# Patient Record
Sex: FEMALE | Race: WHITE | Employment: UNEMPLOYED | URBAN - METROPOLITAN AREA
[De-identification: names, ages, dates, MRNs, and addresses within clinical notes are randomized per-mention and may not be internally consistent; named-entity substitution may affect disease eponyms.]

---

## 2018-01-17 ENCOUNTER — OFFICE VISIT (OUTPATIENT)
Dept: OBGYN CLINIC | Facility: CLINIC | Age: 28
End: 2018-01-17

## 2018-01-17 VITALS — WEIGHT: 228 LBS | SYSTOLIC BLOOD PRESSURE: 131 MMHG | DIASTOLIC BLOOD PRESSURE: 86 MMHG | HEART RATE: 81 BPM

## 2018-01-17 DIAGNOSIS — Z12.4 SCREENING FOR MALIGNANT NEOPLASM OF CERVIX: ICD-10-CM

## 2018-01-17 DIAGNOSIS — Z01.419 ENCOUNTER FOR GYNECOLOGICAL EXAMINATION WITHOUT ABNORMAL FINDING: Primary | ICD-10-CM

## 2018-01-17 PROCEDURE — 99385 PREV VISIT NEW AGE 18-39: CPT | Performed by: OBSTETRICS & GYNECOLOGY

## 2018-01-17 RX ORDER — NORETHINDRONE ACETATE AND ETHINYL ESTRADIOL AND FERROUS FUMARATE 1MG-20(21)
KIT ORAL
Qty: 1 PACKAGE | Refills: 12 | Status: SHIPPED | OUTPATIENT
Start: 2018-01-17 | End: 2018-09-12

## 2018-01-17 RX ORDER — NORETHINDRONE ACETATE AND ETHINYL ESTRADIOL AND FERROUS FUMARATE 1MG-20(21)
KIT ORAL
Refills: 6 | COMMUNITY
Start: 2017-12-31 | End: 2018-01-17

## 2018-01-18 NOTE — PROGRESS NOTES
HPI:    Patient ID: Giles Clemente is a 32year old female. Referred by Dr Ferdinand Alegria family members. Dr Ferdinand Alegria is her father-in-law. HPI   with reg menses on OCP for BC. She is 6 year relationship.  She delivered [place] at [facility] 6 months ago with There is no rebound and no guarding. Genitourinary: Vagina normal and uterus normal. No breast discharge. There is no rash or lesion on the right labia. There is no rash or lesion on the left labia. Uterus is not enlarged and not tender.  Cervix exhibits

## 2018-02-22 ENCOUNTER — TELEPHONE (OUTPATIENT)
Dept: OBGYN CLINIC | Facility: CLINIC | Age: 28
End: 2018-02-22

## 2018-02-23 ENCOUNTER — OFFICE VISIT (OUTPATIENT)
Dept: OBGYN CLINIC | Facility: CLINIC | Age: 28
End: 2018-02-23

## 2018-02-23 VITALS — DIASTOLIC BLOOD PRESSURE: 83 MMHG | HEART RATE: 80 BPM | WEIGHT: 230 LBS | SYSTOLIC BLOOD PRESSURE: 135 MMHG

## 2018-02-23 DIAGNOSIS — N64.4 MASTODYNIA: Primary | ICD-10-CM

## 2018-02-23 PROCEDURE — 99213 OFFICE O/P EST LOW 20 MIN: CPT | Performed by: OBSTETRICS & GYNECOLOGY

## 2018-04-16 NOTE — PROGRESS NOTES
HPI:    Patient ID: Adam Burris is a 32year old female. HPI   with reg menses on OCP for BC. Here today with c/o lump on right breast first noted in past week and some diffuse tenderness. No Nipple change or DC.  No drainage from area on superf

## 2018-05-07 ENCOUNTER — PATIENT MESSAGE (OUTPATIENT)
Dept: OBGYN CLINIC | Facility: CLINIC | Age: 28
End: 2018-05-07

## 2018-05-07 DIAGNOSIS — N63.10 LUMP OF RIGHT BREAST: Primary | ICD-10-CM

## 2018-05-07 NOTE — TELEPHONE ENCOUNTER
Exam showed probable inclusion cyst on right. OK for US right  And mammo to follow if necessary. Order was sent.

## 2018-05-07 NOTE — TELEPHONE ENCOUNTER
From: Matthew Davila  To: Grace Hartman DO  Sent: 5/7/2018 12:24 PM CDT  Subject: Visit Radames Staley Patient told me to let you know if the bump in my boob has gone away after my second cycle.  I’m just beginning my third cycle fro

## 2018-05-18 ENCOUNTER — HOSPITAL ENCOUNTER (OUTPATIENT)
Dept: ULTRASOUND IMAGING | Facility: HOSPITAL | Age: 28
Discharge: HOME | End: 2018-05-18
Attending: OBSTETRICS & GYNECOLOGY
Payer: COMMERCIAL

## 2018-05-18 DIAGNOSIS — N63.10 LUMP OF RIGHT BREAST: ICD-10-CM

## 2018-05-18 PROCEDURE — 76642 ULTRASOUND BREAST LIMITED: CPT | Performed by: OBSTETRICS & GYNECOLOGY

## 2018-06-20 ENCOUNTER — OFFICE VISIT (OUTPATIENT)
Dept: INTERNAL MEDICINE CLINIC | Facility: CLINIC | Age: 28
End: 2018-06-20

## 2018-06-20 VITALS
SYSTOLIC BLOOD PRESSURE: 133 MMHG | DIASTOLIC BLOOD PRESSURE: 82 MMHG | BODY MASS INDEX: 42.11 KG/M2 | HEIGHT: 63 IN | TEMPERATURE: 99 F | HEART RATE: 87 BPM | WEIGHT: 237.69 LBS

## 2018-06-20 DIAGNOSIS — Z00.00 ANNUAL PHYSICAL EXAM: Primary | ICD-10-CM

## 2018-06-20 PROCEDURE — 99385 PREV VISIT NEW AGE 18-39: CPT | Performed by: INTERNAL MEDICINE

## 2018-06-20 NOTE — PROGRESS NOTES
Heriberto Hernandez is a 32year old female. Patient presents with:  Physical      HPI:     HPI   Patient is a 31 yo f here for annual physical exam. Her previous is Dr. Nati Rodriguez in [facility].  She is overall doing well. She is 11 months postpartum.   Claudine Guadalupe Negative for back pain and myalgias. Skin: Negative for rash. Neurological: Negative for dizziness and headaches. Endo/Heme/Allergies: Negative for environmental allergies. Does not bruise/bleed easily.    Psychiatric/Behavioral: Negative for depressi Psychiatric: She has a normal mood and affect.          ASSESSMENT AND PLAN:       Diagnoses and all orders for this visit:    Annual physical exam  Discussed about possible etiology for abdominal pain including irritable bowel disease, dyspepsia or infla

## 2018-06-29 ENCOUNTER — LAB ENCOUNTER (OUTPATIENT)
Dept: LAB | Facility: HOSPITAL | Age: 28
End: 2018-06-29
Attending: INTERNAL MEDICINE
Payer: COMMERCIAL

## 2018-06-29 DIAGNOSIS — Z00.00 ANNUAL PHYSICAL EXAM: ICD-10-CM

## 2018-06-29 LAB
ALBUMIN SERPL BCP-MCNC: 4.1 G/DL (ref 3.5–4.8)
ALBUMIN/GLOB SERPL: 1.3 {RATIO} (ref 1–2)
ALP SERPL-CCNC: 50 U/L (ref 32–100)
ALT SERPL-CCNC: 22 U/L (ref 14–54)
ANION GAP SERPL CALC-SCNC: 10 MMOL/L (ref 0–18)
AST SERPL-CCNC: 22 U/L (ref 15–41)
BASOPHILS # BLD: 0 K/UL (ref 0–0.2)
BASOPHILS NFR BLD: 1 %
BILIRUB SERPL-MCNC: 0.4 MG/DL (ref 0.3–1.2)
BUN SERPL-MCNC: 11 MG/DL (ref 8–20)
BUN/CREAT SERPL: 14.9 (ref 10–20)
CALCIUM SERPL-MCNC: 9.7 MG/DL (ref 8.5–10.5)
CHLORIDE SERPL-SCNC: 103 MMOL/L (ref 95–110)
CHOLEST SERPL-MCNC: 197 MG/DL (ref 110–200)
CO2 SERPL-SCNC: 23 MMOL/L (ref 22–32)
CREAT SERPL-MCNC: 0.74 MG/DL (ref 0.5–1.5)
EOSINOPHIL # BLD: 0.1 K/UL (ref 0–0.7)
EOSINOPHIL NFR BLD: 1 %
ERYTHROCYTE [DISTWIDTH] IN BLOOD BY AUTOMATED COUNT: 14.2 % (ref 11–15)
GLOBULIN PLAS-MCNC: 3.1 G/DL (ref 2.5–3.7)
GLUCOSE SERPL-MCNC: 110 MG/DL (ref 70–99)
HBA1C MFR BLD: 6 % (ref 4–6)
HCT VFR BLD AUTO: 40.3 % (ref 35–48)
HDLC SERPL-MCNC: 49 MG/DL
HGB BLD-MCNC: 13.5 G/DL (ref 12–16)
LDLC SERPL CALC-MCNC: 121 MG/DL (ref 0–99)
LYMPHOCYTES # BLD: 1.8 K/UL (ref 1–4)
LYMPHOCYTES NFR BLD: 36 %
MCH RBC QN AUTO: 28.8 PG (ref 27–32)
MCHC RBC AUTO-ENTMCNC: 33.5 G/DL (ref 32–37)
MCV RBC AUTO: 85.9 FL (ref 80–100)
MONOCYTES # BLD: 0.5 K/UL (ref 0–1)
MONOCYTES NFR BLD: 9 %
NEUTROPHILS # BLD AUTO: 2.7 K/UL (ref 1.8–7.7)
NEUTROPHILS NFR BLD: 53 %
NONHDLC SERPL-MCNC: 148 MG/DL
OSMOLALITY UR CALC.SUM OF ELEC: 282 MOSM/KG (ref 275–295)
PATIENT FASTING: YES
PLATELET # BLD AUTO: 190 K/UL (ref 140–400)
PMV BLD AUTO: 9.5 FL (ref 7.4–10.3)
POTASSIUM SERPL-SCNC: 4.6 MMOL/L (ref 3.3–5.1)
PROT SERPL-MCNC: 7.2 G/DL (ref 5.9–8.4)
RBC # BLD AUTO: 4.69 M/UL (ref 3.7–5.4)
SODIUM SERPL-SCNC: 136 MMOL/L (ref 136–144)
TRIGL SERPL-MCNC: 134 MG/DL (ref 1–149)
TSH SERPL-ACNC: 1.16 UIU/ML (ref 0.45–5.33)
WBC # BLD AUTO: 5 K/UL (ref 4–11)

## 2018-06-29 PROCEDURE — 80053 COMPREHEN METABOLIC PANEL: CPT

## 2018-06-29 PROCEDURE — 85025 COMPLETE CBC W/AUTO DIFF WBC: CPT

## 2018-06-29 PROCEDURE — 84443 ASSAY THYROID STIM HORMONE: CPT

## 2018-06-29 PROCEDURE — 83036 HEMOGLOBIN GLYCOSYLATED A1C: CPT

## 2018-06-29 PROCEDURE — 36415 COLL VENOUS BLD VENIPUNCTURE: CPT

## 2018-06-29 PROCEDURE — 80061 LIPID PANEL: CPT

## 2018-09-11 ENCOUNTER — PATIENT MESSAGE (OUTPATIENT)
Dept: OBGYN CLINIC | Facility: CLINIC | Age: 28
End: 2018-09-11

## 2019-02-13 ENCOUNTER — TELEPHONE (OUTPATIENT)
Dept: OBGYN CLINIC | Facility: CLINIC | Age: 29
End: 2019-02-13

## 2019-02-13 NOTE — TELEPHONE ENCOUNTER
Received a requst for Low Ogestrel birth control pills. Pt had her last Annual with ALFONZO 1-17-18. Pap 1-2018, ALFONZO stated on 1-19-18 neg pap and pap in 3 years. Informed pt that she needs to schedule an Annual, so rx for ocps can be sent.   Pt transf

## 2019-04-11 ENCOUNTER — APPOINTMENT (OUTPATIENT)
Dept: LAB | Age: 29
End: 2019-04-11
Attending: OBSTETRICS & GYNECOLOGY
Payer: COMMERCIAL

## 2019-04-11 ENCOUNTER — OFFICE VISIT (OUTPATIENT)
Dept: OBGYN CLINIC | Facility: CLINIC | Age: 29
End: 2019-04-11

## 2019-04-11 VITALS
HEART RATE: 78 BPM | SYSTOLIC BLOOD PRESSURE: 128 MMHG | WEIGHT: 233 LBS | BODY MASS INDEX: 41 KG/M2 | DIASTOLIC BLOOD PRESSURE: 79 MMHG

## 2019-04-11 DIAGNOSIS — Z01.419 ENCOUNTER FOR GYNECOLOGICAL EXAMINATION WITHOUT ABNORMAL FINDING: Primary | ICD-10-CM

## 2019-04-11 DIAGNOSIS — Z31.69 ENCOUNTER FOR PRECONCEPTION CONSULTATION: ICD-10-CM

## 2019-04-11 PROCEDURE — 99395 PREV VISIT EST AGE 18-39: CPT | Performed by: OBSTETRICS & GYNECOLOGY

## 2019-04-11 PROCEDURE — 86762 RUBELLA ANTIBODY: CPT

## 2019-04-11 PROCEDURE — 36415 COLL VENOUS BLD VENIPUNCTURE: CPT

## 2019-04-11 NOTE — PROGRESS NOTES
HPI:    Patient ID: April Gauthier is a 25year old female. HPI   with regular menses on OCP for BC. They are planning on stopping OC by about  or July and then trying for conception. SHe had screen labs in  with normal CBC and TSH.      Re discharge. There is no rash or lesion on the right labia. There is no rash or lesion on the left labia. Uterus is not enlarged and not tender. Cervix exhibits no motion tenderness and no discharge.  Right adnexum displays no mass, no tenderness and no fulln

## 2019-05-06 ENCOUNTER — TELEPHONE (OUTPATIENT)
Dept: OBGYN CLINIC | Facility: CLINIC | Age: 29
End: 2019-05-06

## 2019-05-06 NOTE — TELEPHONE ENCOUNTER
Received refill request for OCP from [facility]. Per ALFONZO notes 4/11/19 Tuan Massey are planning on stopping OC by about June or July and then trying for conception\". Spoke with pt and pt requesting 1 month OCP refill.  Informed pt 1 month OCP will be sent to [facility]

## 2020-02-27 ENCOUNTER — PATIENT MESSAGE (OUTPATIENT)
Dept: OBGYN CLINIC | Facility: CLINIC | Age: 30
End: 2020-02-27

## 2020-02-27 NOTE — TELEPHONE ENCOUNTER
From: Arsalan [address]  To: Bulmaro Dunham. DO Víctor  Sent: 2/27/2020 1:20 PM CST  Subject: Other    Hello,    Do you have any appointments at [facility] in mid April?

## 2020-06-15 ENCOUNTER — OFFICE VISIT (OUTPATIENT)
Dept: OBGYN CLINIC | Facility: CLINIC | Age: 30
End: 2020-06-15

## 2020-06-15 VITALS
DIASTOLIC BLOOD PRESSURE: 86 MMHG | WEIGHT: 244 LBS | HEART RATE: 93 BPM | SYSTOLIC BLOOD PRESSURE: 143 MMHG | BODY MASS INDEX: 43 KG/M2

## 2020-06-15 DIAGNOSIS — Z11.3 SCREENING EXAMINATION FOR VENEREAL DISEASE: ICD-10-CM

## 2020-06-15 DIAGNOSIS — Z01.419 ENCOUNTER FOR GYNECOLOGICAL EXAMINATION WITHOUT ABNORMAL FINDING: Primary | ICD-10-CM

## 2020-06-15 DIAGNOSIS — N97.9 SECONDARY FEMALE INFERTILITY: ICD-10-CM

## 2020-06-15 DIAGNOSIS — Z12.4 SCREENING FOR MALIGNANT NEOPLASM OF CERVIX: ICD-10-CM

## 2020-06-15 PROCEDURE — 99212 OFFICE O/P EST SF 10 MIN: CPT | Performed by: OBSTETRICS & GYNECOLOGY

## 2020-06-15 PROCEDURE — 99395 PREV VISIT EST AGE 18-39: CPT | Performed by: OBSTETRICS & GYNECOLOGY

## 2020-06-22 NOTE — PROGRESS NOTES
HPI:    Patient ID: Leatha Roberts is a 30year old female. HPI   with menses q 25-36 d / 4d / camps and heavy flow x 3d. She stopped OCP in May 2019. Regular IC with her  since and no conception. Reyna Dallas will be 1 next month.   She conceive adnexum displays no mass, no tenderness and no fullness. No vaginal discharge. Genitourinary Comments: Bilateral breasts without skin / nipple changes, mass, tenderness or axillary adenopathy.        Musculoskeletal:         General: No tenderness o

## 2020-07-25 ENCOUNTER — APPOINTMENT (OUTPATIENT)
Dept: LAB | Facility: HOSPITAL | Age: 30
End: 2020-07-25
Attending: OBSTETRICS & GYNECOLOGY
Payer: COMMERCIAL

## 2020-07-25 DIAGNOSIS — N97.9 SECONDARY FEMALE INFERTILITY: ICD-10-CM

## 2020-07-25 LAB — PROGEST SERPL-MCNC: 22 NG/ML

## 2020-07-25 PROCEDURE — 36415 COLL VENOUS BLD VENIPUNCTURE: CPT

## 2020-07-25 PROCEDURE — 84144 ASSAY OF PROGESTERONE: CPT

## 2020-08-26 ENCOUNTER — LAB ENCOUNTER (OUTPATIENT)
Dept: LAB | Facility: HOSPITAL | Age: 30
End: 2020-08-26
Attending: OBSTETRICS & GYNECOLOGY
Payer: COMMERCIAL

## 2020-08-26 DIAGNOSIS — N97.9 SECONDARY FEMALE INFERTILITY: ICD-10-CM

## 2020-08-26 LAB — PROGEST SERPL-MCNC: 21.4 NG/ML

## 2020-08-26 PROCEDURE — 84144 ASSAY OF PROGESTERONE: CPT

## 2020-08-26 PROCEDURE — 36415 COLL VENOUS BLD VENIPUNCTURE: CPT

## 2020-09-06 ENCOUNTER — PATIENT MESSAGE (OUTPATIENT)
Dept: OBGYN CLINIC | Facility: CLINIC | Age: 30
End: 2020-09-06

## 2020-09-06 DIAGNOSIS — N97.9 FEMALE INFERTILITY: Primary | ICD-10-CM

## 2020-09-08 NOTE — TELEPHONE ENCOUNTER
From: Rosiland Carrel  To: Iglesia Newman. Romeo Avalos DO  Sent: 9/6/2020 11:08 AM CDT  Subject: Non-Urgent Medical Question    HI Dr Romeo Avalos, I just got my period again and i will be taking my third dose of CLOMIDE this month.      Is it beneficial at this point for my

## 2020-09-26 ENCOUNTER — LAB ENCOUNTER (OUTPATIENT)
Dept: LAB | Facility: HOSPITAL | Age: 30
End: 2020-09-26
Attending: OBSTETRICS & GYNECOLOGY
Payer: COMMERCIAL

## 2020-09-26 DIAGNOSIS — N97.9 SECONDARY FEMALE INFERTILITY: ICD-10-CM

## 2020-09-26 LAB — PROGEST SERPL-MCNC: 29.5 NG/ML

## 2020-09-26 PROCEDURE — 84144 ASSAY OF PROGESTERONE: CPT

## 2020-09-26 PROCEDURE — 36415 COLL VENOUS BLD VENIPUNCTURE: CPT

## 2020-10-14 NOTE — TELEPHONE ENCOUNTER
We should begin fertility work up with day 3 labs ( please make sure list is complete ), HSG and then referral to [facility]. Spouse will usually need SA if HMO.

## 2020-10-15 NOTE — TELEPHONE ENCOUNTER
Unable to coordinate HSG in this cycle , [facility] sent advising to call on day 1 of first full flow of next cycle to schedule her for procedure .

## 2020-10-27 ENCOUNTER — OFFICE VISIT (OUTPATIENT)
Dept: INTERNAL MEDICINE CLINIC | Facility: CLINIC | Age: 30
End: 2020-10-27

## 2020-10-27 ENCOUNTER — MED REC SCAN ONLY (OUTPATIENT)
Dept: INTERNAL MEDICINE CLINIC | Facility: CLINIC | Age: 30
End: 2020-10-27

## 2020-10-27 ENCOUNTER — LAB ENCOUNTER (OUTPATIENT)
Dept: LAB | Facility: HOSPITAL | Age: 30
End: 2020-10-27
Attending: INTERNAL MEDICINE
Payer: COMMERCIAL

## 2020-10-27 VITALS
TEMPERATURE: 98 F | OXYGEN SATURATION: 98 % | SYSTOLIC BLOOD PRESSURE: 136 MMHG | BODY MASS INDEX: 43.23 KG/M2 | WEIGHT: 244 LBS | HEART RATE: 84 BPM | DIASTOLIC BLOOD PRESSURE: 85 MMHG | HEIGHT: 63 IN

## 2020-10-27 DIAGNOSIS — Z23 NEED FOR INFLUENZA VACCINATION: ICD-10-CM

## 2020-10-27 DIAGNOSIS — Z00.00 ADULT GENERAL MEDICAL EXAM: Primary | ICD-10-CM

## 2020-10-27 DIAGNOSIS — Z00.00 ADULT GENERAL MEDICAL EXAM: ICD-10-CM

## 2020-10-27 PROCEDURE — 82306 VITAMIN D 25 HYDROXY: CPT

## 2020-10-27 PROCEDURE — 90471 IMMUNIZATION ADMIN: CPT | Performed by: INTERNAL MEDICINE

## 2020-10-27 PROCEDURE — 85027 COMPLETE CBC AUTOMATED: CPT

## 2020-10-27 PROCEDURE — 99395 PREV VISIT EST AGE 18-39: CPT | Performed by: INTERNAL MEDICINE

## 2020-10-27 PROCEDURE — 80053 COMPREHEN METABOLIC PANEL: CPT

## 2020-10-27 PROCEDURE — 3079F DIAST BP 80-89 MM HG: CPT | Performed by: INTERNAL MEDICINE

## 2020-10-27 PROCEDURE — 90686 IIV4 VACC NO PRSV 0.5 ML IM: CPT | Performed by: INTERNAL MEDICINE

## 2020-10-27 PROCEDURE — 36415 COLL VENOUS BLD VENIPUNCTURE: CPT

## 2020-10-27 PROCEDURE — 84443 ASSAY THYROID STIM HORMONE: CPT

## 2020-10-27 PROCEDURE — 3008F BODY MASS INDEX DOCD: CPT | Performed by: INTERNAL MEDICINE

## 2020-10-27 PROCEDURE — 3075F SYST BP GE 130 - 139MM HG: CPT | Performed by: INTERNAL MEDICINE

## 2020-10-27 PROCEDURE — 82607 VITAMIN B-12: CPT

## 2020-10-27 PROCEDURE — 80061 LIPID PANEL: CPT

## 2020-10-27 PROCEDURE — 83036 HEMOGLOBIN GLYCOSYLATED A1C: CPT

## 2020-10-27 NOTE — PROGRESS NOTES
Anival Olson is a 30year old female. Patient presents with:  Physical: flu vaccine    HPI:   17-year-old pleasant lady with no significant medical history here to establish care and for physical.  She has no complaints.     Past medical history none    Past kg)  04/11/19 : 233 lb (105.7 kg)  06/20/18 : 237 lb 11.2 oz (107.8 kg)  02/23/18 : 230 lb (104.3 kg)    Body mass index is 43.22 kg/m².       EXAM:   /85 (BP Location: Left arm, Patient Position: Sitting, Cuff Size: large)   Pulse 84   Temp 98.2 °F ( denies any abuse or depression. 2. Need for influenza vaccination  Flu vaccine ordered    Plan: As above. If everything is good, I will see her back in 1 year      The patient indicates understanding of these issues and agrees to the plan.   No follo

## 2020-10-29 ENCOUNTER — TELEPHONE (OUTPATIENT)
Dept: INTERNAL MEDICINE CLINIC | Facility: CLINIC | Age: 30
End: 2020-10-29

## 2020-10-30 ENCOUNTER — NURSE TRIAGE (OUTPATIENT)
Dept: INTERNAL MEDICINE CLINIC | Facility: CLINIC | Age: 30
End: 2020-10-30

## 2020-10-30 NOTE — TELEPHONE ENCOUNTER
Action Requested: Summary for Provider     []  Critical Lab, Recommendations Needed  [] Need Additional Advice  []   FYI    []   Need Orders  [] Need Medications Sent to Pharmacy  []  Other     SUMMARY:  Flu shot done 10- to right arm.   Arm swelled

## 2020-10-30 NOTE — TELEPHONE ENCOUNTER
Please contact pt to triage.   See my chart message below    Pt has sent image with my chart message

## 2020-10-30 NOTE — TELEPHONE ENCOUNTER
----- Message from Arsalan Hartmann sent at 10/30/2020  9:04 AM CDT -----  Regarding: RE: Visit Follow-up Question  Contact: [phone]  Hello,     This is the first time I have had the flu shot.  I was wondering if this is normal? I haven't tried applying an

## 2020-11-03 ENCOUNTER — PATIENT MESSAGE (OUTPATIENT)
Dept: INTERNAL MEDICINE CLINIC | Facility: CLINIC | Age: 30
End: 2020-11-03

## 2020-11-03 ENCOUNTER — OFFICE VISIT (OUTPATIENT)
Dept: INTERNAL MEDICINE CLINIC | Facility: CLINIC | Age: 30
End: 2020-11-03

## 2020-11-03 VITALS
SYSTOLIC BLOOD PRESSURE: 125 MMHG | TEMPERATURE: 98 F | DIASTOLIC BLOOD PRESSURE: 79 MMHG | WEIGHT: 238 LBS | OXYGEN SATURATION: 98 % | BODY MASS INDEX: 42 KG/M2 | HEART RATE: 91 BPM

## 2020-11-03 DIAGNOSIS — E55.9 VITAMIN D DEFICIENCY: ICD-10-CM

## 2020-11-03 DIAGNOSIS — E11.9 TYPE 2 DIABETES MELLITUS WITHOUT COMPLICATION, WITHOUT LONG-TERM CURRENT USE OF INSULIN (HCC): Primary | ICD-10-CM

## 2020-11-03 DIAGNOSIS — E78.1 HYPERTRIGLYCERIDEMIA: ICD-10-CM

## 2020-11-03 PROCEDURE — 99214 OFFICE O/P EST MOD 30 MIN: CPT | Performed by: INTERNAL MEDICINE

## 2020-11-03 PROCEDURE — 3074F SYST BP LT 130 MM HG: CPT | Performed by: INTERNAL MEDICINE

## 2020-11-03 PROCEDURE — 3078F DIAST BP <80 MM HG: CPT | Performed by: INTERNAL MEDICINE

## 2020-11-03 RX ORDER — BLOOD SUGAR DIAGNOSTIC
STRIP MISCELLANEOUS
Qty: 90 STRIP | Refills: 1 | Status: SHIPPED | OUTPATIENT
Start: 2020-11-03

## 2020-11-03 RX ORDER — BLOOD-GLUCOSE METER
1 EACH MISCELLANEOUS DAILY
Qty: 1 KIT | Refills: 0 | Status: SHIPPED | OUTPATIENT
Start: 2020-11-03

## 2020-11-03 RX ORDER — LANCETS
EACH MISCELLANEOUS
Qty: 90 EACH | Refills: 1 | Status: SHIPPED | OUTPATIENT
Start: 2020-11-03

## 2020-11-03 NOTE — PROGRESS NOTES
Arlina Felty is a 30year old female. Patient presents with:  Test Results: discuss    HPI:   68-year-old lady here for evaluation of abnormal labs.   During routine blood test, she was found to have new onset of diabetes, hypertriglyceridemia and vitamin D time. She appears well-nourished. HENT:   Head: Normocephalic. Neck: Neck supple. Cardiovascular: Normal rate, regular rhythm and normal heart sounds. No murmur heard. Edema not present.   Pulmonary/Chest: Effort normal and breath sounds normal.

## 2020-11-03 NOTE — TELEPHONE ENCOUNTER
From: Guillermo [place]  To: Hardik Mitchell MD  Sent: 11/3/2020 12:31 PM CST  Subject: Visit Follow-up Question    Hello,    I know you sent through the lancets, testing strips and supplies but where do I get them at?

## 2021-01-25 ENCOUNTER — LAB ENCOUNTER (OUTPATIENT)
Dept: LAB | Facility: HOSPITAL | Age: 31
End: 2021-01-25
Attending: INTERNAL MEDICINE
Payer: COMMERCIAL

## 2021-01-25 DIAGNOSIS — E11.9 TYPE 2 DIABETES MELLITUS WITHOUT COMPLICATION, WITHOUT LONG-TERM CURRENT USE OF INSULIN (HCC): ICD-10-CM

## 2021-01-25 LAB
CREAT UR-SCNC: 78.5 MG/DL
EST. AVERAGE GLUCOSE BLD GHB EST-MCNC: 146 MG/DL (ref 68–126)
HBA1C MFR BLD HPLC: 6.7 % (ref ?–5.7)
MICROALBUMIN UR-MCNC: 4.19 MG/DL
MICROALBUMIN/CREAT 24H UR-RTO: 53.4 UG/MG (ref ?–30)

## 2021-01-25 PROCEDURE — 82570 ASSAY OF URINE CREATININE: CPT

## 2021-01-25 PROCEDURE — 36415 COLL VENOUS BLD VENIPUNCTURE: CPT

## 2021-01-25 PROCEDURE — 83036 HEMOGLOBIN GLYCOSYLATED A1C: CPT

## 2021-01-25 PROCEDURE — 82043 UR ALBUMIN QUANTITATIVE: CPT

## 2021-01-28 ENCOUNTER — OFFICE VISIT (OUTPATIENT)
Dept: OPHTHALMOLOGY | Facility: CLINIC | Age: 31
End: 2021-01-28

## 2021-01-28 DIAGNOSIS — E11.9 DIET-CONTROLLED DIABETES MELLITUS (HCC): Primary | ICD-10-CM

## 2021-01-28 DIAGNOSIS — Z98.890 HX OF LASIK: ICD-10-CM

## 2021-01-28 PROCEDURE — 99243 OFF/OP CNSLTJ NEW/EST LOW 30: CPT | Performed by: OPHTHALMOLOGY

## 2021-01-28 NOTE — PROGRESS NOTES
Sumeet Guerra is a 26year old female.     HPI:     HPI     Diabetic Eye Exam      Additional comments: Pt has been a diabetic for 3 months  Pt's diabetes is currently controlled through diet control  Pt checks BS once a day  Pt's last blood sugar was 119 ye Musculoskeletal, HENT, Cardiovascular, Respiratory, Psychiatric, Allergic/Imm, Heme/Lymph    Last edited by Javier Frey OT on 1/28/2021  3:14 PM. (History)          PHYSICAL EXAM:     Base Eye Exam     Visual Acuity (Snellen - Linear)       Right Lef encounter        Follow up instructions:  Return for 2-3 years for complete exam or will see yearly if patient is put on diabetic medications .     1/28/2021  Scribed by: Amandeep Enriquez MD

## 2021-01-28 NOTE — PATIENT INSTRUCTIONS
Diet-controlled diabetes mellitus ([facility] [address]  Diet controlled diabetes: no background of retinopathy, no signs of neovascularization noted. Discussed ocular and systemic benefits of blood sugar control.   Diagnosis and treatment discussed in detail with patient

## 2021-02-02 ENCOUNTER — OFFICE VISIT (OUTPATIENT)
Dept: INTERNAL MEDICINE CLINIC | Facility: CLINIC | Age: 31
End: 2021-02-02

## 2021-02-02 VITALS
DIASTOLIC BLOOD PRESSURE: 70 MMHG | RESPIRATION RATE: 14 BRPM | HEIGHT: 63 IN | WEIGHT: 233 LBS | BODY MASS INDEX: 41.29 KG/M2 | HEART RATE: 80 BPM | SYSTOLIC BLOOD PRESSURE: 120 MMHG | OXYGEN SATURATION: 98 %

## 2021-02-02 DIAGNOSIS — E11.9 DIET-CONTROLLED DIABETES MELLITUS (HCC): Primary | ICD-10-CM

## 2021-02-02 PROCEDURE — 3074F SYST BP LT 130 MM HG: CPT | Performed by: INTERNAL MEDICINE

## 2021-02-02 PROCEDURE — 3008F BODY MASS INDEX DOCD: CPT | Performed by: INTERNAL MEDICINE

## 2021-02-02 PROCEDURE — 99213 OFFICE O/P EST LOW 20 MIN: CPT | Performed by: INTERNAL MEDICINE

## 2021-02-02 PROCEDURE — 3078F DIAST BP <80 MM HG: CPT | Performed by: INTERNAL MEDICINE

## 2021-02-03 NOTE — PROGRESS NOTES
Vanessa Oates is a 26year old female. Patient presents with: Follow - Up: Lab results discussion     HPI:   68-year-old female here for follow-up of diabetes. Overall she reports that she is doing well. She denies any complaints.   She is keeping an eye o (105.7 kg)    Body mass index is 41.27 kg/m².       EXAM:   /70 (BP Location: Left arm, Patient Position: Sitting, Cuff Size: adult)   Pulse 80   Resp 14   Ht 5' 3\" (1.6 m)   Wt 233 lb (105.7 kg)   LMP 01/06/2021 (Exact Date)   SpO2 98%   BMI 41.27 k

## 2021-03-10 ENCOUNTER — TELEPHONE (OUTPATIENT)
Dept: INTERNAL MEDICINE CLINIC | Facility: CLINIC | Age: 31
End: 2021-03-10

## 2021-03-10 ENCOUNTER — PATIENT MESSAGE (OUTPATIENT)
Dept: INTERNAL MEDICINE CLINIC | Facility: CLINIC | Age: 31
End: 2021-03-10

## 2021-03-10 RX ORDER — CYCLOBENZAPRINE HCL 10 MG
10 TABLET ORAL NIGHTLY
Qty: 7 TABLET | Refills: 0 | Status: SHIPPED | OUTPATIENT
Start: 2021-03-10 | End: 2021-03-17

## 2021-03-10 NOTE — TELEPHONE ENCOUNTER
From: [facility]  To: Bekah Bain MD  Sent: 3/10/2021 11:27 AM CST  Subject: Other    Hi Dr. Gabriel Bynum,    My neck and shoulder have been very stiff and painful on my right side. I think I slept wrong. Can you prescribe something for it?  I've been usin

## 2021-03-10 NOTE — TELEPHONE ENCOUNTER
Left message to call back to further discuss symptoms below--likely needs in office appt for eval    Do you need her to schedule in office appt prior to Rx to pharmacy?     Please reply to belen: FÁTIMA Venegas

## 2021-03-10 NOTE — TELEPHONE ENCOUNTER
I have sent a muscle relaxant called Flexeril to the pharmacy. Please let her take it once at night. She can also take Motrin 400 mg 2 times a day with food. Please instruct patient not to drive after taking muscle relaxant.   She can also apply topical

## 2021-03-10 NOTE — TELEPHONE ENCOUNTER
----- Message from Lyndon Coleman sent at 3/10/2021 11:27 AM CST -----  Regarding: Other  Contact: [phone]  Hi Dr. Missy Avilez,    My neck and shoulder have been very stiff and painful on my right side. I think I slept wrong.  Can you prescribe something for

## 2021-03-12 NOTE — TELEPHONE ENCOUNTER
Spoke with patient (name and  verified), informed of  message. She has already picked up medication.

## 2021-03-29 ENCOUNTER — PATIENT MESSAGE (OUTPATIENT)
Dept: OBGYN CLINIC | Facility: CLINIC | Age: 31
End: 2021-03-29

## 2021-03-29 DIAGNOSIS — Z31.41 FERTILITY TESTING: Primary | ICD-10-CM

## 2021-03-29 NOTE — TELEPHONE ENCOUNTER
Message to ALFONZO if OK for pt to go fowrard with fertility work up? pts hemoglobin A1c in January was 6.7.

## 2021-03-29 NOTE — TELEPHONE ENCOUNTER
From: Austin Nelson  To: Sam Valenzuela. Aman Wheeler DO  Sent: 3/29/2021 11:48 AM CDT  Subject: Non-Urgent Ivanna Hasten Dr. Aman Wheeler,    My  and I want to further my testing we discussed about getting pregnant.  This is what you and I discussed in [facility]

## 2021-03-30 ENCOUNTER — IMMUNIZATION (OUTPATIENT)
Dept: LAB | Facility: HOSPITAL | Age: 31
End: 2021-03-30
Attending: HOSPITALIST
Payer: COMMERCIAL

## 2021-03-30 DIAGNOSIS — Z23 NEED FOR VACCINATION: Primary | ICD-10-CM

## 2021-03-30 PROCEDURE — 0011A SARSCOV2 VAC 100MCG/0.5ML IM: CPT

## 2021-04-05 NOTE — TELEPHONE ENCOUNTER
So ok for day 3 labs and add AMH as well. OK to schedule HSG day 7-11 with this cycle. I see SA report on message in chart.

## 2021-04-05 NOTE — TELEPHONE ENCOUNTER
Pt stated today was her day one full flow. Informed pt to have her labs done on day 4/7 for her day 3's. Sent to Referral nurse to contact the pt for her HSG to be scheduled. ALFONZO wants pt to see EDMON[facility], once all her workup is completed.   Sent to Referral

## 2021-04-06 NOTE — TELEPHONE ENCOUNTER
Please advise if OK to schedule HSG before office on Wednesday,04/14/2021.  If so please specify what time is preferable

## 2021-04-07 ENCOUNTER — LAB ENCOUNTER (OUTPATIENT)
Dept: LAB | Facility: HOSPITAL | Age: 31
End: 2021-04-07
Attending: OBSTETRICS & GYNECOLOGY
Payer: COMMERCIAL

## 2021-04-07 DIAGNOSIS — Z31.41 FERTILITY TESTING: ICD-10-CM

## 2021-04-07 PROCEDURE — 83001 ASSAY OF GONADOTROPIN (FSH): CPT

## 2021-04-07 PROCEDURE — 84146 ASSAY OF PROLACTIN: CPT

## 2021-04-07 PROCEDURE — 84443 ASSAY THYROID STIM HORMONE: CPT

## 2021-04-07 PROCEDURE — 83520 IMMUNOASSAY QUANT NOS NONAB: CPT

## 2021-04-07 PROCEDURE — 36415 COLL VENOUS BLD VENIPUNCTURE: CPT

## 2021-04-07 PROCEDURE — 82670 ASSAY OF TOTAL ESTRADIOL: CPT

## 2021-04-07 NOTE — TELEPHONE ENCOUNTER
Message to referral coordinator and to ALFONZO MEJÍA- please see Elfredia Day message below regarding scheduling

## 2021-04-08 ENCOUNTER — PATIENT MESSAGE (OUTPATIENT)
Dept: OBGYN CLINIC | Facility: CLINIC | Age: 31
End: 2021-04-08

## 2021-04-08 NOTE — TELEPHONE ENCOUNTER
Spoke to pt. Aware are awaiting ALFONZO response.  Knows might not be possible to schedule on this menses due to MD availability

## 2021-04-08 NOTE — TELEPHONE ENCOUNTER
From: Arsalan [place]  To: Bulmaro Renee DO  Sent: 4/8/2021 10:00 AM CDT  Subject: Visit Follow-up Question    Hello,    I was told that I need to do a HSG, day 7-10. That a message to referral nurse was sent to call me to schedule the HSG.  I haven't gagan

## 2021-04-09 NOTE — TELEPHONE ENCOUNTER
JIMTCJOSE ALEJANDRO,    HSG scheduled on Wed,04/14/21 at [facility]. Must arrive 45mins prior to check in. Must take 600mg of ibuprofen 30mins prior for cramping.     MD time 11am Wed,04/14/21 [facility] sent to [facility] as [employer]

## 2021-04-14 ENCOUNTER — PATIENT MESSAGE (OUTPATIENT)
Dept: OBGYN CLINIC | Facility: CLINIC | Age: 31
End: 2021-04-14

## 2021-04-14 ENCOUNTER — HOSPITAL ENCOUNTER (OUTPATIENT)
Dept: GENERAL RADIOLOGY | Facility: HOSPITAL | Age: 31
Discharge: HOME | End: 2021-04-14
Attending: OBSTETRICS & GYNECOLOGY
Payer: COMMERCIAL

## 2021-04-14 DIAGNOSIS — N97.9 FEMALE INFERTILITY: Primary | ICD-10-CM

## 2021-04-14 DIAGNOSIS — Z31.41 FERTILITY TESTING: ICD-10-CM

## 2021-04-14 PROCEDURE — 74740 X-RAY FEMALE GENITAL TRACT: CPT | Performed by: OBSTETRICS & GYNECOLOGY

## 2021-04-14 PROCEDURE — 58340 CATHETER FOR HYSTEROGRAPHY: CPT | Performed by: OBSTETRICS & GYNECOLOGY

## 2021-04-14 NOTE — TELEPHONE ENCOUNTER
From: Antonio Gonzalez  To: Joseph Renee DO  Sent: 4/14/2021 11:56 AM CDT  Subject: Test Results Question    Hi Dr. Katalina Brambila,    I know you said you wrote comments about my results but I do t see them on my side?

## 2021-04-15 NOTE — TELEPHONE ENCOUNTER
Order initiated, sent to Manage Care for approval, Manage care number provided for any question on referral. Carmelita Gloss Number provided to schedule an appointment.

## 2021-04-15 NOTE — TELEPHONE ENCOUNTER
I sent test result note. She has completed Fertility work-up and will need O referral for consult and therapy. With HMO, I believe we use [facility] ( Dr Holli Mohs ) in [facility]. Thank you.

## 2021-04-28 ENCOUNTER — TELEPHONE (OUTPATIENT)
Dept: OBGYN CLINIC | Facility: CLINIC | Age: 31
End: 2021-04-28

## 2021-04-28 ENCOUNTER — IMMUNIZATION (OUTPATIENT)
Dept: LAB | Facility: HOSPITAL | Age: 31
End: 2021-04-28
Attending: EMERGENCY MEDICINE
Payer: COMMERCIAL

## 2021-04-28 DIAGNOSIS — Z23 NEED FOR VACCINATION: Primary | ICD-10-CM

## 2021-04-28 PROCEDURE — 0012A SARSCOV2 VAC 100MCG/0.5ML IM: CPT

## 2021-04-28 NOTE — TELEPHONE ENCOUNTER
Patient calling stating the referral that [facility] sent needs to have a diagnosis code and state [facility]. They will not schedule her until this is completed.      Please contact patient when completed

## 2021-04-28 NOTE — TELEPHONE ENCOUNTER
Called  office and spoke to [facility] and advised her all information is right on referral and it does state global infertility with Diagnosis of female infertility stated pt printed only first page of referral so she saw it it wasn't listed, will fa

## 2021-10-23 ENCOUNTER — PATIENT MESSAGE (OUTPATIENT)
Dept: INTERNAL MEDICINE CLINIC | Facility: CLINIC | Age: 31
End: 2021-10-23

## 2021-10-24 NOTE — TELEPHONE ENCOUNTER
From: Katt Solis  To: Svetlana Polk MD  Sent: 10/23/2021 10:54 AM CDT  Subject: Flu shot    Hello,  I have an appointment for the flu shot next Tuesday. Do I go to Dr. Trish Nuñez office office.

## 2021-10-26 ENCOUNTER — IMMUNIZATION (OUTPATIENT)
Dept: INTERNAL MEDICINE CLINIC | Facility: CLINIC | Age: 31
End: 2021-10-26

## 2021-10-26 DIAGNOSIS — Z23 NEED FOR VACCINATION: Primary | ICD-10-CM

## 2021-10-26 PROCEDURE — 90471 IMMUNIZATION ADMIN: CPT | Performed by: INTERNAL MEDICINE

## 2021-10-26 PROCEDURE — 90686 IIV4 VACC NO PRSV 0.5 ML IM: CPT | Performed by: INTERNAL MEDICINE

## 2022-03-02 ENCOUNTER — PATIENT MESSAGE (OUTPATIENT)
Dept: OBGYN CLINIC | Facility: CLINIC | Age: 32
End: 2022-03-02

## 2022-03-03 ENCOUNTER — TELEPHONE (OUTPATIENT)
Dept: OBGYN CLINIC | Facility: CLINIC | Age: 32
End: 2022-03-03

## 2022-03-03 NOTE — TELEPHONE ENCOUNTER
Confirms next Tuesday will be released from fertility doctor. Confirms is 6w5d. Confirms taking PNV with DHA and FA. Confirms regular cycles every 30 days. Advised of rotating male and female practice. Advised first appt is with a nurse over the phone. Pt accepted first available OBN-PC appt on 3/15. Pt advised to have fertility records faxed to our office and number provided. Pt agrees and states understanding.

## 2022-03-10 ENCOUNTER — TELEPHONE (OUTPATIENT)
Dept: OBGYN CLINIC | Facility: CLINIC | Age: 32
End: 2022-03-10

## 2022-03-10 NOTE — TELEPHONE ENCOUNTER
Received fax, Records from [facility]. Pt has OBN appt on 3/15/22. Records in front office.

## 2022-03-15 ENCOUNTER — PATIENT MESSAGE (OUTPATIENT)
Dept: OBGYN CLINIC | Facility: CLINIC | Age: 32
End: 2022-03-15

## 2022-03-15 ENCOUNTER — TELEPHONE (OUTPATIENT)
Dept: OBGYN CLINIC | Facility: CLINIC | Age: 32
End: 2022-03-15

## 2022-03-15 ENCOUNTER — NURSE ONLY (OUTPATIENT)
Dept: OBGYN CLINIC | Facility: CLINIC | Age: 32
End: 2022-03-15
Payer: COMMERCIAL

## 2022-03-15 DIAGNOSIS — Z34.81 ENCOUNTER FOR SUPERVISION OF OTHER NORMAL PREGNANCY IN FIRST TRIMESTER: Primary | ICD-10-CM

## 2022-03-15 RX ORDER — CHOLECALCIFEROL (VITAMIN D3) 25 MCG
1 TABLET,CHEWABLE ORAL DAILY
COMMUNITY

## 2022-03-15 RX ORDER — CHOLECALCIFEROL (VITAMIN D3) 125 MCG
2000 CAPSULE ORAL DAILY
COMMUNITY

## 2022-03-15 RX ORDER — PROGESTERONE 100 MG/1
100 CAPSULE ORAL NIGHTLY
COMMUNITY

## 2022-03-15 RX ORDER — MELATONIN
400 DAILY
COMMUNITY

## 2022-03-15 NOTE — PROGRESS NOTES
Pt seen for OBN appt today with no complaints. Pt is a transfer from [facility]. Normal PN labs ordered. Pt advised all labs must be completed and resulted prior to MD appt. NPN appt with MD scheduled on 3/23 with CAP. Pt is pregnant with dichorionic, diamniotic twins via IVF. Pt instructed to get OP report from previous . Fax number given. Partner's name is Zaynab Stratton #[phone]; race:   Occupation: not currently working  Pt's race:     MEDICAL HISTORY    Anemia No    Anesthetic complications No    Anxiety/Depression  No    Autoimmune Disorder No    Asthma  No    Cancer No    Diabetes  Yes Diet controlled type 2, no longer has diabetes   Gyne/breast Surgery Yes 2017    Heart Disease No    Hepatitis/Liver Disease  No    History of blood transfusion No    History of abnormal pap No    Hypertension  No    Infertility  Yes Unexplained, IVF for this pregnancy   Kidney Disease/Frequent UTIs  No    Medication Allergies No    Latex Allergies No    Food Allergies  No    Neurological Disorder/Epilepsy No    Operations/Hospitalizations Yes 2007,cholecystectomy      TB exposure  No    Thyroid Dysfunction No    Trauma/Violence  No    Uterine Anomaly  No    Uterine Fibroids  No    Variocosities/DVTs No    Smoker No    Drug usage in prior year No    Alcohol No    Would you accept a blood transfusion? If no, are you a Islam?  Yes    No            INFECTION HISTORY    Chlamydia No    Pt or partner have hx of Genital Herpes No    Gonorrhea No    Hepatitis B No    HIV No    HPV No    MRSA No    Syphilis No    Tattoos No    Live with someone or Exposed to TB No    Rash or viral illness since LMP  No    Varicella Yes As a child   Recent Travel (or planned travel) to [facility] area for self and or partner No    Pets No        GENETICS SCREENING    Genetic Screening    No data filed           [facility]    Infant vaccinations Yes      Pt. Has answered NO 5P questions and has NO  risk factors. Pt. Given What pregnant women need to know handout.

## 2022-03-15 NOTE — TELEPHONE ENCOUNTER
One hour gtt and hgb A1c order placed. Pt informed. Pt also stated she was advised at OBN appt to get a copy of her c section operative report from [facility] and have it faxed to our office. Pt stated she contacted [facility] and was told that our office just needs to send a request via fax to [facility]. Pt stated she was told she does not need to sign [facility]. Pt provided RN with fax number [phone], Dr Arnaldo Armas. Request sent to [facility] for copy of pts c section op report.

## 2022-03-15 NOTE — TELEPHONE ENCOUNTER
8w5d seen for OBN-PC today. Pt has NPN appt scheduled with CAP on 3/23. Operative report from [facility] dated 7/6/17 placed in CAP's bin in [facility].

## 2022-03-15 NOTE — TELEPHONE ENCOUNTER
Pt had her OBN appt today. She is a transfer from [facility]. She is pregnant with twins. Pt has a history of gestational diabetes which went away after delivery in 2017. She was diagnosed with type 2 diabetes in 2020 and was diet controlled. Pt states is 2/2021 she was told that the was no longer diabetic. Message to ALFONZO.  For pn labs, should we order the 1 hr gtt or the HgbA1c and 24 hour urine protein and creatinine clearance? All other pn labs ordered at OBN appt. Just need to add correct diabetic labs.

## 2022-03-15 NOTE — TELEPHONE ENCOUNTER
One hour GCT as well as Hgb A1c please.  We won't order others unless she fails both GCT and 3 hour test.

## 2022-03-16 ENCOUNTER — TELEPHONE (OUTPATIENT)
Dept: OBGYN CLINIC | Facility: CLINIC | Age: 32
End: 2022-03-16

## 2022-03-16 NOTE — TELEPHONE ENCOUNTER
Pt is 8w6d and calling to report she just went to the bathroom and when she wiped there was brown-pink blood on toilet paper. Pt denies any blood in toilet or on underwear. Pt denies pain or cramping. Pt stated she just has nausea. Pt denies recent IC. Pt denies straining with BM. Pt is scheduled for NEW OB appt with CAP on 3/23. Message to [employer] (on call) for recs. Pt is a ALFONZO pt prior to pregnancy.

## 2022-03-16 NOTE — TELEPHONE ENCOUNTER
Based on OBN appt notes, pt did IVF and is pregnant with twins.      Pt accepted appt with TORRI tomorrow AM for exam.

## 2022-03-17 ENCOUNTER — HOSPITAL ENCOUNTER (OUTPATIENT)
Dept: ULTRASOUND IMAGING | Facility: HOSPITAL | Age: 32
Discharge: HOME | End: 2022-03-17
Attending: OBSTETRICS & GYNECOLOGY
Payer: COMMERCIAL

## 2022-03-17 ENCOUNTER — OFFICE VISIT (OUTPATIENT)
Dept: OBGYN CLINIC | Facility: CLINIC | Age: 32
End: 2022-03-17
Payer: COMMERCIAL

## 2022-03-17 VITALS
SYSTOLIC BLOOD PRESSURE: 135 MMHG | HEART RATE: 89 BPM | WEIGHT: 239 LBS | DIASTOLIC BLOOD PRESSURE: 84 MMHG | BODY MASS INDEX: 42 KG/M2

## 2022-03-17 DIAGNOSIS — O20.0 THREATENED ABORTION, ANTEPARTUM: ICD-10-CM

## 2022-03-17 DIAGNOSIS — O20.0 THREATENED ABORTION, ANTEPARTUM: Primary | ICD-10-CM

## 2022-03-17 PROCEDURE — 76801 OB US < 14 WKS SINGLE FETUS: CPT | Performed by: OBSTETRICS & GYNECOLOGY

## 2022-03-17 PROCEDURE — 76817 TRANSVAGINAL US OBSTETRIC: CPT | Performed by: OBSTETRICS & GYNECOLOGY

## 2022-03-17 PROCEDURE — 3075F SYST BP GE 130 - 139MM HG: CPT | Performed by: OBSTETRICS & GYNECOLOGY

## 2022-03-17 PROCEDURE — 3079F DIAST BP 80-89 MM HG: CPT | Performed by: OBSTETRICS & GYNECOLOGY

## 2022-03-17 PROCEDURE — 99213 OFFICE O/P EST LOW 20 MIN: CPT | Performed by: OBSTETRICS & GYNECOLOGY

## 2022-03-17 NOTE — PROGRESS NOTES
Received v/o from [address] [place] [address] for OB US hold and call for threatened ab. Called US and pt added at 12 pm today in [place]. Pt to arrive 15 min early for checkin. Pt notified and provided full bladder instructions.

## 2022-03-19 ENCOUNTER — LAB ENCOUNTER (OUTPATIENT)
Dept: LAB | Facility: HOSPITAL | Age: 32
End: 2022-03-19
Attending: OBSTETRICS & GYNECOLOGY
Payer: COMMERCIAL

## 2022-03-19 DIAGNOSIS — Z34.81 ENCOUNTER FOR SUPERVISION OF OTHER NORMAL PREGNANCY IN FIRST TRIMESTER: ICD-10-CM

## 2022-03-19 DIAGNOSIS — Z86.32 HISTORY OF GESTATIONAL DIABETES IN PRIOR PREGNANCY, CURRENTLY PREGNANT: ICD-10-CM

## 2022-03-19 DIAGNOSIS — O09.299 HISTORY OF GESTATIONAL DIABETES IN PRIOR PREGNANCY, CURRENTLY PREGNANT: ICD-10-CM

## 2022-03-19 LAB
ANTIBODY SCREEN: NEGATIVE
BASOPHILS # BLD AUTO: 0.02 X10(3) UL (ref 0–0.2)
BASOPHILS NFR BLD AUTO: 0.3 %
DEPRECATED RDW RBC AUTO: 42.5 FL (ref 35.1–46.3)
EOSINOPHIL # BLD AUTO: 0.07 X10(3) UL (ref 0–0.7)
EOSINOPHIL NFR BLD AUTO: 1 %
ERYTHROCYTE [DISTWIDTH] IN BLOOD BY AUTOMATED COUNT: 13.3 % (ref 11–15)
EST. AVERAGE GLUCOSE BLD GHB EST-MCNC: 171 MG/DL (ref 68–126)
GLUCOSE 1H P GLC SERPL-MCNC: 229 MG/DL
HBA1C MFR BLD: 7.6 % (ref ?–5.7)
HBV SURFACE AG SER-ACNC: <0.1 [IU]/L
HBV SURFACE AG SERPL QL IA: NONREACTIVE
HCT VFR BLD AUTO: 37.7 %
HGB BLD-MCNC: 12.2 G/DL
IMM GRANULOCYTES # BLD AUTO: 0.06 X10(3) UL (ref 0–1)
IMM GRANULOCYTES NFR BLD: 0.8 %
LYMPHOCYTES # BLD AUTO: 1.65 X10(3) UL (ref 1–4)
LYMPHOCYTES NFR BLD AUTO: 23 %
MCH RBC QN AUTO: 28.4 PG (ref 26–34)
MCHC RBC AUTO-ENTMCNC: 32.4 G/DL (ref 31–37)
MCV RBC AUTO: 87.7 FL
MONOCYTES # BLD AUTO: 0.4 X10(3) UL (ref 0.1–1)
MONOCYTES NFR BLD AUTO: 5.6 %
NEUTROPHILS # BLD AUTO: 4.96 X10 (3) UL (ref 1.5–7.7)
NEUTROPHILS # BLD AUTO: 4.96 X10(3) UL (ref 1.5–7.7)
NEUTROPHILS NFR BLD AUTO: 69.3 %
PLATELET # BLD AUTO: 203 10(3)UL (ref 150–450)
RBC # BLD AUTO: 4.3 X10(6)UL
RH BLOOD TYPE: POSITIVE
RUBV IGG SER-ACNC: 8.9 IU/ML (ref 10–?)
WBC # BLD AUTO: 7.2 X10(3) UL (ref 4–11)

## 2022-03-19 PROCEDURE — 83036 HEMOGLOBIN GLYCOSYLATED A1C: CPT

## 2022-03-19 PROCEDURE — 87340 HEPATITIS B SURFACE AG IA: CPT

## 2022-03-19 PROCEDURE — 82950 GLUCOSE TEST: CPT

## 2022-03-19 PROCEDURE — 36415 COLL VENOUS BLD VENIPUNCTURE: CPT

## 2022-03-19 PROCEDURE — 86762 RUBELLA ANTIBODY: CPT

## 2022-03-19 PROCEDURE — 86780 TREPONEMA PALLIDUM: CPT

## 2022-03-19 PROCEDURE — 3051F HG A1C>EQUAL 7.0%<8.0%: CPT

## 2022-03-19 PROCEDURE — 87086 URINE CULTURE/COLONY COUNT: CPT

## 2022-03-19 PROCEDURE — 86850 RBC ANTIBODY SCREEN: CPT

## 2022-03-19 PROCEDURE — 85025 COMPLETE CBC W/AUTO DIFF WBC: CPT

## 2022-03-19 PROCEDURE — 86901 BLOOD TYPING SEROLOGIC RH(D): CPT

## 2022-03-19 PROCEDURE — 86900 BLOOD TYPING SEROLOGIC ABO: CPT

## 2022-03-19 PROCEDURE — 87389 HIV-1 AG W/HIV-1&-2 AB AG IA: CPT

## 2022-03-19 NOTE — TELEPHONE ENCOUNTER
From: Mi Levi  Sent: 3/19/2022 9:00 AM CDT  To: Em [facility] Ob/Gyne Clinical Staff  Subject:  labs    Hel,  I will be going in on Monday morning to get the labs done.   Thanks,  Mi Levi

## 2022-03-22 PROBLEM — O24.319 PREGESTATIONAL DIABETES MELLITUS, MODIFIED WHITE CLASS B: Status: ACTIVE | Noted: 2022-03-22

## 2022-03-22 PROBLEM — Z28.39 RUBELLA NON-IMMUNE STATUS, ANTEPARTUM (HCC): Status: ACTIVE | Noted: 2022-03-22

## 2022-03-22 PROBLEM — O09.899 RUBELLA NON-IMMUNE STATUS, ANTEPARTUM: Status: ACTIVE | Noted: 2022-03-22

## 2022-03-22 PROBLEM — O24.319 PREGESTATIONAL DIABETES MELLITUS, MODIFIED WHITE CLASS B (HCC): Status: ACTIVE | Noted: 2022-03-22

## 2022-03-22 PROBLEM — Z28.39 RUBELLA NON-IMMUNE STATUS, ANTEPARTUM: Status: ACTIVE | Noted: 2022-03-22

## 2022-03-22 PROBLEM — O09.899 RUBELLA NON-IMMUNE STATUS, ANTEPARTUM (HCC): Status: ACTIVE | Noted: 2022-03-22

## 2022-03-23 ENCOUNTER — INITIAL PRENATAL (OUTPATIENT)
Dept: OBGYN CLINIC | Facility: CLINIC | Age: 32
End: 2022-03-23
Payer: COMMERCIAL

## 2022-03-23 ENCOUNTER — TELEPHONE (OUTPATIENT)
Dept: OBGYN CLINIC | Facility: CLINIC | Age: 32
End: 2022-03-23

## 2022-03-23 ENCOUNTER — HOSPITAL ENCOUNTER (OUTPATIENT)
Dept: ULTRASOUND IMAGING | Facility: HOSPITAL | Age: 32
Discharge: HOME | End: 2022-03-23
Attending: OBSTETRICS & GYNECOLOGY
Payer: COMMERCIAL

## 2022-03-23 VITALS
SYSTOLIC BLOOD PRESSURE: 128 MMHG | HEART RATE: 89 BPM | WEIGHT: 239 LBS | BODY MASS INDEX: 42 KG/M2 | DIASTOLIC BLOOD PRESSURE: 82 MMHG

## 2022-03-23 DIAGNOSIS — Z34.91 ENCOUNTER FOR SUPERVISION OF NORMAL PREGNANCY IN FIRST TRIMESTER, UNSPECIFIED GRAVIDITY: Primary | ICD-10-CM

## 2022-03-23 DIAGNOSIS — O20.0 THREATENED ABORTION, ANTEPARTUM: ICD-10-CM

## 2022-03-23 PROBLEM — Z98.891 PREVIOUS CESAREAN SECTION: Status: ACTIVE | Noted: 2022-03-23

## 2022-03-23 PROBLEM — O30.041 DICHORIONIC DIAMNIOTIC TWIN PREGNANCY IN FIRST TRIMESTER: Status: ACTIVE | Noted: 2022-03-23

## 2022-03-23 PROBLEM — O30.041 DICHORIONIC DIAMNIOTIC TWIN PREGNANCY IN FIRST TRIMESTER (HCC): Status: ACTIVE | Noted: 2022-03-23

## 2022-03-23 PROBLEM — Z78.9 CONCEIVED BY IN VITRO FERTILIZATION: Status: ACTIVE | Noted: 2022-03-23

## 2022-03-23 LAB
APPEARANCE: CLEAR
BILIRUBIN: NEGATIVE
GLUCOSE (URINE DIPSTICK): NEGATIVE MG/DL
KETONES (URINE DIPSTICK): 80 MG/DL
LEUKOCYTES: NEGATIVE
MULTISTIX LOT#: ABNORMAL NUMERIC
NITRITE, URINE: NEGATIVE
OCCULT BLOOD: NEGATIVE
PH, URINE: 6 (ref 4.5–8)
PROTEIN (URINE DIPSTICK): NEGATIVE MG/DL
SPECIFIC GRAVITY: 1.01 (ref 1–1.03)
URINE-COLOR: YELLOW
UROBILINOGEN,SEMI-QN: 0.2 MG/DL (ref 0–1.9)

## 2022-03-23 PROCEDURE — 3079F DIAST BP 80-89 MM HG: CPT | Performed by: OBSTETRICS & GYNECOLOGY

## 2022-03-23 PROCEDURE — 76802 OB US < 14 WKS ADDL FETUS: CPT | Performed by: OBSTETRICS & GYNECOLOGY

## 2022-03-23 PROCEDURE — 76801 OB US < 14 WKS SINGLE FETUS: CPT | Performed by: OBSTETRICS & GYNECOLOGY

## 2022-03-23 PROCEDURE — 81002 URINALYSIS NONAUTO W/O SCOPE: CPT | Performed by: OBSTETRICS & GYNECOLOGY

## 2022-03-23 PROCEDURE — 3074F SYST BP LT 130 MM HG: CPT | Performed by: OBSTETRICS & GYNECOLOGY

## 2022-03-23 NOTE — PROGRESS NOTES
Need records to determine the assigned RITO, wants salpingectomy at repeat , has pregest DM- needs to see diab ed but appt is in 2 weeks- will ask RN's to try to move up her appt, need US due to cannot see fetuses with transabd due to [facility] and body habitus

## 2022-03-23 NOTE — PROGRESS NOTES
Verbal from CAP for pt to have hold  &call for viability of twins. Called US and spoke with Janelle Vergara. Janelle Vergara stated that pt is scheduled for 6pm at [facility] for hold & call. Pt provided with parking and full bladder instructions.  CAP stated she will let TORRI (on call) know about hold & call

## 2022-03-23 NOTE — TELEPHONE ENCOUNTER
Received call from [facility] with Diabetic Ed. They have rescheduled patient for 3/25/22. Patient is aware.

## 2022-03-23 NOTE — TELEPHONE ENCOUNTER
Patient seen by CAP today for NPN. She is pregestational DM, needs Diabetic Ed asap. Patient is out of town next week, so scheduled 4/5/22. CAP states needs to be seen this week. Left detailed message for Diabetic Ed. Their office hours ended 4 minutes ago. Will try again tomorrow.

## 2022-03-24 ENCOUNTER — TELEPHONE (OUTPATIENT)
Dept: OBGYN CLINIC | Facility: CLINIC | Age: 32
End: 2022-03-24

## 2022-03-24 ENCOUNTER — PATIENT MESSAGE (OUTPATIENT)
Dept: OBGYN CLINIC | Facility: CLINIC | Age: 32
End: 2022-03-24

## 2022-03-24 LAB
C TRACH DNA SPEC QL NAA+PROBE: NEGATIVE
N GONORRHOEA DNA SPEC QL NAA+PROBE: NEGATIVE

## 2022-03-24 NOTE — TELEPHONE ENCOUNTER
From: Mouna Javier  To: Mikhail Laws. DO Víctor  Sent: 3/24/2022 9:00 AM CDT  Subject: Appointment     Hello,  I came in for my first prenatal appointment yesterday but I just remembered nobody told me when I should come in for my next appointment .     Thank you,  Mouna Javier

## 2022-03-24 NOTE — TELEPHONE ENCOUNTER
From: Leonardo Ruth  Sent: 3/24/2022 11:14 AM CDT  To: Em [facility] Ob/Gyne Clinical Staff  Subject: Appointment     Can I please  my prenatal packet tomorrow that I never got? Please give it to the front desks. I will be picking it up tomorrow. Thank you so much.

## 2022-03-25 ENCOUNTER — TELEPHONE (OUTPATIENT)
Dept: OBGYN CLINIC | Facility: CLINIC | Age: 32
End: 2022-03-25

## 2022-03-25 ENCOUNTER — HOSPITAL ENCOUNTER (OUTPATIENT)
Dept: ENDOCRINOLOGY | Facility: HOSPITAL | Age: 32
Discharge: HOME | End: 2022-03-25
Attending: OBSTETRICS & GYNECOLOGY
Payer: COMMERCIAL

## 2022-03-25 VITALS — BODY MASS INDEX: 42 KG/M2 | WEIGHT: 239 LBS

## 2022-03-25 DIAGNOSIS — O24.111 TYPE 2 DIABETES MELLITUS AFFECTING PREGNANCY IN FIRST TRIMESTER, ANTEPARTUM: Primary | ICD-10-CM

## 2022-03-25 LAB — T VAGINALIS RRNA SPEC QL NAA+PROBE: NEGATIVE

## 2022-03-25 NOTE — PROGRESS NOTES
Katharine Schirmer  : 1990 was seen for Gestational Diabetes Counseling: Individual    10 weeks gestation / twins     Date: 3/25/2022   Start time: 1:15  End time: 2:15     Obtained usual diet history:   B: 2 Egg frittatas, and 2 pieces of manzanares, with water   L: oscar salad with ham, turkey, provolone and egg,and water  Snacks: almonds and cheese   D: roast beef, potato, and zucchini   Bedtime snack: pickles, almonds and cashews    Patient has been advised to try and increase her CHO to meet GDM menu guidelines. Patient agrees and understands meal plan and guidelines    Education:     GDM Overview:  Reviewed gestational diabetes as diagnosis including target blood glucose values. Benefits, risks, and management options for improving/maintaining glucose control to mother/baby discussed. Instructed /demonstrated ability to perform blood glucose testing on: One Touch Verio    Discussed monitoring ketones. Healthy Eating:  Discussed nutrition concepts for pregnancy/healthy eating and effects of food on BG value. Timing of meals; what is a carbohydrate, protein, fat. Taught: Carb counting, label reading, meal planning. Suggested Calorie Level: 2000    Being Active:  Benefits and effects of activity on BG discussed. Monitoring:  Instructed on how to use glucose monitor/proper lancet disposal. Testing schedules are:   Fastin-95 mg/dL, Call MD if >95 mg/dL twice in 1 week   2 Hour Post Prandial:  120 md/dL, Call MD if >120 mg/dL twice in 1 week. Taking Medication:  Reviewed when medication might be indicated. Reducing Risk:  Effects of elevated blood glucose on mother/baby reviewed. Discussed management (hyperglycemia, hypoglycemia, sick day, DKA, other) and when to call provider. Post pregnancy management/prevention of Type 2 DM, and increased risk of having diabetes later in life reviewed. Healthy Coping:  Family involvement/social support encouraged.   Identification of lifestyle behaviors willing to change discussed. Training Tools Provided:  Printed materials covering each topic provided. Support Plan provided and reviewed. Patient Chosen Goals:      1. Follow recommended GDM meal plan. 2. Begin testing fasting glucose and 2 hours after meals   3. Bring glucose log to each MD office visit. 4. Encouraged activity if no restrictions. 5. Encouraged Charlean Self to call diabetes center with any questions or concerns. Patient verbalized understanding and has no further questions at this time.  Patient will follow up with educator by video in 2 weeks     Joe Thompson RN, MSN

## 2022-03-25 NOTE — TELEPHONE ENCOUNTER
Pt   is calling He called the insurance  needs a glucose Monitor  Pt is with him .  Asking  If you can call them so they can tell you what insurance needs , for approval

## 2022-03-25 NOTE — TELEPHONE ENCOUNTER
Pt states she was told at time of visit to contact insurance company and find out what glucose machine is covered. Pts  states insurance indicates that referral is needed to process the order. Informed that monitor and supplies was sent [facility] on file with recs to sub for what is covered by insurance.  states he does not wish to pay out of pocket for the machine or supplies and wants referral sent. Informed will be routed to referral department, and they will be back in office on Monday. Also encouraged pt to call [facility] to see if supplies and monitor have already been filled. To [facility] referral department, to review and advise.  Thank you

## 2022-03-25 NOTE — TELEPHONE ENCOUNTER
Spoke with pt and informed her that we discussed her dates due to she has 2 different dates in her IVF record. We used her embryo transfer date and the RITO calculated is 10/23/22. Pt agrees with this date and I documented to reflect this in the chart.

## 2022-03-26 ENCOUNTER — PATIENT MESSAGE (OUTPATIENT)
Dept: OBGYN CLINIC | Facility: CLINIC | Age: 32
End: 2022-03-26

## 2022-03-26 NOTE — TELEPHONE ENCOUNTER
9w6d seen for initial PN visit on 3/23. Pt has pregest DM and saw diabetes ed on 3/25. Pt asking about MFM referral- see panda msg. Pt's next PN is on 4/21. Msg to Kyra Hartmann [phone] to advise further.

## 2022-03-26 NOTE — TELEPHONE ENCOUNTER
From: Eliza Curiel  To: Yennifer Schmitz. [id number] [facility],   Sent: 3/26/2022 9:09 AM CDT  Subject: Maternal fetal     Hello,  Am I supposed to be seeing a maternal fetal doctor?

## 2022-03-28 ENCOUNTER — TELEPHONE (OUTPATIENT)
Dept: ENDOCRINOLOGY | Facility: HOSPITAL | Age: 32
End: 2022-03-28

## 2022-03-28 NOTE — TELEPHONE ENCOUNTER
Called Diabetic Ed and spoke Kristina Kimbrough who referred me to Karma Fay advised what was happening with pt.  States will take care of it and follow up with both pt and pharmacy

## 2022-03-28 NOTE — TELEPHONE ENCOUNTER
Pt visited with Aisha previously. Pt's  left voicemail requesting CGM for pt. Left a voicemail for pt to please call us back. Playing phone tag, called pt at request of OB GYN office this morning as well and left a message. Sent below message to [facility] and Dr. Chica Verma. Hello! I left a message for pt (playing phone tag), she is requesting a CGM. I tried to call her back to discuss this, noting that a CGM is not studied in GDM and it is unclear if this is the most accurate option, finger sticks are still recommended. Awaiting call back. If pt still insists on a CGM, are you ok with pt using this or would you continue to encourage finger sticks?  Thank you, Matt Arrington

## 2022-03-28 NOTE — TELEPHONE ENCOUNTER
Notified  Rosarioalessandro Lee that Diabetic Ed has left message to discuss. They will call diabetic ed today.

## 2022-03-28 NOTE — TELEPHONE ENCOUNTER
OB office reached out and noted pt was having difficulty with meter/payment. Called [facility], reported she picked up supplies on 03/25. Called pt, no answer, left a message that we're happy to discuss this further with her. There is an option of Relion Meter OTC at [facility] that has more affordable supplies and also, strips here for those in need (typically only medicaid/uninsured, but some exceptions apply).

## 2022-03-29 ENCOUNTER — TELEPHONE (OUTPATIENT)
Dept: OBGYN CLINIC | Facility: CLINIC | Age: 32
End: 2022-03-29

## 2022-03-29 NOTE — TELEPHONE ENCOUNTER
Kyra Hartmann [id number] notified of message below. States the CGM is not recommended during pregnancy. Studies have not proved accuracy during pregnancy. Radha Hernandez in DM ED notified. Radha Hernandez will call pt.

## 2022-03-29 NOTE — TELEPHONE ENCOUNTER
[facility] States pt's  is asking for continuous glucose monitoring. Pt is 10w2d. Message to CAP on call to please advise.

## 2022-03-29 NOTE — TELEPHONE ENCOUNTER
----- Message from Maame Lane sent at 3/29/2022  8:05 AM CDT -----  Regarding: FW: CGM help  Will route to NILAM glover and MD on call   ----- Message -----  From: Holly Mora RD  Sent: 3/28/2022   3:39 PM CDT  To: Arleth López MD  Subject: FW: CGM help                                     Hello! I left a message for pt (playing phone tag), she is requesting a CGM. I tried to call her back to discuss this, noting that a CGM is not studied in GDM and it is unclear if this is the most accurate option, finger sticks are still recommended. Awaiting call back. If pt still insists on a CGM, are you ok with pt using this or would you continue to encourage finger sticks? Thank you, Matt Arrington  ----- Message -----  From: Brittni Mccarty  Sent: 3/28/2022   3:24 PM CDT  To: Praveena Romano RD  Subject: CGM help                                         Matt Arrington,    Patient's , Freda Savage, called to expedite ordering and receiving a CGM. She's GDM, would be be able to contact  and let him know what?     Thank you,  Suresh

## 2022-03-30 ENCOUNTER — HOSPITAL ENCOUNTER (OUTPATIENT)
Dept: ENDOCRINOLOGY | Facility: HOSPITAL | Age: 32
Discharge: HOME | End: 2022-03-30
Attending: OBSTETRICS & GYNECOLOGY
Payer: COMMERCIAL

## 2022-03-30 ENCOUNTER — TELEPHONE (OUTPATIENT)
Dept: ENDOCRINOLOGY CLINIC | Facility: CLINIC | Age: 32
End: 2022-03-30

## 2022-03-30 DIAGNOSIS — O24.111 TYPE 2 DIABETES MELLITUS AFFECTING PREGNANCY IN FIRST TRIMESTER, ANTEPARTUM: Primary | ICD-10-CM

## 2022-03-30 NOTE — PROGRESS NOTES
Kelvin Chopra  : 1990 was seen for Individual Injection Instruction:    Date: 3/30/2022  Referring Provider: Víctor/Brie/Vira  Start time: 12:00P End time: 1:00P    LMP 2022 (Exact Date)     Boy and girl twins  Last pregnacy, pt was on insulin as well, was managed by endo at [address]    (830am) cup of milk, 3 hard boiled eggs, whole wheat with 2 tbsp of PB  (30g)  (snack) crunchy granola bar PB (20g)  (1p lunch) greek chicken salad, coney dog  Slim Yohan deli style snack stick, 2 jaguar micaela string cheese  (630p) brown rice, ricotta, ribeye (4 ounces), 10 pieces of crunchy mixed vegetable    Medication type, dose and frequency: Insulin NPH Flex Pens 6 units/bedtime and Insulin Novolog Flex Pen 4 units mealtime    Pen Needle Size:Anh 4mm 32 guage    Instructed on operation of device and patient was able to do correct return demonstration. Instructed on site selection and rotation of injections. Instructed on proper disposal of sharps. Reviewed proper storage of medication. Reviewed blood glucose testing. Reviewed hypoglycemia and the Rule of 15. Patient set goals for glucose management. Comments / Follow-up Recommendations:   See patient instructions from today. Send logs q 3 days to MD.   Discard pens after 28 days. Insulin Cloudy NPH at bedtime (think dreaming - cloudy) and Insulin Mealtime Insulin Clear (cleared for meals) pre meals  Endo follow up - did this for her last pregnancy at [facility] to see if they could get her in sooner   pens today; send records to MD in q 3-4 days (OB GYN office)  Carry glucose tabs 3-4 tabs if BG under 60-70      PT was previously requesting CGM; CGM is encouraged, if in addition to fingersticks, not replacing fingersticks in patients with Diabetes during pregnancy.      Martina Wong RD

## 2022-03-30 NOTE — PATIENT INSTRUCTIONS
Send logs q 3 days to MD.   Discard pens after 28 days.  Insulin Cloudy NPH at bedtime (think dreaming - cloudy) and Insulin Mealtime Insulin Clear (cleared for meals) pre meals  Endo follow up - did this for her last pregnancy at [facility] to see if they could get her in sooner   pens today; send records to MD in q 3-4 days (OB GYN office)  Carry glucose tabs 3-4 tabs if BG under 60-70  Insulin Green cooling case if going to be outside on trip for prolonged periods of time

## 2022-03-31 NOTE — TELEPHONE ENCOUNTER
Please contact patient to offer appt with me next week. Monday or Tuesday ideally but ok for later in the week if she prefers [facility]. Thanks.

## 2022-04-01 ENCOUNTER — APPOINTMENT (OUTPATIENT)
Dept: ENDOCRINOLOGY | Facility: HOSPITAL | Age: 32
End: 2022-04-01
Attending: OBSTETRICS & GYNECOLOGY

## 2022-04-05 ENCOUNTER — OFFICE VISIT (OUTPATIENT)
Dept: ENDOCRINOLOGY CLINIC | Facility: CLINIC | Age: 32
End: 2022-04-05
Payer: COMMERCIAL

## 2022-04-05 ENCOUNTER — TELEPHONE (OUTPATIENT)
Dept: ENDOCRINOLOGY CLINIC | Facility: CLINIC | Age: 32
End: 2022-04-05

## 2022-04-05 ENCOUNTER — APPOINTMENT (OUTPATIENT)
Dept: ENDOCRINOLOGY | Facility: HOSPITAL | Age: 32
End: 2022-04-05
Attending: OBSTETRICS & GYNECOLOGY

## 2022-04-05 VITALS
SYSTOLIC BLOOD PRESSURE: 119 MMHG | WEIGHT: 239 LBS | BODY MASS INDEX: 42 KG/M2 | DIASTOLIC BLOOD PRESSURE: 75 MMHG | HEART RATE: 72 BPM

## 2022-04-05 DIAGNOSIS — O24.111 PREGNANCY COMPLICATED BY PRE-EXISTING TYPE 2 DIABETES IN FIRST TRIMESTER: Primary | ICD-10-CM

## 2022-04-05 PROCEDURE — 3078F DIAST BP <80 MM HG: CPT

## 2022-04-05 PROCEDURE — 99215 OFFICE O/P EST HI 40 MIN: CPT

## 2022-04-05 PROCEDURE — 3074F SYST BP LT 130 MM HG: CPT

## 2022-04-05 NOTE — TELEPHONE ENCOUNTER
I saw patient today. She will need follow up monthly. Next appt should be with me in May. I do want her to see an MD as well which can be a follow up visit since she has already seen me. Ok to keep appt with me in May. Can schedule next visit with MD in June as follow up if something is available. If not, ok to keep appt in July with [facility].

## 2022-04-05 NOTE — TELEPHONE ENCOUNTER
Patient needs an appt. / she is scheduled for July 22nd 2022 but would really like to something sooner due to her pregnancy     pls call to discuss

## 2022-04-05 NOTE — PATIENT INSTRUCTIONS
A1C -7.6%--> HgA1c target is <6.0%     Insulin regimen:      NPH Kwik pen:   Inject 12 units daily at bedtime      Novolog Kwik pen:   Dose 8 units at meals (breakfast lunch and dinner)      We may need to adjust based on your blood sugars      Be sure to rotate all of your injections to avoid over using the same sites. It is very important to keep close watch on your blood sugar trends during the pregnancy   Insulin is the preferred medicine to control your sugars. We recommend checking your blood sugar  4 times per day  Before breakfast and after 2 hours after B, L and D meals      Blood sugar targets:   FASTING: LESS than 90   2 hour after meals: LESS than 120         You need to send your blood sugar readings every 3 days to start via [employer]. Please send me your readings on Friday 4/8/2022      It is important to follow a carb controlled meal plan.  You need carbs in order to give energy for the fetus to develop   Carbs:   15-30 gm Breakfast   30-45 gm lunch/dinner   15-30 gm HS snack w a protein

## 2022-04-08 ENCOUNTER — PATIENT MESSAGE (OUTPATIENT)
Dept: ENDOCRINOLOGY CLINIC | Facility: CLINIC | Age: 32
End: 2022-04-08

## 2022-04-08 ENCOUNTER — PATIENT MESSAGE (OUTPATIENT)
Dept: OBGYN CLINIC | Facility: CLINIC | Age: 32
End: 2022-04-08

## 2022-04-08 NOTE — TELEPHONE ENCOUNTER
From: Sara Dean  To: Shruthi Renee DO  Sent: 4/8/2022 9:11 AM CDT  Subject: Blood sugar    Good morning,  Here are my glucose numbers from the past few days.    Tues 4/5/2022: 958,629,806,278  Wed 4/6/2022: 850,012,631,357  Thurs 4/7/2022: 081,484,279,299  Fri 4/8/2022: 132  Breakfast, lunch and dinner- 8 units  Bedtime- 12 units    The endocrinologist rn has changed my units again: NPH nightly insulin to 20 units   Breakfast novolog insulin to 14 units   Lunch and dinner novolog insulin to12 units    Thank you,    Sara Dean

## 2022-04-08 NOTE — TELEPHONE ENCOUNTER
Replied to patient. NPH- 12-->20    Novolog- 8-8-8--> 14-12-14 with meals. She is to send readings again on Tuesday next week.

## 2022-04-08 NOTE — TELEPHONE ENCOUNTER
Cyndee Vanessa,    Please see below    Novolog 8-8-8  NPH 12 units at bedtime    Follow up with you 5/11/2022

## 2022-04-08 NOTE — TELEPHONE ENCOUNTER
From: Dafne Thao  To: EUSEBIO Ayala  Sent: 4/8/2022 8:07 AM CDT  Subject: Blood Sugar    Shimon Oneill,    Here are my glucose numbers from the past few days.    Tues 4/5/2022: 265,809,736,014  Wed 4/6/2022: 839,959,201,198  Thurs 4/7/2022: 177,646,222,275  Fri 4/8/2022: 132  Breakfast, lunch and dinner- 8 units  Bedtime- 12 units    Thank you,  Dafne Thao

## 2022-04-15 ENCOUNTER — PATIENT MESSAGE (OUTPATIENT)
Dept: OBGYN CLINIC | Facility: CLINIC | Age: 32
End: 2022-04-15

## 2022-04-15 ENCOUNTER — PATIENT MESSAGE (OUTPATIENT)
Dept: ENDOCRINOLOGY CLINIC | Facility: CLINIC | Age: 32
End: 2022-04-15

## 2022-04-15 RX ORDER — INSULIN ASPART 100 [IU]/ML
INJECTION, SOLUTION INTRAVENOUS; SUBCUTANEOUS
Qty: 20 ML | Refills: 2 | Status: SHIPPED | OUTPATIENT
Start: 2022-04-15 | End: 2022-04-18

## 2022-04-15 NOTE — TELEPHONE ENCOUNTER
From: Rishi Stevens  To: Jesus Manuel Diallo. [address] [facility], DO  Sent: 4/15/2022 3:55 PM CDT  Subject: Blood sugar    Hello,  Here are my numbers for my blood sugar:  -4/12/2022:131,135,73, 86(retested),142  -4/13/2022:126,140,88  -4/14/2022:131,131,127,145  -4/15/2022:130, 148,109    As of TODAY my insulin numbers have changed.   Breakfast and dinner 20 units  Lunch 14 units  Bedtime 30 units     Thanks,  Rishi Stevens

## 2022-04-15 NOTE — TELEPHONE ENCOUNTER
Current dose    NPH 26 units  Novolog     Reviewed with GABINO Mora to increase insulin regimen and send in Novolog refill per protocol    Updated Dosing:  NPH: 30 units  Novolo

## 2022-04-15 NOTE — TELEPHONE ENCOUNTER
From: Rishi Stevens  To: EUSEBIO Calle  Sent: 4/15/2022 8:10 AM CDT  Subject: Blood Sugar    Good morning Mai,    Here are my number for my blood sugar:  -4/12/2022:131,135,73(after lunch did not like how I felt. Ate like I was supposed to), 86(retested),142  -4/13/2022:126,140,88(after lunch again didn't like how I felt)  -4/14/2022:131,131,127,145  -4/15/2022:130    Also, I tried ordering my novolog and my insurance wont give me more because they gave me a 90 day supply a few weeks ago. The person at the pharmacy counter said that you would have to send in more refills that my units are changing and going up.     Thanks,  Rishi Stevens

## 2022-04-18 ENCOUNTER — HOSPITAL ENCOUNTER (OUTPATIENT)
Dept: PERINATAL CARE | Facility: HOSPITAL | Age: 32
Discharge: HOME | End: 2022-04-18
Attending: OBSTETRICS & GYNECOLOGY
Payer: COMMERCIAL

## 2022-04-18 ENCOUNTER — PATIENT MESSAGE (OUTPATIENT)
Dept: ENDOCRINOLOGY CLINIC | Facility: CLINIC | Age: 32
End: 2022-04-18

## 2022-04-18 ENCOUNTER — HOSPITAL ENCOUNTER (OUTPATIENT)
Dept: PERINATAL CARE | Facility: HOSPITAL | Age: 32
End: 2022-04-18
Attending: OBSTETRICS & GYNECOLOGY
Payer: COMMERCIAL

## 2022-04-18 VITALS
HEART RATE: 100 BPM | WEIGHT: 243 LBS | SYSTOLIC BLOOD PRESSURE: 147 MMHG | DIASTOLIC BLOOD PRESSURE: 73 MMHG | BODY MASS INDEX: 43 KG/M2

## 2022-04-18 DIAGNOSIS — Z98.891 PREVIOUS CESAREAN SECTION: ICD-10-CM

## 2022-04-18 DIAGNOSIS — O30.009 FIRST TRIMESTER SCREENING FOR TWIN PREGNANCY: ICD-10-CM

## 2022-04-18 DIAGNOSIS — O09.811 PREGNANCY RESULTING FROM IN VITRO FERTILIZATION IN FIRST TRIMESTER: ICD-10-CM

## 2022-04-18 DIAGNOSIS — O30.009 FIRST TRIMESTER SCREENING FOR TWIN PREGNANCY: Primary | ICD-10-CM

## 2022-04-18 DIAGNOSIS — Z78.9 CONCEIVED BY IN VITRO FERTILIZATION: ICD-10-CM

## 2022-04-18 DIAGNOSIS — Z36.89 FIRST TRIMESTER SCREENING FOR TWIN PREGNANCY: Primary | ICD-10-CM

## 2022-04-18 DIAGNOSIS — O24.119 PRE-EXISTING TYPE 2 INSULIN TREATED DIABETES MELLITUS DURING PREGNANCY (HCC): ICD-10-CM

## 2022-04-18 DIAGNOSIS — O99.210 MATERNAL MORBID OBESITY, ANTEPARTUM (HCC): ICD-10-CM

## 2022-04-18 DIAGNOSIS — O30.041 DICHORIONIC DIAMNIOTIC TWIN PREGNANCY IN FIRST TRIMESTER: ICD-10-CM

## 2022-04-18 DIAGNOSIS — E66.01 MATERNAL MORBID OBESITY, ANTEPARTUM (HCC): ICD-10-CM

## 2022-04-18 DIAGNOSIS — Z36.89 FIRST TRIMESTER SCREENING FOR TWIN PREGNANCY: ICD-10-CM

## 2022-04-18 DIAGNOSIS — Z79.4 PRE-EXISTING TYPE 2 INSULIN TREATED DIABETES MELLITUS DURING PREGNANCY (HCC): ICD-10-CM

## 2022-04-18 PROCEDURE — 76813 OB US NUCHAL MEAS 1 GEST: CPT | Performed by: OBSTETRICS & GYNECOLOGY

## 2022-04-18 PROCEDURE — 76814 OB US NUCHAL MEAS ADD-ON: CPT

## 2022-04-18 RX ORDER — INSULIN ASPART 100 [IU]/ML
INJECTION, SOLUTION INTRAVENOUS; SUBCUTANEOUS
Qty: 20 ML | Refills: 2 | Status: SHIPPED | OUTPATIENT
Start: 2022-04-18 | End: 2022-04-18

## 2022-04-18 RX ORDER — INSULIN ASPART 100 [IU]/ML
INJECTION, SOLUTION INTRAVENOUS; SUBCUTANEOUS
Qty: 30 ML | Refills: 2 | Status: SHIPPED | OUTPATIENT
Start: 2022-04-18

## 2022-04-18 NOTE — TELEPHONE ENCOUNTER
Mai,    Please update Rx as you see fit. I had sent in an Rx 4/15 with updated doses. I am wondering if the doses are going to change, if it would make more sense to do a max TDD and as directed?     30 day supply with current dosing would be around 21ml (7 pens) which is similar to Rx sent 4/15

## 2022-04-18 NOTE — PROGRESS NOTES
Pt for 1st tri screen  Denies pregnancy complaints    Pt rakesh she had Genetics with embryos    Dr Marcial Vo following pt for Type 2 dm Insulin Managment

## 2022-04-21 ENCOUNTER — ROUTINE PRENATAL (OUTPATIENT)
Dept: OBGYN CLINIC | Facility: CLINIC | Age: 32
End: 2022-04-21
Payer: COMMERCIAL

## 2022-04-21 VITALS
WEIGHT: 241.38 LBS | SYSTOLIC BLOOD PRESSURE: 136 MMHG | HEART RATE: 94 BPM | DIASTOLIC BLOOD PRESSURE: 77 MMHG | BODY MASS INDEX: 43 KG/M2

## 2022-04-21 DIAGNOSIS — Z34.91 ENCOUNTER FOR SUPERVISION OF NORMAL PREGNANCY IN FIRST TRIMESTER, UNSPECIFIED GRAVIDITY: Primary | ICD-10-CM

## 2022-04-21 PROBLEM — Z13.79 GENETIC SCREENING: Status: ACTIVE | Noted: 2022-04-21

## 2022-04-21 LAB
APPEARANCE: CLEAR
BILIRUBIN: NEGATIVE
GLUCOSE (URINE DIPSTICK): NEGATIVE MG/DL
KETONES (URINE DIPSTICK): NEGATIVE MG/DL
LEUKOCYTES: NEGATIVE
MULTISTIX LOT#: 1027 NUMERIC
NITRITE, URINE: NEGATIVE
OCCULT BLOOD: NEGATIVE
PH, URINE: 6.5 (ref 4.5–8)
PROTEIN (URINE DIPSTICK): NEGATIVE MG/DL
SPECIFIC GRAVITY: 1.02 (ref 1–1.03)
URINE-COLOR: YELLOW
UROBILINOGEN,SEMI-QN: 0.2 MG/DL (ref 0–1.9)

## 2022-04-21 PROCEDURE — 3075F SYST BP GE 130 - 139MM HG: CPT | Performed by: OBSTETRICS & GYNECOLOGY

## 2022-04-21 PROCEDURE — 3078F DIAST BP <80 MM HG: CPT | Performed by: OBSTETRICS & GYNECOLOGY

## 2022-04-21 PROCEDURE — 81002 URINALYSIS NONAUTO W/O SCOPE: CPT | Performed by: OBSTETRICS & GYNECOLOGY

## 2022-04-21 RX ORDER — HUMAN INSULIN 100 [IU]/ML
INJECTION, SUSPENSION SUBCUTANEOUS
COMMUNITY
Start: 2022-04-15

## 2022-04-22 ENCOUNTER — PATIENT MESSAGE (OUTPATIENT)
Dept: OBGYN CLINIC | Facility: CLINIC | Age: 32
End: 2022-04-22

## 2022-04-22 ENCOUNTER — TELEPHONE (OUTPATIENT)
Dept: OBGYN CLINIC | Facility: CLINIC | Age: 32
End: 2022-04-22

## 2022-04-22 ENCOUNTER — PATIENT MESSAGE (OUTPATIENT)
Dept: ENDOCRINOLOGY CLINIC | Facility: CLINIC | Age: 32
End: 2022-04-22

## 2022-04-22 RX ORDER — PEN NEEDLE, DIABETIC 32 GX3/16"
NEEDLE, DISPOSABLE MISCELLANEOUS
Qty: 400 EACH | Refills: 0 | Status: SHIPPED | OUTPATIENT
Start: 2022-04-22 | End: 2022-04-27 | Stop reason: RX

## 2022-04-22 NOTE — TELEPHONE ENCOUNTER
From: Enrique Floyd  To: EUSEBIO Carrizales  Sent: 4/22/2022 12:08 PM CDT  Subject: Blood Sugar    Hi Mai,  The insulin units I am on are:  Breakfast:24 units  Lunch:18 units  Dinner:24 units  NPH: 36 units (With this amount when I inject, I am getting a bubble with the fluid in it. Is there another way to prevent it? I change the needle always and I tried doing half on one side and half on the other but then I get little bubbles).   Here are my glucose numbers:  4/18/2022:134,144,142,146  -4/19/2022:127,146,109,125  -4/20/2022:122,148,140,127  -4/21/2022:126,135,95,168  -4/22/2022:118,140  Thanks,  Enrique Floyd

## 2022-04-22 NOTE — TELEPHONE ENCOUNTER
Sent to Elvia Corrales nurse practitioner. Patient, Tati Issa,  86, is having her blood sugars monitored by you. Pt sent her blood sugars to us that your monitored and she is now taking 28 + 22 + 28 + 42N. NJG states she needs  tighter control and continue to send her blood sugars to you every 3-4 days.

## 2022-04-22 NOTE — TELEPHONE ENCOUNTER
Thank you for the note and update! I have asked her to send her readings every 3-4 days and she has been good about this. I have been adjusting the insulin about every 3-4 days as well. I will continue to monitor her readings closely and adjust accordingly to get her to goal. Thank you for reaching out and I will follow up with the patient on Monday for her readings again.

## 2022-04-22 NOTE — TELEPHONE ENCOUNTER
From: Melvin Book  To: Therman Angelucci. DO Víctor  Sent: 4/22/2022 1:49 PM CDT  Subject: Blood sugar    Hello,  As of today my insulin units have been changed from my endo.     TODAY they have been changed to:  NPH:42 units  Breakfast:28 units  Lunch:22 units  Dinner:28 units    The insulin units I WAS on are:  Breakfast:24 units  Lunch:18 units  Dinner:24 units  NPH: 36 units     Here are my glucose numbers:  4/18/2022:134,144,142,146  -4/19/2022:127,146,109,125  -4/20/2022:122,148,140,127  -4/21/2022:126,135,95,168  -4/22/2022:118,140,126

## 2022-04-27 ENCOUNTER — LAB ENCOUNTER (OUTPATIENT)
Dept: LAB | Facility: HOSPITAL | Age: 32
End: 2022-04-27
Attending: OBSTETRICS & GYNECOLOGY
Payer: COMMERCIAL

## 2022-04-27 ENCOUNTER — TELEPHONE (OUTPATIENT)
Dept: ENDOCRINOLOGY CLINIC | Facility: CLINIC | Age: 32
End: 2022-04-27

## 2022-04-27 DIAGNOSIS — Z34.91 ENCOUNTER FOR SUPERVISION OF NORMAL PREGNANCY IN FIRST TRIMESTER, UNSPECIFIED GRAVIDITY: ICD-10-CM

## 2022-04-27 LAB
ALBUMIN SERPL-MCNC: 2.9 G/DL (ref 3.4–5)
ALBUMIN/GLOB SERPL: 0.9 {RATIO} (ref 1–2)
ALP LIVER SERPL-CCNC: 54 U/L
ALT SERPL-CCNC: 17 U/L
ANION GAP SERPL CALC-SCNC: 6 MMOL/L (ref 0–18)
AST SERPL-CCNC: 9 U/L (ref 15–37)
BILIRUB SERPL-MCNC: 0.1 MG/DL (ref 0.1–2)
BUN BLD-MCNC: 11 MG/DL (ref 7–18)
BUN/CREAT SERPL: 19.3 (ref 10–20)
CALCIUM BLD-MCNC: 8.6 MG/DL (ref 8.5–10.1)
CHLORIDE SERPL-SCNC: 108 MMOL/L (ref 98–112)
CO2 SERPL-SCNC: 24 MMOL/L (ref 21–32)
CREAT BLD-MCNC: 0.57 MG/DL
CREAT UR-SCNC: 154 MG/DL
FASTING STATUS PATIENT QL REPORTED: NO
GLOBULIN PLAS-MCNC: 3.4 G/DL (ref 2.8–4.4)
GLUCOSE BLD-MCNC: 63 MG/DL (ref 70–99)
OSMOLALITY SERPL CALC.SUM OF ELEC: 283 MOSM/KG (ref 275–295)
POTASSIUM SERPL-SCNC: 4.3 MMOL/L (ref 3.5–5.1)
PROT SERPL-MCNC: 6.3 G/DL (ref 6.4–8.2)
PROT UR-MCNC: 16 MG/DL
PROT/CREAT UR-RTO: 0.1
SODIUM SERPL-SCNC: 138 MMOL/L (ref 136–145)
TSI SER-ACNC: 0.53 MIU/ML (ref 0.36–3.74)

## 2022-04-27 PROCEDURE — 84156 ASSAY OF PROTEIN URINE: CPT

## 2022-04-27 PROCEDURE — 84443 ASSAY THYROID STIM HORMONE: CPT

## 2022-04-27 PROCEDURE — 82570 ASSAY OF URINE CREATININE: CPT

## 2022-04-27 PROCEDURE — 80053 COMPREHEN METABOLIC PANEL: CPT

## 2022-04-27 PROCEDURE — 36415 COLL VENOUS BLD VENIPUNCTURE: CPT

## 2022-04-27 NOTE — TELEPHONE ENCOUNTER
Insulin Pen Needle (CAREFINE PEN NEEDLES) 32G X 5 MM Does not apply Misc, Use to inject insulin 4x daily, Disp: 400 each, Rfl: 0    REASON FOR REQUEST:   Product Backordered/unavailable     PHARMACY COMMENTS:   Product Backordered/unavailable: Carefine is on back order please change

## 2022-04-27 NOTE — TELEPHONE ENCOUNTER
Pen needles on backorder per pharmacy. Similar covered brand sent per written order. Patient has been notified via [employer].

## 2022-04-27 NOTE — TELEPHONE ENCOUNTER
Similar covered brand pen needles sent per written order. Patient has been notified via [employer]. Please see mychart encounter.

## 2022-04-29 ENCOUNTER — PATIENT MESSAGE (OUTPATIENT)
Dept: OBGYN CLINIC | Facility: CLINIC | Age: 32
End: 2022-04-29

## 2022-04-29 NOTE — TELEPHONE ENCOUNTER
From: Rishi Stevens  To: Jesus Manuel Diallo. [address] [facility], DO  Sent: 4/29/2022 8:06 AM CDT  Subject: Blood Sugar    Goodmorning,  Here are my current insulin units as well as my glucose numbers. Only my NPH has been changed yesterday 4/28/2022 from endo.      Insulin units:   NPH:44----changed yesterday (4//28) to 48 units  Breakfast:28----remains the same  Lunch:22----remains the same  Dinner:28----remains the same     Glucose numbers:  4/25/2022:93,71,107,119  4/26/2022:116,87,3,120  4/27/2022:112,116,93,102  4/28/2022:133,110,81,120  4/29/2022:86    Risih Stevens

## 2022-05-06 ENCOUNTER — PATIENT MESSAGE (OUTPATIENT)
Dept: OBGYN CLINIC | Facility: CLINIC | Age: 32
End: 2022-05-06

## 2022-05-06 ENCOUNTER — PATIENT MESSAGE (OUTPATIENT)
Dept: ENDOCRINOLOGY CLINIC | Facility: CLINIC | Age: 32
End: 2022-05-06

## 2022-05-06 NOTE — TELEPHONE ENCOUNTER
Dr Acuna Erps,    Since Justice Po is out -- OK for these recommendations for GDM?     NPH 48 --> 52  Breakfast 28  Lunch 22  Dinner 28 --> 29    Send BG on Monday

## 2022-05-06 NOTE — TELEPHONE ENCOUNTER
From: Kelvin Chopra  To:  Rachelle Severe, APRN  Sent: 5/6/2022 10:28 AM CDT  Subject: Blood Sugar    Goodmorning,    Here are my current insulin units:  NPH:48  Breakfast:28  Lunch:22  Dinner:28    Daily glucose numbers:  -Tuesday 5/3/2022:114,120,80,120  Wednesday 5/4/2022:135,118,83,119  Thursday 5/5/2022:119,99,118,121  Friday 5/6/2022:140

## 2022-05-06 NOTE — TELEPHONE ENCOUNTER
Yes but I would keep the doses even numbers. So change change dinner to 30 units instead of 29. Thanks.

## 2022-05-11 ENCOUNTER — OFFICE VISIT (OUTPATIENT)
Dept: ENDOCRINOLOGY CLINIC | Facility: CLINIC | Age: 32
End: 2022-05-11
Payer: COMMERCIAL

## 2022-05-11 VITALS
BODY MASS INDEX: 44 KG/M2 | SYSTOLIC BLOOD PRESSURE: 133 MMHG | DIASTOLIC BLOOD PRESSURE: 76 MMHG | HEART RATE: 98 BPM | WEIGHT: 246.25 LBS

## 2022-05-11 DIAGNOSIS — O24.111 PREGNANCY COMPLICATED BY PRE-EXISTING TYPE 2 DIABETES IN FIRST TRIMESTER: Primary | ICD-10-CM

## 2022-05-11 LAB
CARTRIDGE LOT#: ABNORMAL NUMERIC
GLUCOSE BLOOD: 148
HEMOGLOBIN A1C: 6.2 % (ref 4.3–5.6)
TEST STRIP LOT #: NORMAL NUMERIC

## 2022-05-11 PROCEDURE — 3044F HG A1C LEVEL LT 7.0%: CPT

## 2022-05-11 PROCEDURE — 3075F SYST BP GE 130 - 139MM HG: CPT

## 2022-05-11 PROCEDURE — 82947 ASSAY GLUCOSE BLOOD QUANT: CPT

## 2022-05-11 PROCEDURE — 3078F DIAST BP <80 MM HG: CPT

## 2022-05-11 PROCEDURE — 99214 OFFICE O/P EST MOD 30 MIN: CPT

## 2022-05-11 PROCEDURE — 83036 HEMOGLOBIN GLYCOSYLATED A1C: CPT

## 2022-05-11 RX ORDER — INSULIN ASPART 100 [IU]/ML
INJECTION, SOLUTION INTRAVENOUS; SUBCUTANEOUS
Qty: 30 ML | Refills: 3 | COMMUNITY
Start: 2022-05-11

## 2022-05-11 RX ORDER — HUMAN INSULIN 100 [IU]/ML
58 INJECTION, SUSPENSION SUBCUTANEOUS DAILY
Qty: 30 ML | Refills: 3 | Status: SHIPPED | OUTPATIENT
Start: 2022-05-11

## 2022-05-11 NOTE — PATIENT INSTRUCTIONS
-Increase NPH 58 insulin to units subcutaneous nightly  -Change Novolog 28-20-30 to units TID with meals. 4 3

## 2022-05-17 ENCOUNTER — PATIENT MESSAGE (OUTPATIENT)
Dept: OBGYN CLINIC | Facility: CLINIC | Age: 32
End: 2022-05-17

## 2022-05-17 NOTE — TELEPHONE ENCOUNTER
From: Candelaria Vázquez  To: Jeannie Villaseñor.  [address] [facility], DO  Sent: 5/17/2022 11:10 AM CDT  Subject: Blood Sugar Update    Good morning,  Insulin I was on and currently changed to:  NPH: 52 units changed to 58 5/11 then to 62 units as    of today 5/17  Breakfast: 28 units-has not changed  Lunch: 22 units-changed to 20 5/11 (lowered)-    remains the same as of 5/17  Dinner: 30 units-has not changed-remains the same    as of 5/17    Daily glucose numbers:  5/3/2022:114,120,80,120  5/4/2022:135,118,83,119  5/5/2022:119,99,118,121  5/6/2022:140,119,107,94  5/7/2022:139,120,88,110  5/8/2022:126,110,120,118  5/9/2022:122,111,67,93  5/10/2022:116,120,68,115  5/11/2022:120,108,105,120  5/12/2022:110,112,92,94  5/13/2022:116,89,105,74  5/14/2022:83,58,110,120  5/15/2022:108,80,67,102  5/16/2022:103,118,110,107  Candelaria Bar

## 2022-05-19 ENCOUNTER — ROUTINE PRENATAL (OUTPATIENT)
Dept: OBGYN CLINIC | Facility: CLINIC | Age: 32
End: 2022-05-19
Payer: COMMERCIAL

## 2022-05-19 ENCOUNTER — LAB ENCOUNTER (OUTPATIENT)
Dept: LAB | Facility: HOSPITAL | Age: 32
End: 2022-05-19
Attending: OBSTETRICS & GYNECOLOGY
Payer: COMMERCIAL

## 2022-05-19 VITALS
BODY MASS INDEX: 44 KG/M2 | SYSTOLIC BLOOD PRESSURE: 117 MMHG | WEIGHT: 249.19 LBS | DIASTOLIC BLOOD PRESSURE: 74 MMHG | HEART RATE: 93 BPM

## 2022-05-19 DIAGNOSIS — Z34.91 ENCOUNTER FOR SUPERVISION OF NORMAL PREGNANCY IN FIRST TRIMESTER, UNSPECIFIED GRAVIDITY: Primary | ICD-10-CM

## 2022-05-19 DIAGNOSIS — Z34.91 ENCOUNTER FOR SUPERVISION OF NORMAL PREGNANCY IN FIRST TRIMESTER, UNSPECIFIED GRAVIDITY: ICD-10-CM

## 2022-05-19 LAB
APPEARANCE: CLEAR
BILIRUBIN: NEGATIVE
GLUCOSE (URINE DIPSTICK): NEGATIVE MG/DL
KETONES (URINE DIPSTICK): NEGATIVE MG/DL
LEUKOCYTES: NEGATIVE
MULTISTIX LOT#: 1027 NUMERIC
NITRITE, URINE: NEGATIVE
OCCULT BLOOD: NEGATIVE
PH, URINE: 6 (ref 4.5–8)
PROTEIN (URINE DIPSTICK): NEGATIVE MG/DL
SPECIFIC GRAVITY: 1.01 (ref 1–1.03)
URINE-COLOR: YELLOW
UROBILINOGEN,SEMI-QN: 0.2 MG/DL (ref 0–1.9)

## 2022-05-19 PROCEDURE — 3078F DIAST BP <80 MM HG: CPT | Performed by: OBSTETRICS & GYNECOLOGY

## 2022-05-19 PROCEDURE — 81511 FTL CGEN ABNOR FOUR ANAL: CPT

## 2022-05-19 PROCEDURE — 3074F SYST BP LT 130 MM HG: CPT | Performed by: OBSTETRICS & GYNECOLOGY

## 2022-05-19 PROCEDURE — 36415 COLL VENOUS BLD VENIPUNCTURE: CPT

## 2022-05-19 PROCEDURE — 81002 URINALYSIS NONAUTO W/O SCOPE: CPT | Performed by: OBSTETRICS & GYNECOLOGY

## 2022-05-20 ENCOUNTER — TELEPHONE (OUTPATIENT)
Dept: OBGYN CLINIC | Facility: CLINIC | Age: 32
End: 2022-05-20

## 2022-05-20 NOTE — TELEPHONE ENCOUNTER
Patient is 17w5d with twins calling to report very faint pink spotting when wiping this AM. She states this AM she felt like she needed to have a BM, was sitting and straining, but decided she actually didn't need to go. When she wiped she saw <1 inch streak of faint pink on toilet paper. Since then has noted 1 little spot of pink on a panty liner. She notes low back discomfort, rated 2/10 starting this AM. Last BM was yesterday night. States having very occasional constipation. Denies recent IC. Patient with previous episode of spotting at 8 weeks. No appts in office today. To VIKIK on call to advise.

## 2022-05-20 NOTE — TELEPHONE ENCOUNTER
Per JLK- if spotting has resolved nothing further to do. If pt is nervous and would like to be seen will need to ask TORRI to add on tomorrow. If bleeding worsens over weekend, needs ER. Patient notified. She states no further spotting since this AM. She will monitor. If further pink spotting should call first thing tomorrow AM, so we can ask TORRI for add-on. If BRB, cramping, pain or clots, needs ER. Patient verbalized understanding.

## 2022-05-22 LAB
AFP SMOKING: NO
FAMILY HISTORY OF ANEUPLOIDY: NO
FAMILY HX NEURAL TUBE DEFECT: NO
INSULIN REQ MATERNAL DIABETES: NO
MATERNAL AGE OF DELIVERY: 33.8 YR
MOM FOR AFP: 2.28
MOM FOR DIA: 1.8
MOM FOR HCG: 3.39
MOM FOR UE3: 1.22
PATIENT'S AFP: 68 NG/ML
PATIENT'S DIA: 224 PG/ML
PATIENT'S HCG: NORMAL IU/L
PATIENT'S UE3: 1.42 NG/ML

## 2022-05-24 ENCOUNTER — PATIENT MESSAGE (OUTPATIENT)
Dept: ENDOCRINOLOGY CLINIC | Facility: CLINIC | Age: 32
End: 2022-05-24

## 2022-05-24 ENCOUNTER — PATIENT MESSAGE (OUTPATIENT)
Dept: OBGYN CLINIC | Facility: CLINIC | Age: 32
End: 2022-05-24

## 2022-05-24 NOTE — TELEPHONE ENCOUNTER
From: Tati Issa  To:  EUSEBIO Enrique  Sent: 5/24/2022 12:42 PM CDT  Subject: Blood Sugar    Hello,  Here are the current insulin units I am on:  NPH:62  Breakfast:28  Lunch:20  Dinner:30    Current glucose readings:  5/17:110,114,80,120  5/18:104,109,119,110  5/19:105,67,88,70  5/20:118,112,85,118  5/21:112,120,118,83  5/22:108,110,90,120  5/23:109,112,97,103  5/24:85,93  Tati Issa changes.  - No localized midline bony tenderness.   - No change in pain with forward flexion  - SLR test negative     No CVA tenderness to percussion  Neurologic:    No obvious neurological deficits. Patient moves without obvious difficulty or weakness. Vascular:    Femoral & Distal pulses, & capillary refill intact bilateral lower extremities    ED COURSE & MEDICAL DECISION MAKING     Based on Pt's history (including stratification of the above red flags) & physical exam findings today, I do not see evidence of spinal cord compression/focal neuro deficits, cauda equina syndrome, epidural abscess, or osteomyelitis on exam today. History and exam is consistent with musculoskeletal back pain - Symptomatic treatment provided today. I discussed stretching exercises and avoid vigorous activity today at bedside. I recommend supportive OTC back brace for the next several days. I recommend followup with primary care provider vs ortho spine over the next several days for recheck. Patient agrees to return emergency department if symptoms worsen or any new symptoms develop - this was discussed in detail at beside with Pt. Vital signs and nursing notes reviewed during ED course. I have independently evaluated this patient . Supervising MD present in the Emergency Department, available for consultation, throughout entirety of  patient care. Clinical  IMPRESSION    1. Acute bilateral low back pain without sciatica        Comment: Please note this report has been produced using speech recognition software and may contain errors related to that system including errors in grammar, punctuation, and spelling, as well as words and phrases that may be inappropriate. If there are any questions or concerns please feel free to contact the dictating provider for clarification.       Ellen Benjamin 411, PA  12/19/18 [id number]

## 2022-05-24 NOTE — TELEPHONE ENCOUNTER
From: Mouna Javier  To: Mikhail Laws. DO Víctor  Sent: 5/24/2022 1:23 PM CDT  Subject: Blood Sugar    Hello,  Here are the current insulin units I am on:  NPH:62. ..changed today to 66 units  Breakfast:28. ..remains the same  Lunch:20. ..remains the same  Dinner:30. ..remains the same    66+05+52+08     Current glucose readings:  5/17:110,114,80,120  5/18:104,109,119,110  5/19:105,67,88,70  5/20:118,112,85,118  5/21:112,120,118,83  5/22:108,110,90,120  5/23:109,112,97,103  5/15:80,30  Mouna Javier

## 2022-05-31 ENCOUNTER — PATIENT MESSAGE (OUTPATIENT)
Dept: ENDOCRINOLOGY CLINIC | Facility: CLINIC | Age: 32
End: 2022-05-31

## 2022-05-31 ENCOUNTER — PATIENT MESSAGE (OUTPATIENT)
Dept: OBGYN CLINIC | Facility: CLINIC | Age: 32
End: 2022-05-31

## 2022-05-31 RX ORDER — INSULIN ASPART 100 [IU]/ML
INJECTION, SOLUTION INTRAVENOUS; SUBCUTANEOUS
Qty: 30 ML | Refills: 2 | Status: ON HOLD | OUTPATIENT
Start: 2022-05-31

## 2022-05-31 NOTE — TELEPHONE ENCOUNTER
From: Enrique Floyd  To:  EUSEBIO Carrizales  Sent: 5/31/2022 10:55 AM CDT  Subject: Blood Sugar    Goodmorning,  Here are my insulin units that I am on:  NPH:66  Breakfast:28  Lunch:20  Dinner:30    Here are my glucose numbers:  5/24:85,93,120,119  5/25:104,98,92,110  5/26:116,116,80,120  5/27:104,100,86,111  5/28:102,100,63,120  5/29:98,110,111,74  5/30:88,92,107,112  5/31:105    Enrique Floyd

## 2022-06-01 NOTE — PROGRESS NOTES
FHT's by [facility]. Quad today. Current insulin managed by Dr Clifford Ferreira: [id number]. MFM scheduled.

## 2022-06-06 ENCOUNTER — HOSPITAL ENCOUNTER (INPATIENT)
Dept: PERINATAL CARE | Facility: HOSPITAL | Age: 32
Discharge: HOME | End: 2022-06-06
Attending: OBSTETRICS & GYNECOLOGY
Payer: COMMERCIAL

## 2022-06-06 ENCOUNTER — HOSPITAL ENCOUNTER (OUTPATIENT)
Facility: HOSPITAL | Age: 32
Setting detail: OBSERVATION
Discharge: HOME | End: 2022-06-07
Attending: OBSTETRICS & GYNECOLOGY | Admitting: OBSTETRICS & GYNECOLOGY
Payer: COMMERCIAL

## 2022-06-06 DIAGNOSIS — Z78.9 CONCEIVED BY IN VITRO FERTILIZATION: ICD-10-CM

## 2022-06-06 DIAGNOSIS — O24.319 PREGESTATIONAL DIABETES MELLITUS, MODIFIED WHITE CLASS B: ICD-10-CM

## 2022-06-06 DIAGNOSIS — O46.90 VAGINAL BLEEDING DURING PREGNANCY: Primary | ICD-10-CM

## 2022-06-06 DIAGNOSIS — O30.041 DICHORIONIC DIAMNIOTIC TWIN PREGNANCY IN FIRST TRIMESTER: ICD-10-CM

## 2022-06-06 PROBLEM — E11.9 DIET-CONTROLLED DIABETES MELLITUS (HCC): Status: RESOLVED | Noted: 2021-01-28 | Resolved: 2022-06-06

## 2022-06-06 PROBLEM — Z34.90 PREGNANCY: Status: ACTIVE | Noted: 2022-06-06

## 2022-06-06 PROBLEM — Z34.90 PREGNANCY (HCC): Status: RESOLVED | Noted: 2022-06-06 | Resolved: 2022-06-06

## 2022-06-06 PROBLEM — Z34.90 PREGNANCY (HCC): Status: ACTIVE | Noted: 2022-06-06

## 2022-06-06 PROBLEM — Z98.890 HX OF LASIK: Status: RESOLVED | Noted: 2021-01-28 | Resolved: 2022-06-06

## 2022-06-06 PROBLEM — Z34.90 PREGNANCY: Status: RESOLVED | Noted: 2022-06-06 | Resolved: 2022-06-06

## 2022-06-06 LAB
ANTIBODY SCREEN: NEGATIVE
APTT PPP: 24.8 SECONDS (ref 23.3–35.6)
BASOPHILS # BLD AUTO: 0.01 X10(3) UL (ref 0–0.2)
BASOPHILS NFR BLD AUTO: 0.1 %
DEPRECATED RDW RBC AUTO: 45.7 FL (ref 35.1–46.3)
EOSINOPHIL # BLD AUTO: 0.08 X10(3) UL (ref 0–0.7)
EOSINOPHIL NFR BLD AUTO: 0.8 %
ERYTHROCYTE [DISTWIDTH] IN BLOOD BY AUTOMATED COUNT: 14.1 % (ref 11–15)
FIBRINOGEN PPP-MCNC: 472 MG/DL (ref 180–480)
FSP PPP LA-ACNC: NEGATIVE MCG/ML
GLUCOSE BLDC GLUCOMTR-MCNC: 104 MG/DL (ref 70–99)
GLUCOSE BLDC GLUCOMTR-MCNC: 108 MG/DL (ref 70–99)
GLUCOSE BLDC GLUCOMTR-MCNC: 121 MG/DL (ref 70–99)
GLUCOSE BLDC GLUCOMTR-MCNC: 135 MG/DL (ref 70–99)
HCT VFR BLD AUTO: 33.8 %
HGB BLD-MCNC: 10.6 G/DL
HGB F MFR BLD KLEIH BETKE: <0.1 %
IMM GRANULOCYTES # BLD AUTO: 0.11 X10(3) UL (ref 0–1)
IMM GRANULOCYTES NFR BLD: 1.1 %
INR BLD: 0.95 (ref 0.8–1.2)
LYMPHOCYTES # BLD AUTO: 2.12 X10(3) UL (ref 1–4)
LYMPHOCYTES NFR BLD AUTO: 21.7 %
MCH RBC QN AUTO: 28 PG (ref 26–34)
MCHC RBC AUTO-ENTMCNC: 31.4 G/DL (ref 31–37)
MCV RBC AUTO: 89.4 FL
MONOCYTES # BLD AUTO: 0.75 X10(3) UL (ref 0.1–1)
MONOCYTES NFR BLD AUTO: 7.7 %
NEUTROPHILS # BLD AUTO: 6.7 X10 (3) UL (ref 1.5–7.7)
NEUTROPHILS # BLD AUTO: 6.7 X10(3) UL (ref 1.5–7.7)
NEUTROPHILS NFR BLD AUTO: 68.6 %
PLATELET # BLD AUTO: 214 10(3)UL (ref 150–450)
PROTHROMBIN TIME: 12.8 SECONDS (ref 11.6–14.8)
RBC # BLD AUTO: 3.78 X10(6)UL
RH BLOOD TYPE: POSITIVE
RUPTURE OF MEMBRANE (ROM): POSITIVE
SARS-COV-2 RNA RESP QL NAA+PROBE: NOT DETECTED
WBC # BLD AUTO: 9.8 X10(3) UL (ref 4–11)

## 2022-06-06 PROCEDURE — 99220 INITIAL OBSERVATION CARE,LEVL III: CPT | Performed by: OBSTETRICS & GYNECOLOGY

## 2022-06-06 PROCEDURE — 76815 OB US LIMITED FETUS(S): CPT | Performed by: OBSTETRICS & GYNECOLOGY

## 2022-06-06 PROCEDURE — 76811 OB US DETAILED SNGL FETUS: CPT | Performed by: OBSTETRICS & GYNECOLOGY

## 2022-06-06 RX ORDER — DOCUSATE SODIUM 100 MG/1
100 CAPSULE, LIQUID FILLED ORAL 2 TIMES DAILY
Status: DISCONTINUED | OUTPATIENT
Start: 2022-06-06 | End: 2022-06-07

## 2022-06-06 RX ORDER — CALCIUM CARBONATE 200(500)MG
1000 TABLET,CHEWABLE ORAL
Status: DISCONTINUED | OUTPATIENT
Start: 2022-06-06 | End: 2022-06-07

## 2022-06-06 NOTE — PROGRESS NOTES
RN at bedside since pt arrival to attempt to get FHTs. Fetal movement palpated and auscultated with doppler. No contractions per palpation.

## 2022-06-06 NOTE — PROGRESS NOTES
Pt back from [facility]. Tearful. Discussing options with . Denies any questions or concerns at this time.

## 2022-06-06 NOTE — PROGRESS NOTES
Sat down and spoke with pt and  after speaking with Dr Alex Mckinley. Twin B with PPROM. Reviewed options once again outline by Dr lAex Mckinley. Plan for adimssion and monitoring. Dr Grecia Ferguson to see them tomorrow and further discuss options. Pt and  unable to decide what they'd like to do at this time. All questions answered.

## 2022-06-06 NOTE — PROGRESS NOTES
I was called to bedside by RN to confirm viable twin gestation. Bedside US confirms viable twin gestation with fetal hear tones.   Ayanna Garland MD, MD  6/6/2022  1:41 AM  On call Laborist 06/06/22

## 2022-06-06 NOTE — PROGRESS NOTES
Dr. Mckayla Ayon (in house) at bedside and [address] performed. Visualized FHR for two fetuses. Dr. Lily Dorado made aware.

## 2022-06-06 NOTE — PROGRESS NOTES
Report received from Zuleyka Hanna RN. Pt resting in bed with SCDs on. Denies any questions or concerns at this time. Dr Lucian Ross to come perform sterile spec exam and waiting for MFM consult.

## 2022-06-06 NOTE — PROGRESS NOTES
Pt is a 33year old female admitted to TR2/TR2-A. Patient presents with:  R/o  Labor: Pt states she was in bed and turned and felt a \"gush of fluid\" pt noticed blood on her sheets. Pt denies contractions. Pt is  20w1d intra-uterine pregnancy. History obtained, consents signed. Oriented to room, staff, and plan of care.     Small amount of bright red blood noted on wyatt pad upon arrival to hospital.

## 2022-06-06 NOTE — PLAN OF CARE
Problem: ANTEPARTUM/LABOR and DELIVERY  Goal: Maintain pregnancy as long as maternal and/or fetal condition is stable  Description: INTERVENTIONS:  - Maternal surveillance  - Fetal surveillance  - Monitor uterine activity  - Medications as ordered  - Bedrest  Outcome: Progressing  Goal: Anxiety is at manageable level  Description: INTERVENTIONS:  - Kathleen patient to unit/surroundings  - Establish a trusting relationship with patient  - Discuss possible complications or alterations in birth plan  - Explain treatment plan  - Explain testing/procedures prior to initiation  - Encourage participation in care  - Encourage verbalization of concerns/fears  - Identify coping mechanisms  - Administer/offer alternative therapies (Aroma therapy, distraction, guided imagery, massage, music therapy, therapeutic touch)  - Manage patient's environment (Avoid overstimulation of patient)  Outcome: Progressing  Goal: Demonstrates ability to cope with hospitalization/illness  Description: INTERVENTIONS:  - Encourage verbalization of feelings/concerns/expectations  - Provide quiet environment  - Assist patient to identify own strengths and abilities  - Encourage patient to set small goals for self  - Encourage participation in diversional activity  - Reinforce positive adaptation of new coping behaviors  - Include patient/family/caregiver in decisions  Outcome: Progressing

## 2022-06-07 ENCOUNTER — TELEPHONE (OUTPATIENT)
Dept: OBGYN CLINIC | Facility: CLINIC | Age: 32
End: 2022-06-07

## 2022-06-07 VITALS
TEMPERATURE: 98 F | DIASTOLIC BLOOD PRESSURE: 57 MMHG | RESPIRATION RATE: 16 BRPM | BODY MASS INDEX: 44.83 KG/M2 | SYSTOLIC BLOOD PRESSURE: 127 MMHG | HEIGHT: 63 IN | HEART RATE: 96 BPM | WEIGHT: 253 LBS

## 2022-06-07 DIAGNOSIS — Z3A.20 20 WEEKS GESTATION OF PREGNANCY: ICD-10-CM

## 2022-06-07 DIAGNOSIS — O47.00 THREATENED PREMATURE LABOR, ANTEPARTUM: Primary | ICD-10-CM

## 2022-06-07 DIAGNOSIS — O30.049 DICHORIONIC DIAMNIOTIC TWIN PREGNANCY, ANTEPARTUM: ICD-10-CM

## 2022-06-07 PROBLEM — O42.919 PRETERM PREMATURE RUPTURE OF MEMBRANES (PPROM) WITH UNKNOWN ONSET OF LABOR (HCC): Status: ACTIVE | Noted: 2022-06-07

## 2022-06-07 PROBLEM — O42.919 PRETERM PREMATURE RUPTURE OF MEMBRANES (PPROM) WITH UNKNOWN ONSET OF LABOR: Status: ACTIVE | Noted: 2022-06-07

## 2022-06-07 LAB
BASOPHILS # BLD AUTO: 0.03 X10(3) UL (ref 0–0.2)
BASOPHILS NFR BLD AUTO: 0.3 %
DEPRECATED RDW RBC AUTO: 46.4 FL (ref 35.1–46.3)
EOSINOPHIL # BLD AUTO: 0.06 X10(3) UL (ref 0–0.7)
EOSINOPHIL NFR BLD AUTO: 0.7 %
ERYTHROCYTE [DISTWIDTH] IN BLOOD BY AUTOMATED COUNT: 14.3 % (ref 11–15)
GLUCOSE BLDC GLUCOMTR-MCNC: 57 MG/DL (ref 70–99)
GLUCOSE BLDC GLUCOMTR-MCNC: 67 MG/DL (ref 70–99)
GLUCOSE BLDC GLUCOMTR-MCNC: 78 MG/DL (ref 70–99)
HCT VFR BLD AUTO: 32.8 %
HGB BLD-MCNC: 10.7 G/DL
IMM GRANULOCYTES # BLD AUTO: 0.13 X10(3) UL (ref 0–1)
IMM GRANULOCYTES NFR BLD: 1.4 %
LYMPHOCYTES # BLD AUTO: 1.73 X10(3) UL (ref 1–4)
LYMPHOCYTES NFR BLD AUTO: 19.1 %
MCH RBC QN AUTO: 29.1 PG (ref 26–34)
MCHC RBC AUTO-ENTMCNC: 32.6 G/DL (ref 31–37)
MCV RBC AUTO: 89.1 FL
MONOCYTES # BLD AUTO: 0.71 X10(3) UL (ref 0.1–1)
MONOCYTES NFR BLD AUTO: 7.8 %
NEUTROPHILS # BLD AUTO: 6.39 X10 (3) UL (ref 1.5–7.7)
NEUTROPHILS # BLD AUTO: 6.39 X10(3) UL (ref 1.5–7.7)
NEUTROPHILS NFR BLD AUTO: 70.7 %
PLATELET # BLD AUTO: 208 10(3)UL (ref 150–450)
RBC # BLD AUTO: 3.68 X10(6)UL
WBC # BLD AUTO: 9.1 X10(3) UL (ref 4–11)

## 2022-06-07 PROCEDURE — 99226 SUBSEQUENT OBSERVATION CARE: CPT | Performed by: OBSTETRICS & GYNECOLOGY

## 2022-06-07 RX ORDER — DEXTROSE MONOHYDRATE 25 G/50ML
50 INJECTION, SOLUTION INTRAVENOUS
Status: DISCONTINUED | OUTPATIENT
Start: 2022-06-07 | End: 2022-06-07

## 2022-06-07 RX ORDER — NICOTINE POLACRILEX 4 MG
15 LOZENGE BUCCAL
Status: DISCONTINUED | OUTPATIENT
Start: 2022-06-07 | End: 2022-06-07

## 2022-06-07 RX ORDER — NICOTINE POLACRILEX 4 MG
30 LOZENGE BUCCAL
Status: DISCONTINUED | OUTPATIENT
Start: 2022-06-07 | End: 2022-06-07

## 2022-06-07 NOTE — TELEPHONE ENCOUNTER
Pt  is calling asking if you can fax records to the [facility] : [address] [facility] [address]  [phone]

## 2022-06-07 NOTE — TELEPHONE ENCOUNTER
Fax confirmation received    Spoke to [facility] aware clinicals have been faxed and that a fax confirmation has been received

## 2022-06-07 NOTE — TELEPHONE ENCOUNTER
Will route message to [facility] to please fax referral once approved to barb at [phone] pt has appt Thursday,06/09/2022 at 8am

## 2022-06-07 NOTE — TELEPHONE ENCOUNTER
Episode, [facility] admission including MFM consults sent to barb at number listed below    Awaiting fax confirmation

## 2022-06-07 NOTE — TELEPHONE ENCOUNTER
received back a call from [facility] states spoke to MD that do D&E will have to coordinate with [facility], was transferred and spoke to [facility] who stated will have to send a message to [facility] so they can call me back

## 2022-06-07 NOTE — TELEPHONE ENCOUNTER
Soni Lai been in touch with Kristie Cm the care coordinator at [facility]. They had a surgery opening on Friday morning. Pt has an appt for preop [facility] on Thursday [facility]. Kristie Cm states she needs the referral approval faxed to [phone] ASAP. Please call barb to ensure received the approval so there is no delay in her care.

## 2022-06-07 NOTE — TELEPHONE ENCOUNTER
Pt in the hospital with 20 weeks IVF twins. Baby B with previable PPROM. MFM has seen them in the hospital and they'd like termination.   [facility]

## 2022-06-07 NOTE — TELEPHONE ENCOUNTER
Spoke to [facility] aware if pt is transported via ambulance no referral is needed frm our end    Yeyo Velázquez currently has a red on transports is awaiting call to see if they can get her in. Gave him [facility] as option as well. Gave him contact number to Dr. Concepcion Bartholomew [phone] for a second option if they can get her in to [address]. Called [facility] and spoke to Anish Martin who stated will send RNs message regarding urgent D&E needed, provided all pt information to make chart. States will send message as high priority so they can call me back.

## 2022-06-07 NOTE — TELEPHONE ENCOUNTER
Received call from spouse regarding status on referral advised Manage Care department is working on referral now was initiated as Emergency/ Critical. Advised its pended and will fax directly to [facility] once approved and will also follow up with them once approved .  Verbalized understandings

## 2022-06-07 NOTE — TELEPHONE ENCOUNTER
[facility]LY, provided me with transport center line [phone] to [facility] so [address] [place] [address] can get a hold of of NALLELY LEHMAN so they can accept transfer    Sent to [address] [place] [address] via text message

## 2022-06-07 NOTE — PROGRESS NOTES
Spoke with patient and  at bedside regarding Share group and available support groups. Pt given folder with grief support information. Pt and  will contact RN if they need any additional support in the future.

## 2022-06-07 NOTE — PROGRESS NOTES
Pt ate remainder of lunch and drank 1 apple juice and 1 orange juice from unit. Pt POCT glucose  was 78 mg/dL.

## 2022-06-07 NOTE — PROGRESS NOTES
Pt 2 hour postprandial was 57 mg/dL. MD notified. Pt instructed to eat the remaining food she ordered for lunch and also given 1 apple juice and 1 orange juice from unit. Pt has no physical hypoglycemia symptoms.

## 2022-06-07 NOTE — TELEPHONE ENCOUNTER
Routed to [facility] referral department.

## 2022-06-08 ENCOUNTER — MED REC SCAN ONLY (OUTPATIENT)
Dept: OBGYN CLINIC | Facility: CLINIC | Age: 32
End: 2022-06-08

## 2022-06-08 ENCOUNTER — TELEPHONE (OUTPATIENT)
Dept: ENDOCRINOLOGY CLINIC | Facility: CLINIC | Age: 32
End: 2022-06-08

## 2022-06-08 ENCOUNTER — TELEPHONE (OUTPATIENT)
Dept: OBGYN CLINIC | Facility: CLINIC | Age: 32
End: 2022-06-08

## 2022-06-08 ENCOUNTER — PATIENT MESSAGE (OUTPATIENT)
Dept: ENDOCRINOLOGY CLINIC | Facility: CLINIC | Age: 32
End: 2022-06-08

## 2022-06-08 NOTE — TELEPHONE ENCOUNTER
Received call from  Freda Savage. [facility] has notified pt that referral for procedure is not complete and has missing information, so they will be required to self-pay $1000 tomorrow at consult. They have consult appt at 8am tomorrow. He states he is willing to pay out of pocket, but wants to make sure that referral will be approved to back-pay this expense and cover surgery. I advised Freda Savage that we will not be able to address this after-hours, will need to speak with referrals tomorrow AM. I apologized for stressful situation. He would like Mariann Huynh to call [place] tomorrow at [phone].  I will discuss with Mariann Huynh in AM.

## 2022-06-08 NOTE — TELEPHONE ENCOUNTER
Spoke to [place] Updated all information on referral    Contacted Candance at [facility] for an update and to advise of changes, LMTCB    Called barb advised that referral has been updated to include things listed below. States both her and MD advised pt if referral is not sent by today including approval they will have today out of pocket and they provided out of pocket price.

## 2022-06-08 NOTE — TELEPHONE ENCOUNTER
Received fax from [facility] at [facility] with clinical information attached, referral must refect auth for consult and procedure. As listed below including MD that will be doing procedure .         Faxed all information over to [facility] at [address] at [phone]    All clinical information sent to scanning

## 2022-06-08 NOTE — TELEPHONE ENCOUNTER
Pt's  - Barry Agosto states trying to cancel trip with [facility] d/t surgery his wife is having this Friday at [address] for twin termination - see previous Shalonda. States pt, himself and daughter were taking a trip to [place] 6/21-6/28 and in order to receive reimbursement needs a letter from her doctor. Barry Agosto asking to please states he needs to take care of pt after procedure so there are no issues for his ticket being reimbursed as well. Ok to send letter via [employer] and if needs signature from provider will let us know. Imformed will send request to [address] [place] [address] since he has been aware of what is happening with pt's situation.  is very appreciative.

## 2022-06-08 NOTE — TELEPHONE ENCOUNTER
Pt's  notified of ALFONZO's recs and read letter before sending it.  agrees with letter and is very appreciative.

## 2022-06-08 NOTE — TELEPHONE ENCOUNTER
Received call from [facility] stating approval for procedure. Will send copy to my email and I will fax to [facility]. Also states closed out referral for consult thinking it was a duplicate advised him ot will have consult prior to surgery. States will talk to his manager to see if he can just approve consult seeing that MD stated was ok to approve procedure and will call me back in 10mins. Spoke to barb at [facility] related message.  Provided me with Fax number [phone] to send over referrals

## 2022-06-08 NOTE — TELEPHONE ENCOUNTER
Receive call from Jeo Henderson at [facility] took all updated information on referral states will speak to  to see if OK to approve referral with reflected changes. Will call me back to let me know    Called barb related message.  verbalized understandings

## 2022-06-08 NOTE — TELEPHONE ENCOUNTER
Spoke to [employer] will be sending information to Manager to confirm ok to authorized, should get answer by today if not early morning tomorrow.     [facility] will update referral tab so it can reflect approval for D&E

## 2022-06-08 NOTE — TELEPHONE ENCOUNTER
Called [facility] at [phone] and spoke to [facility] who transferred me to [place] who advised me I will have to contact [facility] states have to call [phone] and press option 4, Eugenia transferred me over to [facility] and I spoke to [place] who transferred me to nursing staff. When transferred I spoke to [facility] CHAD AHUMADA who advised me to contact Candance since she is the one I talked to yesterday giving me verbal OK for referral.    Called Alyssa at [phone] who stated will have to talk to a manage for auth since she isnt part of the authorization department. States will call me or have someone call once they know who this has to get sent to to obtain authorization by today.      will route to LEATHA and Do Mac as LILLI

## 2022-06-08 NOTE — TELEPHONE ENCOUNTER
Received authorized referral for procedure faxed to [facility] at [phone]. Spoke to Izaiah Mejía from [facility] care she is working with Kelly Mejia to have consult approved.  Clinical information attached to referral

## 2022-06-08 NOTE — TELEPHONE ENCOUNTER
From: Enrique Floyd  To: EUSEBIO Carrizales  Sent: 6/8/2022 10:09 AM CDT  Subject: rohith Oneill,  I am not sure if Dr. General Galvin has reached out to Dr. Dinorah Mirza about my situation. I was admitted to the hospital this past Monday. I have learned that I am leaking amniotic fluid with one of the twins and am experiencing PPROM. Which entails risks and complications for myself and the babies. Unfortunately, My  and I have a difficult decision of terminating my pregnancy. My procedure is a two day process starting this Thursday and Friday at the [address] [place] [address]. Friday I will be having a D and E done. With me being on insulin what is my next steps for the insulin amount during the night? My D and E will be at 3:00 but have to be there at 1:30 on Friday so I know I won't be able to drink or eat after midnight . Does it affect the amount of insulin I take at night? What is the next steps after i am no longer pregnant in regards to taking insulin? I preferably would love to do the natural route.     Thank you,  Enrique Floyd

## 2022-06-08 NOTE — TELEPHONE ENCOUNTER
Called [facility] and spoke to [employer] who stated just received request on their end advised her this request was initiated as critical since yesterday, states this just was sent to them today at 1:50pm and if I wanted to I cant talk to [facility] their medical director. [facility] was going to transfer me then ended our call. A few seconds after received call from [facility] who stated medical Director is view referral for auth advised referral; was updated to reflect what [facility] needed. [facility] will let Medical Director named [facility] regarding update. Let her know that [facility] advised pt if Cali Chard is not obtained by 330pm to day she will have to pay out of pocket. [facility] will check in with them again prior to 3:30pm and let me know. Will also provide [facility] with my number so if approved can call me directly.

## 2022-06-08 NOTE — TELEPHONE ENCOUNTER
Spoke to [employer] new referral for consultation has to be initiated. And they will work on it ASAP. New referral initiated as critical for consult .

## 2022-06-08 NOTE — TELEPHONE ENCOUNTER
Pt  needs a note  To be put in my chart .  The note needs to say pt cant fly so they can get three money back on flights the booked before all this happened   Thought the 30th of June ,

## 2022-06-08 NOTE — TELEPHONE ENCOUNTER
Spoke to barb at [facility] advised her I spoke to [facility] regarding CPT codes that need to be in referral and they were going to be faxed, haven't received any information. States will e-mail [facility] so it can be faxed ASAP.

## 2022-06-08 NOTE — TELEPHONE ENCOUNTER
Received Call from [facility] from [facility] stating received my contact number from Leonardo Ruth advised that they were going to initiate Referral but was told we are working on it . States needs CPT code for procedure to be added on referral. Advised me she will fax over a letter including everything that has to be added on referral for consult and procedure.

## 2022-06-09 ENCOUNTER — PATIENT MESSAGE (OUTPATIENT)
Dept: OBGYN CLINIC | Facility: CLINIC | Age: 32
End: 2022-06-09

## 2022-06-09 ENCOUNTER — TELEPHONE (OUTPATIENT)
Dept: OBGYN CLINIC | Facility: CLINIC | Age: 32
End: 2022-06-09

## 2022-06-09 NOTE — TELEPHONE ENCOUNTER
Pt called and informed that ALFONZO will be out of office when pt is due for f/u appt. Pt requesting to see Berry Paez since he has been involved her case. Pt offered appt with TORRI on 6/27, pt accepts appt.

## 2022-06-09 NOTE — TELEPHONE ENCOUNTER
To TORRI to advise- how soon would you like pt to be seen after procedure at [facility]? Would you like pt to schedule with you or any provider?

## 2022-06-09 NOTE — TELEPHONE ENCOUNTER
Pt has an appt on 6/27 with [address] at [address]. An appt was already scheduled for the pt.

## 2022-06-09 NOTE — TELEPHONE ENCOUNTER
From: Eden Sweet  To: Emily Delgado DO  Sent: 6/9/2022 10:50 AM CDT  Subject: Post op    Hello,    When should I come back for a check up after my procedure tomorrow?     Eden Sweet

## 2022-06-09 NOTE — TELEPHONE ENCOUNTER
[facility]    Received auth for consult faxed to [address] [place] [address] at [phone] and received fax confirmation    Spoke to spouse advised faxed both referral to barb for consult and procedure. Spouse states the surgical referral is incomplete. Let him know not to pay any thing for procedure Camacho from [facility] sent a fax with everything that needed to be on referral for surgical procedure as listed below and surgical referral approved by [facility] reflected just that. Verbalized understandings.

## 2022-06-16 ENCOUNTER — PATIENT MESSAGE (OUTPATIENT)
Dept: ENDOCRINOLOGY CLINIC | Facility: CLINIC | Age: 32
End: 2022-06-16

## 2022-06-16 DIAGNOSIS — E11.9 DIET-CONTROLLED DIABETES MELLITUS (HCC): ICD-10-CM

## 2022-06-16 DIAGNOSIS — O24.319 PREGESTATIONAL DIABETES MELLITUS, MODIFIED WHITE CLASS B: ICD-10-CM

## 2022-06-16 RX ORDER — BLOOD SUGAR DIAGNOSTIC
1 STRIP MISCELLANEOUS 4 TIMES DAILY
Qty: 150 STRIP | Refills: 3 | Status: SHIPPED | OUTPATIENT
Start: 2022-06-16 | End: 2022-10-14

## 2022-06-16 RX ORDER — LANCETS 30 GAUGE
4 EACH MISCELLANEOUS 4 TIMES DAILY
Qty: 100 EACH | Refills: 3 | Status: SHIPPED | OUTPATIENT
Start: 2022-06-16 | End: 2022-10-14

## 2022-06-16 NOTE — TELEPHONE ENCOUNTER
Mai,    Pended prescription for test strips and lancets.  Please advise if you would us to call pharmacy to cx insulin or just have pt cancel/deny refill/

## 2022-06-27 ENCOUNTER — TELEPHONE (OUTPATIENT)
Dept: OBGYN CLINIC | Facility: CLINIC | Age: 32
End: 2022-06-27

## 2022-06-27 ENCOUNTER — PATIENT MESSAGE (OUTPATIENT)
Dept: INTERNAL MEDICINE CLINIC | Facility: CLINIC | Age: 32
End: 2022-06-27

## 2022-06-27 ENCOUNTER — OFFICE VISIT (OUTPATIENT)
Dept: OBGYN CLINIC | Facility: CLINIC | Age: 32
End: 2022-06-27
Payer: COMMERCIAL

## 2022-06-27 VITALS
HEART RATE: 71 BPM | BODY MASS INDEX: 42 KG/M2 | WEIGHT: 235 LBS | SYSTOLIC BLOOD PRESSURE: 129 MMHG | DIASTOLIC BLOOD PRESSURE: 84 MMHG

## 2022-06-27 DIAGNOSIS — Z87.59 HISTORY OF PRETERM PREMATURE RUPTURE OF MEMBRANES (PPROM): Primary | ICD-10-CM

## 2022-06-27 DIAGNOSIS — N97.9 FEMALE INFERTILITY: Primary | ICD-10-CM

## 2022-06-27 PROCEDURE — 99213 OFFICE O/P EST LOW 20 MIN: CPT | Performed by: OBSTETRICS & GYNECOLOGY

## 2022-06-27 PROCEDURE — 3079F DIAST BP 80-89 MM HG: CPT | Performed by: OBSTETRICS & GYNECOLOGY

## 2022-06-27 PROCEDURE — 3074F SYST BP LT 130 MM HG: CPT | Performed by: OBSTETRICS & GYNECOLOGY

## 2022-06-27 NOTE — TELEPHONE ENCOUNTER
Referral initiated, mychart sent, provided Manage Care number if has any questions on referral is to call [phone].

## 2022-06-27 NOTE — TELEPHONE ENCOUNTER
Last seen in February 2021. Instruct  to schedule an appointment to see me for her physical.  Okay to use res 24. I can review her vaccination history do titers and then decide about vaccines.   Thank you

## 2022-07-15 ENCOUNTER — PATIENT MESSAGE (OUTPATIENT)
Facility: CLINIC | Age: 32
End: 2022-07-15

## 2022-07-15 NOTE — TELEPHONE ENCOUNTER
Dr. Andrew Cooper, for Dr. Malini Arambula, please review and advise. Thanks.     Future Appointments   Date Time Provider Jessica Miller   8/11/2022  3:00 PM Selina Johnston MD [facility]   8/23/2022  9:15 AM Katherine Callejas MD [facility]

## 2022-07-16 NOTE — TELEPHONE ENCOUNTER
Patient (name and  verified) calling to request the Sri Leighton Booster vaccine. Assisted patient with appt. Also suggested to reach out to the local pharmacies in the area as patient would like to complete the vaccine today as she is going on vacation next week.      Future Appointments   Date Time Provider Jessica Angela   2022 10:00 AM [facility] [place] RN [facility]   2022  3:00 PM Dar Campos MD [address] [place] [address]   2022  9:15 AM Sangeetha Ibanez MD [facility]

## 2022-07-25 ENCOUNTER — NURSE ONLY (OUTPATIENT)
Dept: INTERNAL MEDICINE CLINIC | Facility: CLINIC | Age: 32
End: 2022-07-25
Payer: COMMERCIAL

## 2022-07-25 DIAGNOSIS — Z00.00 PREVENTATIVE HEALTH CARE: Primary | ICD-10-CM

## 2022-07-25 PROCEDURE — 90707 MMR VACCINE SC: CPT | Performed by: INTERNAL MEDICINE

## 2022-07-25 PROCEDURE — 90471 IMMUNIZATION ADMIN: CPT | Performed by: INTERNAL MEDICINE

## 2022-07-25 NOTE — PROGRESS NOTES
Patient came in for nurse visit MMR. I verified orders and name/. Pt was administered MMR on left upper arm subcutaneous. Pt tolerated injection, no reactions noted.

## 2022-08-01 ENCOUNTER — PATIENT MESSAGE (OUTPATIENT)
Dept: OBGYN CLINIC | Facility: CLINIC | Age: 32
End: 2022-08-01

## 2022-11-28 ENCOUNTER — PATIENT MESSAGE (OUTPATIENT)
Dept: OPHTHALMOLOGY | Facility: CLINIC | Age: 32
End: 2022-11-28

## 2022-11-28 ENCOUNTER — TELEMEDICINE (OUTPATIENT)
Dept: INTERNAL MEDICINE CLINIC | Facility: CLINIC | Age: 32
End: 2022-11-28
Payer: COMMERCIAL

## 2022-11-28 DIAGNOSIS — Z00.00 ADULT GENERAL MEDICAL EXAM: ICD-10-CM

## 2022-11-28 DIAGNOSIS — J06.9 URTI (ACUTE UPPER RESPIRATORY INFECTION): Primary | ICD-10-CM

## 2022-11-28 DIAGNOSIS — O24.319 PREGESTATIONAL DIABETES MELLITUS, MODIFIED WHITE CLASS B: ICD-10-CM

## 2022-11-28 RX ORDER — FLUTICASONE PROPIONATE 50 MCG
2 SPRAY, SUSPENSION (ML) NASAL DAILY
Qty: 1 EACH | Refills: 0 | Status: SHIPPED | OUTPATIENT
Start: 2022-11-28 | End: 2023-11-23

## 2022-11-28 NOTE — TELEPHONE ENCOUNTER
From: Tati Issa  To: Rosario Martínez MD  Sent: 11/28/2022 12:56 PM CST  Subject: Appointment    Hello,  What are the next appointments available?

## 2022-11-30 ENCOUNTER — TELEPHONE (OUTPATIENT)
Dept: INTERNAL MEDICINE CLINIC | Facility: CLINIC | Age: 32
End: 2022-11-30

## 2022-11-30 RX ORDER — AMOXICILLIN AND CLAVULANATE POTASSIUM 875; 125 MG/1; MG/1
1 TABLET, FILM COATED ORAL 2 TIMES DAILY
Qty: 14 TABLET | Refills: 0 | Status: SHIPPED | OUTPATIENT
Start: 2022-11-30 | End: 2022-12-07

## 2022-11-30 NOTE — TELEPHONE ENCOUNTER
I have sent Augmentin to the pharmacy. Please let her continue with steam inhalation, use nasal spray. I am hoping that she will get better soon.

## 2022-11-30 NOTE — TELEPHONE ENCOUNTER
Pt was seen for virtual visit on 11/28/22 upper respiratory viral issues. Pt states ears are still stuffy, underneath eye near cheek her right sinus area is now tender the touch. Would like to know if something can be prescribed for sinus issues.

## 2022-12-08 ENCOUNTER — PATIENT MESSAGE (OUTPATIENT)
Facility: CLINIC | Age: 32
End: 2022-12-08

## 2022-12-09 NOTE — TELEPHONE ENCOUNTER
From: Elva Vázquez  To: Vanessa Matos MD  Sent: 12/8/2022 12:15 PM CST  Subject: Kenneth Morales,    Is it normal to still have my left ear feeling stuffy after finishing amoxicillin this past Tuesday? It like plugs and unplugs.

## 2022-12-28 ENCOUNTER — OFFICE VISIT (OUTPATIENT)
Dept: OPHTHALMOLOGY | Facility: CLINIC | Age: 32
End: 2022-12-28
Payer: COMMERCIAL

## 2022-12-28 DIAGNOSIS — Z98.890 HX OF LASIK: ICD-10-CM

## 2022-12-28 DIAGNOSIS — E11.9 DIET-CONTROLLED DIABETES MELLITUS (HCC): Primary | ICD-10-CM

## 2022-12-28 PROCEDURE — 92014 COMPRE OPH EXAM EST PT 1/>: CPT | Performed by: OPHTHALMOLOGY

## 2022-12-28 NOTE — PATIENT INSTRUCTIONS
Diet-controlled diabetes mellitus ([facility] [address]  Diet controlled diabetes: no background of retinopathy, no signs of neovascularization noted. Discussed ocular and systemic benefits of blood sugar control. Diagnosis and treatment discussed in detail with patient. Will see patient in 2-3 years for a diabetic exam    Hx of LASIK  Patient had LASIK in both eyes in 2011 and is seeing well without glasses.

## 2022-12-28 NOTE — ASSESSMENT & PLAN NOTE
Diet controlled diabetes: no background of retinopathy, no signs of neovascularization noted. Discussed ocular and systemic benefits of blood sugar control. Diagnosis and treatment discussed in detail with patient.     Will see patient in 2-3 years for a diabetic exam

## 2023-01-20 ENCOUNTER — LAB ENCOUNTER (OUTPATIENT)
Dept: LAB | Facility: HOSPITAL | Age: 33
End: 2023-01-20
Attending: INTERNAL MEDICINE
Payer: COMMERCIAL

## 2023-01-20 ENCOUNTER — PATIENT MESSAGE (OUTPATIENT)
Facility: CLINIC | Age: 33
End: 2023-01-20

## 2023-01-20 DIAGNOSIS — Z00.00 ADULT GENERAL MEDICAL EXAM: ICD-10-CM

## 2023-01-20 DIAGNOSIS — N97.9 FEMALE INFERTILITY: Primary | ICD-10-CM

## 2023-01-20 LAB
ALBUMIN SERPL-MCNC: 3.9 G/DL (ref 3.4–5)
ALBUMIN/GLOB SERPL: 1 {RATIO} (ref 1–2)
ALP LIVER SERPL-CCNC: 50 U/L
ALT SERPL-CCNC: 18 U/L
ANION GAP SERPL CALC-SCNC: 7 MMOL/L (ref 0–18)
AST SERPL-CCNC: 8 U/L (ref 15–37)
BILIRUB SERPL-MCNC: 0.3 MG/DL (ref 0.1–2)
BUN BLD-MCNC: 13 MG/DL (ref 7–18)
BUN/CREAT SERPL: 17.1 (ref 10–20)
CALCIUM BLD-MCNC: 9.7 MG/DL (ref 8.5–10.1)
CHLORIDE SERPL-SCNC: 107 MMOL/L (ref 98–112)
CHOLEST SERPL-MCNC: 154 MG/DL (ref ?–200)
CO2 SERPL-SCNC: 24 MMOL/L (ref 21–32)
CREAT BLD-MCNC: 0.76 MG/DL
CREAT UR-SCNC: 128 MG/DL
DEPRECATED RDW RBC AUTO: 45.5 FL (ref 35.1–46.3)
ERYTHROCYTE [DISTWIDTH] IN BLOOD BY AUTOMATED COUNT: 14.1 % (ref 11–15)
EST. AVERAGE GLUCOSE BLD GHB EST-MCNC: 160 MG/DL (ref 68–126)
FASTING PATIENT LIPID ANSWER: YES
FASTING STATUS PATIENT QL REPORTED: YES
GFR SERPLBLD BASED ON 1.73 SQ M-ARVRAT: 105 ML/MIN/1.73M2 (ref 60–?)
GLOBULIN PLAS-MCNC: 3.9 G/DL (ref 2.8–4.4)
GLUCOSE BLD-MCNC: 165 MG/DL (ref 70–99)
HBA1C MFR BLD: 7.2 % (ref ?–5.7)
HCT VFR BLD AUTO: 41.5 %
HDLC SERPL-MCNC: 53 MG/DL (ref 40–59)
HGB BLD-MCNC: 13.3 G/DL
LDLC SERPL CALC-MCNC: 70 MG/DL (ref ?–100)
MCH RBC QN AUTO: 28.2 PG (ref 26–34)
MCHC RBC AUTO-ENTMCNC: 32 G/DL (ref 31–37)
MCV RBC AUTO: 88.1 FL
MICROALBUMIN UR-MCNC: 11.4 MG/DL
MICROALBUMIN/CREAT 24H UR-RTO: 89.1 UG/MG (ref ?–30)
NONHDLC SERPL-MCNC: 101 MG/DL (ref ?–130)
OSMOLALITY SERPL CALC.SUM OF ELEC: 290 MOSM/KG (ref 275–295)
PLATELET # BLD AUTO: 242 10(3)UL (ref 150–450)
POTASSIUM SERPL-SCNC: 4.4 MMOL/L (ref 3.5–5.1)
PROT SERPL-MCNC: 7.8 G/DL (ref 6.4–8.2)
RBC # BLD AUTO: 4.71 X10(6)UL
SODIUM SERPL-SCNC: 138 MMOL/L (ref 136–145)
TRIGL SERPL-MCNC: 183 MG/DL (ref 30–149)
TSI SER-ACNC: 1.66 MIU/ML (ref 0.36–3.74)
VLDLC SERPL CALC-MCNC: 28 MG/DL (ref 0–30)
WBC # BLD AUTO: 7.4 X10(3) UL (ref 4–11)

## 2023-01-20 PROCEDURE — 83036 HEMOGLOBIN GLYCOSYLATED A1C: CPT

## 2023-01-20 PROCEDURE — 80053 COMPREHEN METABOLIC PANEL: CPT

## 2023-01-20 PROCEDURE — 82570 ASSAY OF URINE CREATININE: CPT

## 2023-01-20 PROCEDURE — 80061 LIPID PANEL: CPT

## 2023-01-20 PROCEDURE — 85027 COMPLETE CBC AUTOMATED: CPT

## 2023-01-20 PROCEDURE — 3051F HG A1C>EQUAL 7.0%<8.0%: CPT | Performed by: INTERNAL MEDICINE

## 2023-01-20 PROCEDURE — 84443 ASSAY THYROID STIM HORMONE: CPT

## 2023-01-20 PROCEDURE — 3061F NEG MICROALBUMINURIA REV: CPT | Performed by: INTERNAL MEDICINE

## 2023-01-20 PROCEDURE — 36415 COLL VENOUS BLD VENIPUNCTURE: CPT

## 2023-01-20 PROCEDURE — 82043 UR ALBUMIN QUANTITATIVE: CPT

## 2023-01-20 PROCEDURE — 3060F POS MICROALBUMINURIA REV: CPT | Performed by: INTERNAL MEDICINE

## 2023-01-20 NOTE — TELEPHONE ENCOUNTER
From: Charmaine Schirmer  To: Patricia Garcia MD  Sent: 2023 12:04 PM CST  Subject: Glucose    Hi Dr. Hill Connolly,   This is regarding my glucose result. This past  I had to have a medical termination at 20 weeks gestation. I was expecting twins and went into  labor and PPROM. During that time I had to take insulin. I have an appointment with you this month but I also want to address the fact I am starting my last process of IVF. I start next week with my medicine for my embryo transfer. At this time I would like to manage my glucose without insulin. Is it necessary if I end up pregnant after my embryo transfer that I would have to go on insulin? Is it possible to manage my hopefully soon to be pregnancy without insulin? If not do you specialize in it?

## 2023-01-23 ENCOUNTER — PATIENT MESSAGE (OUTPATIENT)
Dept: ENDOCRINOLOGY CLINIC | Facility: CLINIC | Age: 33
End: 2023-01-23

## 2023-01-23 ENCOUNTER — PATIENT MESSAGE (OUTPATIENT)
Facility: CLINIC | Age: 33
End: 2023-01-23

## 2023-01-23 NOTE — TELEPHONE ENCOUNTER
Pt called back to sched appt for Monday 1/30/23 in [facility].  Pt will work on getting HMO Referral.

## 2023-01-23 NOTE — TELEPHONE ENCOUNTER
Please ask if she would be willing to see me in [facility] tomorrow or next week Tuesday? If not will likely have her come with CDE this week to resume insulin given plans for embryo transfer and then find appt with me in the next 102 weeks.

## 2023-01-23 NOTE — TELEPHONE ENCOUNTER
I think it is better for her to be seen by endocrinology.   Please provide her a referral and empirically request to be seen by endocrinologist.  Thank you

## 2023-01-24 NOTE — TELEPHONE ENCOUNTER
From: Flako Griffith  To: Ivan Sandoval MD  Sent: 1/23/2023 3:36 PM CST  Subject: Referral     Hello,  This is regarding the last message that Dr. Hiwot Patel wants me to go see an endocrinologist. I have scheduled one with Aaron Boxer Neely/Dixie David for Monday January 30 at 12:30. If you can please send a referral for me Luci Obregon been to her before.  Thank you so much!!

## 2023-01-26 NOTE — TELEPHONE ENCOUNTER
Message to surgery coordinator normal... overweight AA Female, alert, no respiratory discomfort. overweight AA Female, alert, no respiratory discomfort.  Non-toxic.

## 2023-01-30 ENCOUNTER — OFFICE VISIT (OUTPATIENT)
Dept: ENDOCRINOLOGY CLINIC | Facility: CLINIC | Age: 33
End: 2023-01-30

## 2023-01-30 DIAGNOSIS — E11.65 UNCONTROLLED TYPE 2 DIABETES MELLITUS WITH HYPERGLYCEMIA (HCC): Primary | ICD-10-CM

## 2023-01-30 PROCEDURE — 99214 OFFICE O/P EST MOD 30 MIN: CPT

## 2023-01-30 PROCEDURE — 3079F DIAST BP 80-89 MM HG: CPT

## 2023-01-30 PROCEDURE — 3074F SYST BP LT 130 MM HG: CPT

## 2023-01-30 RX ORDER — PEN NEEDLE, DIABETIC 32GX 5/32"
NEEDLE, DISPOSABLE MISCELLANEOUS
Qty: 100 EACH | Refills: 3 | Status: SHIPPED | OUTPATIENT
Start: 2023-01-30

## 2023-01-30 NOTE — PATIENT INSTRUCTIONS
Start NPH 14 units subcutaneous daily at bedtime     Start checking sugars at least twice daily- fasting and 2 hours post prandially alternating meals     Plan to send me your readings via [facility] on 2/3/2023    Schedule 1 month follow up with me or with Dr. Tori Oliver

## 2023-01-31 ENCOUNTER — OFFICE VISIT (OUTPATIENT)
Facility: CLINIC | Age: 33
End: 2023-01-31

## 2023-01-31 VITALS
SYSTOLIC BLOOD PRESSURE: 120 MMHG | RESPIRATION RATE: 14 BRPM | DIASTOLIC BLOOD PRESSURE: 68 MMHG | OXYGEN SATURATION: 98 % | WEIGHT: 231 LBS | HEIGHT: 63 IN | BODY MASS INDEX: 40.93 KG/M2 | HEART RATE: 96 BPM

## 2023-01-31 DIAGNOSIS — Z00.00 ADULT GENERAL MEDICAL EXAM: Primary | ICD-10-CM

## 2023-01-31 PROCEDURE — 3074F SYST BP LT 130 MM HG: CPT | Performed by: INTERNAL MEDICINE

## 2023-01-31 PROCEDURE — 3078F DIAST BP <80 MM HG: CPT | Performed by: INTERNAL MEDICINE

## 2023-01-31 PROCEDURE — 3008F BODY MASS INDEX DOCD: CPT | Performed by: INTERNAL MEDICINE

## 2023-01-31 PROCEDURE — 99395 PREV VISIT EST AGE 18-39: CPT | Performed by: INTERNAL MEDICINE

## 2023-02-02 VITALS — SYSTOLIC BLOOD PRESSURE: 129 MMHG | WEIGHT: 229 LBS | DIASTOLIC BLOOD PRESSURE: 89 MMHG | BODY MASS INDEX: 41 KG/M2

## 2023-02-03 ENCOUNTER — PATIENT MESSAGE (OUTPATIENT)
Dept: ENDOCRINOLOGY CLINIC | Facility: CLINIC | Age: 33
End: 2023-02-03

## 2023-02-03 RX ORDER — INSULIN ASPART 100 [IU]/ML
6 INJECTION, SOLUTION INTRAVENOUS; SUBCUTANEOUS 3 TIMES DAILY
Qty: 15 ML | Refills: 3 | Status: SHIPPED | OUTPATIENT
Start: 2023-02-03

## 2023-02-03 RX ORDER — BLOOD SUGAR DIAGNOSTIC
STRIP MISCELLANEOUS
Qty: 200 STRIP | Refills: 3 | Status: SHIPPED | OUTPATIENT
Start: 2023-02-03

## 2023-02-03 RX ORDER — INSULIN HUMAN 100 [IU]/ML
20 INJECTION, SUSPENSION SUBCUTANEOUS DAILY
Qty: 15 ML | Refills: 2 | Status: SHIPPED | OUTPATIENT
Start: 2023-02-03

## 2023-02-03 RX ORDER — LANCETS 33 GAUGE
EACH MISCELLANEOUS
Qty: 200 EACH | Refills: 3 | Status: SHIPPED | OUTPATIENT
Start: 2023-02-03

## 2023-02-10 ENCOUNTER — PATIENT MESSAGE (OUTPATIENT)
Dept: ENDOCRINOLOGY CLINIC | Facility: CLINIC | Age: 33
End: 2023-02-10

## 2023-02-10 NOTE — TELEPHONE ENCOUNTER
Messaged pt that her message has been received and routed to provider. Should hear back from our office within a few days. BRANDIN Branch-- see pt glucose numbers as listed below.

## 2023-02-15 ENCOUNTER — TELEPHONE (OUTPATIENT)
Dept: ENDOCRINOLOGY CLINIC | Facility: CLINIC | Age: 33
End: 2023-02-15

## 2023-02-15 NOTE — TELEPHONE ENCOUNTER
Humulin N kwikpen 100 unit/ML subcutaneous Suspension pen injector     Reason for request:   SCRIPT CLARIFICATION. Reason for request:   SCRIPT CLARIFICATION. DID YOU SEE OUR FAX???? NOT COVERED!

## 2023-03-21 ENCOUNTER — PATIENT MESSAGE (OUTPATIENT)
Dept: ENDOCRINOLOGY CLINIC | Facility: CLINIC | Age: 33
End: 2023-03-21

## 2023-03-21 NOTE — TELEPHONE ENCOUNTER
From: Darvin Go  To:  EUSEBIO Krueger  Sent: 3/21/2023 8:15 AM CDT  Subject: Terra Abbott,  My fertility doctor is having me start my meds today now

## 2023-03-29 ENCOUNTER — PATIENT MESSAGE (OUTPATIENT)
Dept: ENDOCRINOLOGY CLINIC | Facility: CLINIC | Age: 33
End: 2023-03-29

## 2023-03-29 NOTE — TELEPHONE ENCOUNTER
From: Eden Sweet  To: EUSEBIO Nelson  Sent: 3/29/2023 8:29 AM CDT  Subject: Insulin    Hello good morning,  Can you please send a new request with my new insulin intake of novolog so I can get my prescription sooner to my pharmacy. Thank you!

## 2023-03-31 ENCOUNTER — PATIENT MESSAGE (OUTPATIENT)
Dept: ENDOCRINOLOGY CLINIC | Facility: CLINIC | Age: 33
End: 2023-03-31

## 2023-03-31 NOTE — TELEPHONE ENCOUNTER
From: Leonardo Ruth  To:  EUSEBIO Quevedo  Sent: 3/31/2023 10:44 AM CDT  Subject: Glucose update    Hello,  Here are my number of units daily: B 14,L18,D18,N40.  3/17  B-112  L-111    3/18  L-79  D-120    3/19  B-112  L-70    3/20  F-87  L-78    3/21  B-108  D-86    3/22  F-119  L-116    3/23  L-115  D-140    3/24  B-110  L-98    3/25  F-82  L-116    3/26  L-103  D-125    3/27  F-99  B-104    3/28  L-90  D-135    3/29  B-110  L-115

## 2023-04-01 RX ORDER — INSULIN ASPART 100 [IU]/ML
INJECTION, SOLUTION INTRAVENOUS; SUBCUTANEOUS
Qty: 48 ML | Refills: 0 | Status: SHIPPED | OUTPATIENT
Start: 2023-04-01

## 2023-04-01 NOTE — TELEPHONE ENCOUNTER
Per Joshua 3/31/23: Let's keep the doses the same but just a small change with dinner to 20 units    Dose updated and sent to pharmacy.

## 2023-04-11 RX ORDER — PEN NEEDLE, DIABETIC 32GX 5/32"
NEEDLE, DISPOSABLE MISCELLANEOUS
Qty: 100 EACH | Refills: 3 | Status: SHIPPED | OUTPATIENT
Start: 2023-04-11

## 2023-04-17 ENCOUNTER — PATIENT MESSAGE (OUTPATIENT)
Facility: CLINIC | Age: 33
End: 2023-04-17

## 2023-05-09 ENCOUNTER — PATIENT MESSAGE (OUTPATIENT)
Dept: ENDOCRINOLOGY CLINIC | Facility: CLINIC | Age: 33
End: 2023-05-09

## 2023-05-09 NOTE — TELEPHONE ENCOUNTER
From: Reji Baez  To: EUSEBIO Parry  Sent: 5/9/2023 1:04 PM CDT  Subject: Update    Hello,  I have an appointment with you tomorrow. I figured to update you with my numbers. I have also started taking my fertility medicine Saturday.  I am also taking apple cider vinegar with a half glass of water before bed.    4/27  Fasting-74  B-115    4/28  L-120  D-115    4/29  F-115  B-110    4/30  B-107  L-95    5/1  F-87  D-68    5/2  B-113  D-105    5/3  F-102  L-74 at 11:54 then dropped to 48 checked 1:35    5/4  F-104  L-79    5/5  B-105  D-125    5/6-started IVF meds  F-105  B-102    5/7  F-137  D-126    5/8  B-106  L-81    5/9  B-106

## 2023-05-09 NOTE — TELEPHONE ENCOUNTER
From: Haylie Leiva  To: EUSEBIO Gonsalez  Sent: 5/9/2023 1:15 PM CDT  Subject: Lancets    Hello,  Can you send a new prescription for my lancets. The wrong ones were sent last time. I have OneTouch Verio Flex not [employer]. Thank you!

## 2023-05-09 NOTE — TELEPHONE ENCOUNTER
Responded to pt as pt has One Touch Verio System and One Touch Delica Lancets should work with device.  Advised to bring to appt tomorrow if there are issues

## 2023-05-10 ENCOUNTER — OFFICE VISIT (OUTPATIENT)
Dept: ENDOCRINOLOGY CLINIC | Facility: CLINIC | Age: 33
End: 2023-05-10

## 2023-05-10 VITALS
HEART RATE: 76 BPM | BODY MASS INDEX: 42 KG/M2 | DIASTOLIC BLOOD PRESSURE: 72 MMHG | WEIGHT: 239 LBS | SYSTOLIC BLOOD PRESSURE: 124 MMHG

## 2023-05-10 DIAGNOSIS — E11.65 UNCONTROLLED TYPE 2 DIABETES MELLITUS WITH HYPERGLYCEMIA (HCC): Primary | ICD-10-CM

## 2023-05-10 LAB
CARTRIDGE LOT#: NORMAL NUMERIC
GLUCOSE BLOOD: 90
HEMOGLOBIN A1C: 5.6 % (ref 4.3–5.6)
TEST STRIP LOT #: NORMAL NUMERIC

## 2023-05-10 PROCEDURE — 99214 OFFICE O/P EST MOD 30 MIN: CPT

## 2023-05-10 PROCEDURE — 3044F HG A1C LEVEL LT 7.0%: CPT

## 2023-05-10 PROCEDURE — 3078F DIAST BP <80 MM HG: CPT

## 2023-05-10 PROCEDURE — 82947 ASSAY GLUCOSE BLOOD QUANT: CPT

## 2023-05-10 PROCEDURE — 83036 HEMOGLOBIN GLYCOSYLATED A1C: CPT

## 2023-05-10 PROCEDURE — 3074F SYST BP LT 130 MM HG: CPT

## 2023-05-10 RX ORDER — INSULIN DETEMIR 100 [IU]/ML
48 INJECTION, SOLUTION SUBCUTANEOUS NIGHTLY
Qty: 30 ML | Refills: 1 | Status: SHIPPED | OUTPATIENT
Start: 2023-05-10

## 2023-05-10 NOTE — PATIENT INSTRUCTIONS
Continue NPH- 48 units subcutaneous daily     Send me a message when you are on your last pen so we can transition to Levemir- this has been sent to pharmacy     Change Novolog to 14-14-20 with meals       Glucose logs in 2 weeks     Return for visit in August.

## 2023-05-15 ENCOUNTER — PATIENT MESSAGE (OUTPATIENT)
Dept: ENDOCRINOLOGY CLINIC | Facility: CLINIC | Age: 33
End: 2023-05-15

## 2023-05-22 RX ORDER — INSULIN ASPART 100 [IU]/ML
INJECTION, SOLUTION INTRAVENOUS; SUBCUTANEOUS
Qty: 48 ML | Refills: 0 | Status: SHIPPED | OUTPATIENT
Start: 2023-05-22

## 2023-06-05 NOTE — TELEPHONE ENCOUNTER
LOV: 5/10/2023    RTC: 3 Months    FU: 8/11/2023    Last Refill: 5/22/2023    3 Month Supply Pending

## 2023-06-06 RX ORDER — INSULIN ASPART 100 [IU]/ML
INJECTION, SOLUTION INTRAVENOUS; SUBCUTANEOUS
Qty: 48 ML | Refills: 0 | Status: SHIPPED | OUTPATIENT
Start: 2023-06-06

## 2023-06-06 RX ORDER — PEN NEEDLE, DIABETIC 32GX 5/32"
NEEDLE, DISPOSABLE MISCELLANEOUS
Qty: 100 EACH | Refills: 3 | Status: SHIPPED | OUTPATIENT
Start: 2023-06-06

## 2023-07-06 ENCOUNTER — TELEPHONE (OUTPATIENT)
Dept: ENDOCRINOLOGY CLINIC | Facility: CLINIC | Age: 33
End: 2023-07-06

## 2023-07-06 RX ORDER — PEN NEEDLE, DIABETIC 32GX 5/32"
NEEDLE, DISPOSABLE MISCELLANEOUS
Qty: 300 EACH | Refills: 0 | Status: SHIPPED | OUTPATIENT
Start: 2023-07-06

## 2023-07-12 ENCOUNTER — PATIENT MESSAGE (OUTPATIENT)
Dept: ENDOCRINOLOGY CLINIC | Facility: CLINIC | Age: 33
End: 2023-07-12

## 2023-07-12 NOTE — TELEPHONE ENCOUNTER
Replied to patient.      Levemir- Increase to 50    Novolog- Continue 14 units with breakfast and lunch and increase to 22 units with dinner

## 2023-07-12 NOTE — TELEPHONE ENCOUNTER
From: Ana Wheeler  To: Terra Guzmán, APRN  Sent: 7/12/2023 10:28 AM CDT  Subject: Glucose    Hello,  Here are my numbers. I started my levimir for the first time on June 25. B/L-14 units, D-20 units, Lev-46 units.     6/21  B-97  L-104    6/22  B-78  D-110    6/23  F-106  B-117    6/24  B-119  D-120    6/25  L-110  D-114    6/26  F-140  B-120    6/27  F-128  B-120    6/28  B-119  L-100    6/29  L-110  D-116    6/30  L-95  D-121    7/1  F-144  L-104    7/2  B-125  L-115    7/3  F-148  D-115    7/4  F-123  L-100    7/5  L-110  D-125    7/6  F-137  L-93    7/7  L-115  D-120    7/8  B-125  L-110    7/9  B-119  D-130    7/10  L-131  D-120    7/11  F-130  B-121    7/12  F-142

## 2023-08-11 ENCOUNTER — OFFICE VISIT (OUTPATIENT)
Dept: ENDOCRINOLOGY CLINIC | Facility: CLINIC | Age: 33
End: 2023-08-11

## 2023-08-11 VITALS
HEIGHT: 62.99 IN | WEIGHT: 246 LBS | SYSTOLIC BLOOD PRESSURE: 118 MMHG | HEART RATE: 87 BPM | BODY MASS INDEX: 43.59 KG/M2 | DIASTOLIC BLOOD PRESSURE: 61 MMHG

## 2023-08-11 DIAGNOSIS — Z79.4 CONTROLLED TYPE 2 DIABETES MELLITUS WITHOUT COMPLICATION, WITH LONG-TERM CURRENT USE OF INSULIN (HCC): Primary | ICD-10-CM

## 2023-08-11 DIAGNOSIS — E11.9 CONTROLLED TYPE 2 DIABETES MELLITUS WITHOUT COMPLICATION, WITH LONG-TERM CURRENT USE OF INSULIN (HCC): Primary | ICD-10-CM

## 2023-08-11 LAB
CARTRIDGE LOT#: ABNORMAL NUMERIC
GLUCOSE BLOOD: 92
HEMOGLOBIN A1C: 6 % (ref 4.3–5.6)
TEST STRIP LOT #: NORMAL NUMERIC

## 2023-08-11 PROCEDURE — 3008F BODY MASS INDEX DOCD: CPT

## 2023-08-11 PROCEDURE — 99214 OFFICE O/P EST MOD 30 MIN: CPT

## 2023-08-11 PROCEDURE — 3044F HG A1C LEVEL LT 7.0%: CPT

## 2023-08-11 PROCEDURE — 3074F SYST BP LT 130 MM HG: CPT

## 2023-08-11 PROCEDURE — 3078F DIAST BP <80 MM HG: CPT

## 2023-08-11 PROCEDURE — 83036 HEMOGLOBIN GLYCOSYLATED A1C: CPT

## 2023-08-11 PROCEDURE — 82947 ASSAY GLUCOSE BLOOD QUANT: CPT

## 2023-08-11 NOTE — PROGRESS NOTES
Name: Darvin Go Date: 8/11/2023    Referring Physician: No ref. provider found  Dr. Marilu Bloch   Follow up for DM      HISTORY OF PRESENT ILLNESS   Darvin Go  is a 33year old female who presents for follow up on diabetes management. 5/2022 was seen during twin pregnancy which unfortunately did end in loss due to PPROM at 20 weeks. She is currently undergoing IVF treatments. Did have one unsuccessful round earlier this year. Is currently waiting until cycle next month to hopefully proceed with embryo transfer. Has been maintained on MDI while undergoing fertility treatments to optimize blood sugar control for conception. FAMILY HISTORY OF DIABETES  -father- DM type 2  -Maternal grandmother- DM type 2  -Paternal grandmother- DM type 2    DIABETES HISTORY  Diagnosed: GDM with prior pregnancy in 2017, progressed to type 2 diabetes controlled with diet. Prior HbA, C or glycohemoglobin were 6/2018-6.0%; 10/2020- 7.8%; 1/2021- 6.7%; 3/2022- 7.6%; 6.2% 5/2022; 7.2% 1/2023; 5.6% 5/2023; 6.0% POC today   Previous DM medications:   Insulin during previous pregnancy  CURRENT MEDICATIONS FOR DM:  Levemir -54 units subcutaneous daily   Novolog- 16-14-24 TID with meals   HOME GLUCOSE READINGS:   Hypoglycemia: Infrequent since last visit   Meter or log book today: Checking 3-4 times daily- fasting and post prandial readings  Reviewed for last 2 weeks:She has been sending glucose readings every 3-4 weeks for insulin adjustments as needed in preparation for pregnancy. Udtkjkg-'s   After breakfast- 100-130  After lunch- <120's   After dinner-115, 119, 86   --> Has pattern of fasting hyperglycemia when starting fertility hormones in preparation for egg retrieval but this typically resolves on it's own after medications completed.    HISTORY OF DIABETES COMPLICATIONS:  History of Retinopathy: No - last eye exam within the last 12 months: Yes 12/2022  History of Neuropathy: No  History of Nephropathy: No    ASSOCIATED COMPLICATIONS:   HTN: No   Hyperlipidemia: No   Coronary Artery Disease: No  Cerebrovascular Disease: No  Peripheral Vascular Disease: No    DIETARY COMPLIANCE:  Very Good- still watching carbohydrates and incorporating protein in meals   Breakfast- 2 hard boiled eggs with sausage, low carb egg wrap and ezekial bread   Lunch- turkey Hummus wrap with berries   Dinner- side salad with chicken or chickpea pasta   Beverages- water, tea   Snacks- wheat crackers with cheese, yogurt  EXERCISE:   Yes- walking two times daily - staying active during the day - more active with daughter during months. ROS:  Polyuria, polyphagia, polydipsia: no  Paresthesias: no  Blurred vision: no  Recent steroids, illness or infections: No   Osteoporosis- No  Thyroid disease- No    MEDICATIONS:   insulin aspart (NOVOLOG FLEXPEN) 100 Units/mL Subcutaneous Solution Pen-injector, TAKE 14 UNITS WITH BREAKFAST,18 UNITS WITH LUNCH, AND 20 UNITS WITH DINNER (Patient taking differently: TAKE 16 UNITS WITH BREAKFAST,14 UNITS WITH LUNCH, AND 24 UNITS WITH DINNER), Disp: 48 mL, Rfl: 0  insulin detemir (LEVEMIR FLEXPEN) 100 UNIT/ML Subcutaneous Solution Pen-injector, Inject 48 Units into the skin nightly. (Patient taking differently: Inject 54 Units into the skin nightly.), Disp: 30 mL, Rfl: 1  Insulin Pen Needle (BD PEN NEEDLE ORIGINAL U/F) 29G X 12.7MM Does not apply Misc, Use to inject insulin 4 times daily, Disp: 400 each, Rfl: 0  Insulin Pen Needle (BD PEN NEEDLE PARAMJIT 2ND GEN) 32G X 4 MM Does not apply Misc, Use to inject insulin three times a day, Disp: 300 each, Rfl: 0  OneTouch Delica Lancets 90D Does not apply Misc, 4 x daily, Disp: 200 each, Rfl: 3  Glucose Blood (ONETOUCH VERIO) In Vitro Strip, 4x daily, Disp: 200 strip, Rfl: 3  Blood Glucose Monitoring Suppl (ONETOUCH VERIO FLEX SYSTEM) w/Device Does not apply Kit, 1 kit in the morning, at noon, in the evening, and at bedtime. May substitute per insurance formulary, Disp: 1 kit, Rfl: 3  prenatal vitamin w/DHA 27-0.8-228 MG Oral Cap, Take 1 capsule by mouth daily. , Disp: , Rfl:   Vitamin D3, Cholecalciferol, 50 MCG (2000 UT) Oral Tab, Take 1 tablet (2,000 Units total) by mouth daily. , Disp: , Rfl:     No current facility-administered medications on file prior to visit. ALLERGIES:   No Known Allergies    SOCIAL HISTORY:   Social History    Socioeconomic History      Marital status:       Spouse name: Not on file      Number of children: Not on file      Years of education: Not on file      Highest education level: Not on file    Occupational History      Not on file    Tobacco Use      Smoking status: Never      Smokeless tobacco: Never    Substance and Sexual Activity      Alcohol use: No      Drug use: No      Sexual activity: Yes        Partners: Male        Birth control/protection: OCP    Other Topics      Concerns:        Not on file    Social History Narrative      Not on file    Social Determinants of Health  Financial Resource Strain: Not on file  Food Insecurity: Not on file  Transportation Needs: Not on file  Physical Activity: Not on file  Stress: Not on file  Social Connections: Not on file  Housing Stability: Not on file  PAST MEDICAL HISTORY:   Past Medical History:   Diagnosis Date    Diabetes ([facility]     Gestational diabetes 2017    History of type 2 diabetes mellitus     History of varicella as a child     Infertility, female        PAST SURGICAL HISTORY:   Past Surgical History:   Procedure Laterality Date      2017    CHOLECYSTECTOMY      HC  SECTION LEVEL I      LASIK Bilateral ~    Dr. Eduarda Antoine:    23  1027   BP: 118/61   Pulse: 87         General Appearance:  alert, well developed, in no acute distress  Eyes:  pupils are equal, round and reactive.  Normal conjunctivae and normal sclera   Neck: Trachea midline: Normal.   Back: no kyphosis or back tenderness  Lymph Nodes:  No abnormal nodes noted  Musculoskeletal:  normal muscle strength and tone  Skin:  normal moisture and skin texture  Hair & Nails:  normal scalp hair     Hematologic:  no excessive bruising  Neuro:  sensory grossly intact and motor grossly intact  Psychiatric:  oriented to time, self, and place  Nutritional:  no abnormal weight gain or loss  Feet: Bilateral barefoot skin diabetic exam is normal, visualized feet and the appearance is normal.  Bilateral monofilament/sensation of both feet is normal.  Pulsation pedal pulse exam of both lower legs/feet is normal as well. LABS: Pertinent labs reviewed    ASSESSMENT/PLAN:    Diabetes mellitus type 2 uncontrolled  -HgA1c- 6.0% POC today  -Congratulated patient on continued improved glycemic control   -Reviewed with patient the pathogenesis of diabetes, clinical significance of A1c, and common complications such as: microvascular, macrovascular and diabetic ketoacidosis. Patient verbalizes understanding of the importance of glycemic control and the goals of therapy.   -Discussed with patient glucose targets ranges for preconception and pregnancy (Fasting <90 and 2hr post prandial <120). -reviewed limiting carbs to 165gm of carbs/day.   -discussed importance of incorporating low carb snacks during the day and at bedtime  -reviewed blood sugar target goals: fasting <90 and 2hrs post prandial <120  -reviewed necessity to send us her BG readings every 3 days for review during pregnancy  -Discussed dangers of uncontrolled diabetes in pregnancy and possible fetal complications.   -Discussed importance of glycemic control for maternal and fetal health  -Discussed insulin injection site rotation.   -Increase Levemir to 58  units subcutaneous daily. Reviewed insulin timing and administration and risks vs benefits.  She does note fasting sugars were better controlled on NPH so may plan to change back to NPH when she finishes current supply of Levemir if fasting sugars still above goal after today's adjustment. - Continue Novolog -16- 14-24 units three times daily with meals. Reviewed insulin timing and administration.  -Continue to check sugars 2-4 times daily- for now she can alternate checking fasting and after meals but will need to check 4 times daily after positive pregnancy test and she is aware.   -She will send glucose readings via [employer] in 2 -4weeks as she has been     -normotensive   -normal lipids 1/2023  -elevated urine microalbumin 1/2023- reviewed importance of good glycemic control   -Reviewed importance of yearly optho follow up   -normal foot exam today    RTC in 4-5 months, or sooner if positive pregnancy test- understands to call clinic with positive test.   Liliana De León, APRN  8/11/23  A total of  30 minutes was spent on obtaining history, reviewing pertinent imaging/labs and specialists notes, evaluating patient, providing multiple treatment options, reinforcing diet/exercise and compliance, and completing documentation.

## 2023-08-11 NOTE — PATIENT INSTRUCTIONS
Increase Levemir to 58 units subcutaneous daily       Continue Novolog doses.        UESEBIO Oneal- diabetes educator

## 2023-08-21 RX ORDER — BLOOD SUGAR DIAGNOSTIC
STRIP MISCELLANEOUS
Qty: 200 STRIP | Refills: 3 | Status: SHIPPED | OUTPATIENT
Start: 2023-08-21

## 2023-08-30 ENCOUNTER — PATIENT MESSAGE (OUTPATIENT)
Dept: ENDOCRINOLOGY CLINIC | Facility: CLINIC | Age: 33
End: 2023-08-30

## 2023-08-30 RX ORDER — INSULIN ASPART 100 [IU]/ML
INJECTION, SOLUTION INTRAVENOUS; SUBCUTANEOUS
Qty: 48 ML | Refills: 0 | Status: SHIPPED | OUTPATIENT
Start: 2023-08-30

## 2023-08-31 RX ORDER — PERPHENAZINE 16 MG/1
TABLET, FILM COATED ORAL
Qty: 400 EACH | Refills: 0 | Status: SHIPPED | OUTPATIENT
Start: 2023-08-31

## 2023-08-31 RX ORDER — LANCETS
EACH MISCELLANEOUS
Qty: 400 EACH | Refills: 0 | Status: SHIPPED | OUTPATIENT
Start: 2023-08-31

## 2023-09-07 ENCOUNTER — PATIENT MESSAGE (OUTPATIENT)
Dept: ENDOCRINOLOGY CLINIC | Facility: CLINIC | Age: 33
End: 2023-09-07

## 2023-09-07 NOTE — TELEPHONE ENCOUNTER
From: Eden Sweet  To: EUSEBIO Nelson  Sent: 9/7/2023 9:53 AM CDT  Subject: Update    Hello,  I have started my fertility medicine yesturday. It looks like my transfer might be the beginning of October. Here are my glucose numbers. I have been feeling under the weather the past few days. I have like 7 sticks of levimir left.   8/26-B-96, L-101  8/27-F-118, B-108  8/28-L-110, D-120  8/29-F-122, B-100  8/30-B-114, L-105  8/31-B-105, D-115  9/1-F-128, B-100  9/3-B-110, L-99  9/4-L-119, D-101  9/5-F-126, B-100  9/6-B-94, L-110  9/7-F-121

## 2023-09-07 NOTE — TELEPHONE ENCOUNTER
Patient sees Phani Romero - asked patient to clarify insulin types and doses for Cali Steve to review.

## 2023-09-14 RX ORDER — INSULIN ASPART 100 [IU]/ML
INJECTION, SOLUTION INTRAVENOUS; SUBCUTANEOUS
Qty: 42 ML | Refills: 0 | Status: SHIPPED | OUTPATIENT
Start: 2023-09-14

## 2023-09-14 RX ORDER — INSULIN HUMAN 100 [IU]/ML
66 INJECTION, SUSPENSION SUBCUTANEOUS NIGHTLY
Qty: 21 ML | Refills: 2 | Status: SHIPPED | OUTPATIENT
Start: 2023-09-14 | End: 2023-09-19

## 2023-09-14 NOTE — TELEPHONE ENCOUNTER
Julissa Troy,    Please see patient update. Do you want to see her for appointment? She has follow up scheduled with [facility] 1/11/24. Please advise.

## 2023-09-14 NOTE — TELEPHONE ENCOUNTER
Patient recently seen by [facility] on 8/11/23. Will add patient on to my schedule once she has a confirmed pregnancy. Thank you!

## 2023-09-19 RX ORDER — INSULIN HUMAN 100 [IU]/ML
66 INJECTION, SUSPENSION SUBCUTANEOUS NIGHTLY
Qty: 21 ML | Refills: 2 | Status: SHIPPED | OUTPATIENT
Start: 2023-09-19 | End: 2023-09-19

## 2023-09-19 NOTE — TELEPHONE ENCOUNTER
Parent informed , will stop pulmozyme if blood continues and will office    RN resent NPH insulin as brand name to see if this corrects insurance issue - patient has been updated via [employer].

## 2023-09-19 NOTE — TELEPHONE ENCOUNTER
Yoshi Guillaume,    Can we try Novolin N - Humulin N was sent as generic and brand and still rejected by insurance. Please advise.

## 2023-09-22 ENCOUNTER — TELEPHONE (OUTPATIENT)
Dept: ENDOCRINOLOGY CLINIC | Facility: CLINIC | Age: 33
End: 2023-09-22

## 2023-09-22 NOTE — TELEPHONE ENCOUNTER
Alternative requested :  Med is not covered please change. Current Outpatient Medications   Medication Sig Dispense Refill    Insulin NPH, Human,, Isophane, (NOVOLIN N FLEXPEN) 100 UNIT/ML Subcutaneous Suspension Pen-injector Inject 66 Units into the skin at bedtime.  60 mL 0

## 2023-09-25 RX ORDER — HUMAN INSULIN 100 [IU]/ML
66 INJECTION, SUSPENSION SUBCUTANEOUS NIGHTLY
Qty: 60 ML | Refills: 0 | Status: SHIPPED | OUTPATIENT
Start: 2023-09-25 | End: 2023-12-24

## 2023-11-02 RX ORDER — INSULIN ASPART 100 [IU]/ML
INJECTION, SOLUTION INTRAVENOUS; SUBCUTANEOUS
Qty: 30 ML | Refills: 0 | Status: SHIPPED | OUTPATIENT
Start: 2023-11-02

## 2023-11-02 RX ORDER — PEN NEEDLE, DIABETIC 32GX 5/32"
NEEDLE, DISPOSABLE MISCELLANEOUS
Qty: 300 EACH | Refills: 0 | Status: SHIPPED | OUTPATIENT
Start: 2023-11-02

## 2023-11-02 RX ORDER — HUMAN INSULIN 100 [IU]/ML
66 INJECTION, SUSPENSION SUBCUTANEOUS NIGHTLY
Qty: 60 ML | Refills: 0 | Status: SHIPPED | OUTPATIENT
Start: 2023-11-02 | End: 2024-01-31

## 2023-11-02 NOTE — TELEPHONE ENCOUNTER
LOV: 8/11/23    RTC:  4-5 Months    FU: 1/11/24        Per TE 10/26/23, new rx pending below with updated inuslin regimen

## 2023-11-09 ENCOUNTER — PATIENT MESSAGE (OUTPATIENT)
Dept: ENDOCRINOLOGY CLINIC | Facility: CLINIC | Age: 33
End: 2023-11-09

## 2023-11-09 NOTE — TELEPHONE ENCOUNTER
From: Rishi Stevens  To: Luci Moss  Sent: 11/9/2023 12:46 PM CST  Subject: Glucose and fertility update    Hello,  Here are my glucose numbers. Units of insulin for Fasting (Novolin)-74 units, (Novolog) B-4 units, L-6 units, D-22 units. I am on more meds for my IVF transfer and my transfer is this upcoming Monday November 13. I will update you as I know pregnancy does affect my current glucose numbers as well as units of insulin I am taking.     10/27- F-89, B-90, L-115, D-59  10/28-F-82, B-78, L-90, D-108  10/29-F-87, B-100, L-115, D-113  10/30- F-90, B-110, L-100, D-84  10/31- F-95, B-90, L-115, D-104  11/1- F-80, B-84, L-90, D-125  11/2-F-81, B-82, L-100, D-71  11/3- F-90, B-85, L-92, D-80  11/4- F-88, B-101, L-115, D-95  11/5-F 96, B-92, L-93, D-109  11/6-F-90, B-83, L-83, D-108  11/7-F-67, B-90, L-109, D82  11/8-F-91, B-119, L-100, D-115    Rishi Stevens

## 2023-11-20 ENCOUNTER — NURSE TRIAGE (OUTPATIENT)
Dept: INTERNAL MEDICINE CLINIC | Facility: CLINIC | Age: 33
End: 2023-11-20

## 2023-11-20 ENCOUNTER — OFFICE VISIT (OUTPATIENT)
Dept: INTERNAL MEDICINE CLINIC | Facility: CLINIC | Age: 33
End: 2023-11-20
Payer: COMMERCIAL

## 2023-11-20 VITALS
SYSTOLIC BLOOD PRESSURE: 130 MMHG | TEMPERATURE: 98 F | WEIGHT: 240 LBS | HEART RATE: 83 BPM | BODY MASS INDEX: 42.52 KG/M2 | DIASTOLIC BLOOD PRESSURE: 78 MMHG | HEIGHT: 62.99 IN

## 2023-11-20 DIAGNOSIS — H92.02 LEFT EAR PAIN: Primary | ICD-10-CM

## 2023-11-20 PROCEDURE — 99213 OFFICE O/P EST LOW 20 MIN: CPT | Performed by: NURSE PRACTITIONER

## 2023-11-20 PROCEDURE — 3008F BODY MASS INDEX DOCD: CPT | Performed by: NURSE PRACTITIONER

## 2023-11-20 PROCEDURE — 3078F DIAST BP <80 MM HG: CPT | Performed by: NURSE PRACTITIONER

## 2023-11-20 PROCEDURE — 3075F SYST BP GE 130 - 139MM HG: CPT | Performed by: NURSE PRACTITIONER

## 2023-11-20 NOTE — TELEPHONE ENCOUNTER
Action Requested: Summary for Provider     []  Critical Lab, Recommendations Needed  [] Need Additional Advice  []   FYI    []   Need Orders  [] Need Medications Sent to Pharmacy  [x]  Other     SUMMARY:   Verified name and . Patient reports left ear pain and left ear congestion. She denies fevers or drainage at this time. She is requesting prescription medication. She states she is concerned about what would be safe as she just had an embryo transfer last week.     Appointment scheduled:  Future Appointments   Date Time Provider Jessica Miller   2023 11:40 AM EUSEBIO Wong [facility]   2023 10:20 AM [facility]   2024 10:45 AM EUSEBIO Luke [facility] Karol FIGUEROA         Reason for call: Acute  Onset: Data Unavailable      Reason for Disposition   Patient wants to be seen    Protocols used: Ear - Congestion-A-OH

## 2023-11-20 NOTE — ASSESSMENT & PLAN NOTE
Mild left ear pain. No fever, chills, body aches or feeling ill. .  Since she just had embryo transfer will treat conservatively. Plan  Warm compresses to left ear 15 to 20 minutes 4 times a day. Hydrate with Gatorade  Patient to call me next week if symptoms do not improve or worsen.

## 2023-11-24 RX ORDER — AMOXICILLIN AND CLAVULANATE POTASSIUM 875; 125 MG/1; MG/1
1 TABLET, FILM COATED ORAL 2 TIMES DAILY
Qty: 20 TABLET | Refills: 0 | Status: SHIPPED | OUTPATIENT
Start: 2023-11-24 | End: 2023-12-04

## 2023-11-27 RX ORDER — PERPHENAZINE 16 MG/1
TABLET, FILM COATED ORAL
Qty: 400 STRIP | Refills: 0 | Status: SHIPPED | OUTPATIENT
Start: 2023-11-27

## 2023-12-05 ENCOUNTER — OFFICE VISIT (OUTPATIENT)
Dept: OBGYN CLINIC | Facility: CLINIC | Age: 33
End: 2023-12-05
Payer: COMMERCIAL

## 2023-12-05 ENCOUNTER — TELEPHONE (OUTPATIENT)
Dept: OBGYN CLINIC | Facility: CLINIC | Age: 33
End: 2023-12-05

## 2023-12-05 VITALS
BODY MASS INDEX: 43 KG/M2 | SYSTOLIC BLOOD PRESSURE: 129 MMHG | WEIGHT: 242.19 LBS | DIASTOLIC BLOOD PRESSURE: 80 MMHG | HEART RATE: 80 BPM

## 2023-12-05 DIAGNOSIS — Z31.69 ENCOUNTER FOR PRECONCEPTION CONSULTATION: Primary | ICD-10-CM

## 2023-12-05 DIAGNOSIS — Z31.83 IN VITRO FERTILIZATION: ICD-10-CM

## 2023-12-05 DIAGNOSIS — Z01.419 WELL WOMAN EXAM: Primary | ICD-10-CM

## 2023-12-05 DIAGNOSIS — Z12.4 SCREENING FOR CERVICAL CANCER: ICD-10-CM

## 2023-12-05 DIAGNOSIS — Z12.4 CERVICAL CANCER SCREENING: ICD-10-CM

## 2023-12-05 DIAGNOSIS — Z87.59 HISTORY OF PRETERM PREMATURE RUPTURE OF MEMBRANES (PPROM): ICD-10-CM

## 2023-12-05 PROCEDURE — 3074F SYST BP LT 130 MM HG: CPT | Performed by: OBSTETRICS & GYNECOLOGY

## 2023-12-05 PROCEDURE — 3079F DIAST BP 80-89 MM HG: CPT | Performed by: OBSTETRICS & GYNECOLOGY

## 2023-12-05 PROCEDURE — 99395 PREV VISIT EST AGE 18-39: CPT | Performed by: OBSTETRICS & GYNECOLOGY

## 2023-12-05 RX ORDER — SENNOSIDES 8.6 MG
500 TABLET ORAL
COMMUNITY

## 2023-12-05 RX ORDER — ESTRADIOL 2 MG/1
TABLET ORAL
COMMUNITY
Start: 2023-07-11

## 2023-12-05 RX ORDER — GARLIC EXTRACT 500 MG
1 CAPSULE ORAL DAILY
COMMUNITY

## 2023-12-05 RX ORDER — AMOXICILLIN 500 MG
CAPSULE ORAL
COMMUNITY

## 2023-12-05 RX ORDER — DROSPIRENONE AND ETHINYL ESTRADIOL 0.03MG-3MG
KIT ORAL
COMMUNITY
Start: 2023-11-29

## 2023-12-05 RX ORDER — FOLIC ACID 1 MG/1
TABLET ORAL DAILY
COMMUNITY

## 2023-12-05 NOTE — TELEPHONE ENCOUNTER
Needs MFM preconception consult.   History of 20 week PPROM of twins and had D&E at [facility] thereafter

## 2023-12-05 NOTE — PROGRESS NOTES
HPI:  The patient is a 26 yo F here for WWE. On OCs in prep for IVF. H/o 20 week PPROM of twins with subsequent D&E at [facility]. Will be seeing Dr THOMAS at [facility] for next round of IVF. Pt newly diabetic--on insulin. Management with Endo    Patient's last menstrual period was 2023 (approximate).       Latest Ref Rng & Units 6/15/2020     5:08 PM 2018     6:51 PM   RECENT PAP RESULTS   Thinprep Pap Negative for intraepithelial lesion or malignancy Negative for intraepithelial lesion or malignancy  Negative for intraepithelial lesion or malignancy    HPV Negative Negative          Pap Date: 06/15/20  Pap Result Notes: Neg Pap/HPV  Follow Up Recommendation: NPN 3/23/22 CAP        Depression Screening (PHQ-2/PHQ-9): Over the LAST 2 WEEKS   Little interest or pleasure in doing things (over the last two weeks)?: Not at all    Feeling down, depressed, or hopeless (over the last two weeks)?: Not at all    PHQ-2 SCORE: 0          Reviewed medical and surgical history below       OBSTETRICS HISTORY:  OB History    Para Term  AB Living   3 1 1 0 1 1   SAB IAB Ectopic Multiple Live Births   0 1 0 0 1       GYNE HISTORY:  History   Sexual Activity    Sexual activity: Yes    Partners: Male    Birth control/ protection: OCP            MEDICAL HISTORY:  Past Medical History:   Diagnosis Date    Diabetes ([address]     Gestational diabetes 2017    History of type 2 diabetes mellitus     History of varicella as a child     Infertility, female        SURGICAL HISTORY:  Past Surgical History:   Procedure Laterality Date      2017    CHOLECYSTECTOMY      HC  SECTION LEVEL I      LASIK Bilateral ~    Dr. Belen Adler HISTORY:  Social History     Socioeconomic History    Marital status:      Spouse name: Not on file    Number of children: Not on file    Years of education: Not on file    Highest education level: Not on file   Occupational History    Not on file   Tobacco Use Smoking status: Never    Smokeless tobacco: Never   Substance and Sexual Activity    Alcohol use: No    Drug use: No    Sexual activity: Yes     Partners: Male     Birth control/protection: OCP   Other Topics Concern    Not on file   Social History Narrative    Not on file     Social Determinants of Health     Financial Resource Strain: Not on file   Food Insecurity: Not on file   Transportation Needs: Not on file   Physical Activity: Not on file   Stress: Not on file   Social Connections: Not on file   Housing Stability: Not on file       FAMILY HISTORY:  Family History   Problem Relation Age of Onset    Glaucoma Father     Diabetes Maternal Grandmother     Diabetes Paternal Grandmother     Macular degeneration Neg        MEDICATIONS:    Current Outpatient Medications:     drospirenone-ethinyl estradiol 3-0.03 MG Oral Tab, TAKE ONE TABLET BY MOUTH EVERY DAY, ACTIVE TABLETS ONLY, SKIP PLACEBO TABLETS, Disp: , Rfl:     estradiol 2 MG Oral Tab, TAKE 1 TABLET BY MOUTH BY MOUTH TWICE A DAY, Disp: , Rfl:     folic acid 1 MG Oral Tab, Take by mouth daily. , Disp: , Rfl:     Coenzyme Q10 (COQ-10) 400 MG Oral Cap, Take 500 mg by mouth., Disp: , Rfl:     acidophilus-pectin Oral Cap, Take 1 capsule by mouth daily. FERTILITY PROBIOTIC, Disp: , Rfl:     Omega-3 Fatty Acids (FISH OIL) 1200 MG Oral Cap, Take by mouth., Disp: , Rfl:     Glucose Blood (CONTOUR NEXT TEST) In Vitro Strip, Use to test blood sugars 4 times daily, Disp: 400 strip, Rfl: 0    Insulin NPH, Human,, Isophane, (NOVOLIN N FLEXPEN) 100 UNIT/ML Subcutaneous Suspension Pen-injector, Inject 74 Units into the skin at bedtime. , Disp: 69 mL, Rfl: 3    insulin aspart (NOVOLOG FLEXPEN) 100 Units/mL Subcutaneous Solution Pen-injector, Inject 4 Units into the skin daily with breakfast AND 6 Units daily with lunch AND 22 Units daily with dinner., Disp: 30 mL, Rfl: 0    Insulin Pen Needle (BD PEN NEEDLE PARAMJIT 2ND GEN) 32G X 4 MM Does not apply Misc, Use to inject insulin three times a day, Disp: 300 each, Rfl: 0    Microlet Lancets Does not apply Misc, Use to test blood sugars 4 times daily, Disp: 400 each, Rfl: 0    Glucose Blood (ONETOUCH VERIO) In Vitro Strip, TEST 4 TIMES A DAY, Disp: 200 strip, Rfl: 3    Insulin Pen Needle (BD PEN NEEDLE ORIGINAL U/F) 29G X 12.7MM Does not apply Misc, Use to inject insulin 4 times daily, Disp: 400 each, Rfl: 0    OneTouch Delica Lancets 25Y Does not apply Misc, 4 x daily, Disp: 200 each, Rfl: 3    Blood Glucose Monitoring Suppl (ONETOUCH VERIO FLEX SYSTEM) w/Device Does not apply Kit, 1 kit in the morning, at noon, in the evening, and at bedtime. May substitute per insurance formulary, Disp: 1 kit, Rfl: 3    prenatal vitamin w/DHA 27-0.8-228 MG Oral Cap, Take 1 capsule by mouth daily. , Disp: , Rfl:     Vitamin D3, Cholecalciferol, 50 MCG (2000 UT) Oral Tab, Take 1 tablet (2,000 Units total) by mouth daily. , Disp: , Rfl:     ALLERGIES:  No Known Allergies      Review of Systems:  Constitutional:  Denies fevers and chills   Cardiovascular:  denies chest pain or palpitations  Respiratory:  denies shortness of breath  Gastrointestinal:  denies heartburn, abdominal pain, diarrhea or constipation  Genitourinary:  denies dysuria, incontinence, abnormal vaginal discharge, vaginal itching  Musculoskeletal:  denies back pain. Skin/Breast:  Denies any breast pain, lumps, or discharge. Neurological:  denies headaches, extremity weakness  Psychiatric: denies depression or anxiety.       Vitals:    12/05/23 1512   BP: 129/80   Pulse: 80       PHYSICAL EXAM:   Constitutional: well developed, well nourished  Head/Face: normocephalic  Neck/Thyroid: thyroid symmetric, no thyromegaly, no nodules, no adenopathy  Heart: Regular rate and rhythm   Lungs: clear to ascultation bilaterally   Lymphatic:no abnormal supraclavicular or axillary adenopathy is noted  Breast: normal without palpable masses, tenderness, asymmetry, nipple discharge, nipple retraction or skin changes  Abdomen:  soft, nontender, nondistended, no masses  Skin/Hair: no unusual rashes or bruises  Extremities: no edema, no cyanosis  Psychiatric: Appropriate mood and affect    Pelvic Exam:  External Genitalia: normal appearance, hair distribution, and no lesions  Urethral Meatus:  normal in size, location, without lesions and prolapse  Bladder:  No fullness, masses or tenderness  Vagina:  Normal appearance without lesions, no abnormal discharge  Cervix:  Normal without tenderness on motion  Uterus: normal in size, contour, position, mobility, without tenderness  Adnexa: normal without masses or tenderness  Perineum: normal    Assessment/Plan:  Jesse Dueñas was seen today for gyn exam.    Diagnoses and all orders for this visit:    Well woman exam    Cervical cancer screening    In vitro fertilization        WWE:   Reviewed ASCCP guidelines with the patient -- Pap/hpv today  Cervical cultures off pap in prep for IVF  Recs for MFM preconception consult given h/o 20 week PPROM of twins. Gema recommended she only do SET to avoid risk of multiple pregnancy. She will need CL and close surveillance with MFM.   Pt working with nai re: diabetes  Breast Health:     Mammo @ 33 yo  Encouraged monthly self breast exams with the patient   Discussed diet, exercise, MVIs with Vit D  Follow up in 1 yr for [facility]

## 2023-12-06 LAB
C TRACH DNA SPEC QL NAA+PROBE: NEGATIVE
HPV I/H RISK 1 DNA SPEC QL NAA+PROBE: NEGATIVE
N GONORRHOEA DNA SPEC QL NAA+PROBE: NEGATIVE
T VAGINALIS RRNA SPEC QL NAA+PROBE: NEGATIVE

## 2023-12-12 ENCOUNTER — PATIENT MESSAGE (OUTPATIENT)
Dept: INTERNAL MEDICINE CLINIC | Facility: CLINIC | Age: 33
End: 2023-12-12

## 2023-12-30 ENCOUNTER — PATIENT MESSAGE (OUTPATIENT)
Dept: ENDOCRINOLOGY CLINIC | Facility: CLINIC | Age: 33
End: 2023-12-30

## 2024-01-01 ENCOUNTER — PATIENT MESSAGE (OUTPATIENT)
Dept: ENDOCRINOLOGY CLINIC | Facility: CLINIC | Age: 34
End: 2024-01-01

## 2024-01-02 NOTE — TELEPHONE ENCOUNTER
From: Colette Mantilla  To: Colette Jaeger  Sent: 12/30/2023 2:40 PM CST  Subject: Egg retrieval     Franklin Oneill,  I hope all is well. My egg retrieval is going to be on Tuesday January 2 at 12pm. I know they don’t want me to eat or drink either in the morning. Please let me know what your planned insulin intake would be. Thank you so much!  Colette

## 2024-01-02 NOTE — TELEPHONE ENCOUNTER
From: Colette Mantilla  To: Colette Jaeger  Sent: 1/1/2024 10:14 AM CST  Subject: Urgent- egg retrieval 1/2/1024    Hello! Happy New Year!  I hope all is well. My egg retrieval is going to be tomorrow Tuesday January 2 at 12pm. I know they don’t want me to eat or drink either in the morning. Please let me know what your planned insulin intake would be. Thank you so much!  Colette

## 2024-01-07 NOTE — PROGRESS NOTES
Outpatient Preconceptual Maternal-Fetal Medicine Consultation    Dear Dr. Trinh    Thank you for requesting a preconceptual maternal-fetal medicine consultation your patient Colette Mantilla.  As you are aware she is a 33 year old female who is not currently pregnant.  A preconceptual consultation was requested secondary to pre-gestational diabetes and prior PROM with twin gestation (and ultimate EAB).  Her medical records and labs were reviewed.    HISTORY  OB History    Para Term  AB Living   3 1 1   1 1   SAB IAB Ectopic Multiple Live Births     1     1      # Outcome Date GA Lbr Karri/2nd Weight Sex Delivery Anes PTL Lv   3 Term 17 39w0d  7 lb 10 oz (3.459 kg) F Caesarean  N INDRA      Birth Comments: [facility]      Complications: Temp 100.4 or greater   2 IAB        Y FD   1               # 1 - Date: None, Sex: None, Weight: None, GA: None, Delivery: None, Apgar1: None, Apgar5: None, Living: None, Birth Comments: None    # 2 - Date: None, Sex: None, Weight: None, GA: None, Delivery: None, Apgar1: None, Apgar5: None, Living: Fetal Demise, Birth Comments: None    # 3 - Date: 17, Sex: Female, Weight: 7 lb 10 oz (3.459 kg), GA: 39w0d, Delivery: Caesarean Section, Apgar1: None, Apgar5: None, Living: Living, Birth Comments: [facility]    Past Medical History  The patient  has a past medical history of Diabetes ([facility]), Gestational diabetes (), History of type 2 diabetes mellitus, History of varicella as a child, and Infertility, female.    She has no past medical history of Breast CA ([facility]).    Past Surgical History  The patient  has a past surgical history that includes cholecystectomy; hc  section level i; LASIK (Bilateral, ~); and  ().    Family History  The patient She indicated that her father is . She indicated that her maternal grandmother is alive. She indicated that her paternal grandmother is . She indicated that the status of  her neg is unknown.      Medications:   Current Outpatient Medications:     tobramycin 0.3 % Ophthalmic Solution, Place 2 drops into both eyes every 6 (six) hours for 7 days., Disp: 1 each, Rfl: 0    folic acid 1 MG Oral Tab, Take by mouth daily., Disp: , Rfl:     Coenzyme Q10 (COQ-10) 400 MG Oral Cap, Take 500 mg by mouth., Disp: , Rfl:     Omega-3 Fatty Acids (FISH OIL) 1200 MG Oral Cap, Take by mouth., Disp: , Rfl:     Glucose Blood (CONTOUR NEXT TEST) In Vitro Strip, Use to test blood sugars 4 times daily, Disp: 400 strip, Rfl: 0    Insulin NPH, Human,, Isophane, (NOVOLIN N FLEXPEN) 100 UNIT/ML Subcutaneous Suspension Pen-injector, Inject 74 Units into the skin at bedtime., Disp: 69 mL, Rfl: 3    insulin aspart (NOVOLOG FLEXPEN) 100 Units/mL Subcutaneous Solution Pen-injector, Inject 4 Units into the skin daily with breakfast AND 6 Units daily with lunch AND 22 Units daily with dinner., Disp: 30 mL, Rfl: 0    Insulin Pen Needle (BD PEN NEEDLE PARAMJIT 2ND GEN) 32G X 4 MM Does not apply Misc, Use to inject insulin three times a day, Disp: 300 each, Rfl: 0    Microlet Lancets Does not apply Misc, Use to test blood sugars 4 times daily, Disp: 400 each, Rfl: 0    Glucose Blood (ONETOUCH VERIO) In Vitro Strip, TEST 4 TIMES A DAY, Disp: 200 strip, Rfl: 3    Insulin Pen Needle (BD PEN NEEDLE ORIGINAL U/F) 29G X 12.7MM Does not apply Misc, Use to inject insulin 4 times daily, Disp: 400 each, Rfl: 0    Blood Glucose Monitoring Suppl (ONETOUCH VERIO FLEX SYSTEM) w/Device Does not apply Kit, 1 kit in the morning, at noon, in the evening, and at bedtime. May substitute per insurance formulary, Disp: 1 kit, Rfl: 3    prenatal vitamin w/DHA 27-0.8-228 MG Oral Cap, Take 1 capsule by mouth daily., Disp: , Rfl:     Vitamin D3, Cholecalciferol, 50 MCG (2000 UT) Oral Tab, Take 1 tablet (2,000 Units total) by mouth daily., Disp: , Rfl:     drospirenone-ethinyl estradiol 3-0.03 MG Oral Tab, TAKE ONE TABLET BY MOUTH EVERY DAY, ACTIVE  TABLETS ONLY, SKIP PLACEBO TABLETS (Patient not taking: Reported on 1/10/2024), Disp: , Rfl:     estradiol 2 MG Oral Tab, TAKE 1 TABLET BY MOUTH BY MOUTH TWICE A DAY (Patient not taking: Reported on 1/10/2024), Disp: , Rfl:     acidophilus-pectin Oral Cap, Take 1 capsule by mouth daily. FERTILITY PROBIOTIC, Disp: , Rfl:     OneTouch Delica Lancets 33G Does not apply Misc, 4 x daily, Disp: 200 each, Rfl: 3  Allergies: No Known Allergies    PHYSICAL EXAMINATION:  LMP 2023 (Approximate)   General: alert and oriented,no acute distress  Abdomen: gravid, soft, non-tender  Extremities: non-tender, no edema    DISCUSSION  During her visit we discussed and reviewed the following issues:  DIABETES MELLITUS  We discussed the risks associated with pregestational diabetes.  There is an increased risk of congenital malformations, fetal growth disturbances, preeclampsia, spontaneous  and intrauterine fetal demise (IUFD).  Infants of diabetic mothers (IDMs) are also at increased risk for hypoglycemia and hyperbilirubinemia.    Congenital Malformations:  Data from multiple studies have consistently shown a higher risk of major congenital malformations and miscarriage associated with increasing first trimester glycated hemoglobin values. Although glycated hemoglobin values from different laboratories may not be comparable because of differences in methodology and a lack of standardization among laboratories, a value >1 percent above the upper limit of the normal range is associated with an increased risk of congenital anomalies.  Patients with a markedly elevated glycated hemoglobin value have a markedly increased risk of congenital anomalies, particularly neural tube and cardiac defects. I informed the patient that more information about fetal development will be obtained from first and second trimester sonographic examinations and maternal serum analyte results.     Fetal Growth Disturbances:   Maternal diabetes  mellitus may double the incidence of LGA infants; it also changes the anthropometric measurements of infants of diabetic mothers (IDMs) compared with offspring of women without diabetes. Specifically, the chest-to-head and shoulder-to-head ratios are increased in IDMs.  LGA fetuses are at increased risk for a prolonged second stage of labor, shoulder dystocia, operative delivery, maternal and infant birth trauma, and  death. Maternal diabetes mellitus increases the likelihood of shoulder dystocia two- to six-fold compared to the population without diabetes and increases the likelihood of dystocia-associated fetal morbidity, such as brachial plexus injury. Accelerated fetal growth is also associated with an increased risk of  metabolic and physiologic disturbances. Continued control of blood glucose concentration during the third trimester is important to minimize the risk of these complications.   Impaired fetal growth is more common among women with diabetic vasculopathy and/or superimposed preeclampsia. It is associated with increased fetal and  morbidity and mortality, and has long-term health implications.  If there is evidence of intrauterine growth restriction, which is uncommon, but often related to preeclampsia or preexisting maternal vasculopathy, tests of fetal well-being are initiated.    Preeclampsia:  The incidences of hypertension and preeclampsia are increased in pregnant women with diabetes and are related to pregestational hypertension and vascular and renal disease. Poor glycemic control also appears to play a role.  Diagnosis and management of preeclampsia are similar to that in women without diabetes, except among those who enter pregnancy with preexisting nephropathy. In these women, diagnosing preeclampsia can be difficult and requires relying on deterioration of other markers.    Intrauterine Fetal Death (IUFD):  Intrauterine fetal demise is now a rare complication of  diabetic pregnancy, primarily due to achievement of good glycemic control. The fetus of the diabetic mother is at risk for hypoxia primarily from two mechanisms: (1) fetal hyperglycemia and hyperinsulinemia increase fetal oxygen consumption, which may induce fetal hypoxemia and acidosis if the oxygen needs of the fetus are not met and (2) maternal vasculopathy and hyperglycemia can lead to reduced uteroplacental perfusion, which may be associated with reduced fetal growth.  The American College of Obstetricians and Gynecologists (ACOG) recommends antepartum fetal testing for pregnancies complicated by pregestational diabetes. There are no data from large or randomized trials on which to make an evidenced-based recommendation as to which pregnancies complicated by diabetes should undergo fetal surveillance, when to start, what test to order, or how often to perform it.   Most experts agree starting weekly NST's at 32 weeks of gestation and increasing the frequency of testing to two times per week from 36 weeks until delivery. In complicated patients (IUGR, oligohydramnios, preeclampsia, or poorly controlled blood glucose concentrations) testing may start earlier in gestation and is performed more frequently. Any significant deterioration in maternal status necessitates reevaluation of the fetus. The frequency of intrauterine fetal death (excluding congenital malformations) with such protocols is approximately 3 per 1000 pregnancies in women with type 1 diabetes.    Glycemic Control:  Lab Results   Component Value Date    A1C 6.0 (A) 08/11/2023    A1C 5.6 05/10/2023    A1C 7.2 (H) 01/20/2023    A1C 6.2 (A) 05/11/2022    A1C 7.6 (H) 03/19/2022      On insulin - managed by endocrinology  We discussed goals of optimal glucose control: Fasting levels between 60 - 95, and - hour postprandial values of less than 120.  Blood sugars should be check at least 4 times daily and should be sent to the MFM office for weekly MFM  physician review.     Maternal Testing:  Recommendations for pregnancy testing were discussed with the patient. I advised regular  opthomalogical evaluation (with repeat evaluation as indicated), ECG, 24 hour urine collection for protein and creatinine clearance, as well as serum labs including a complete metabolic profile, CBC and HgB A1C.    Fetal Evaluation:  Fetal testing was also reviewed with the patient.  First trimester dating and early anatomic assessment is advised as well as a targeted ultrasound at 20-22 weeks gestation and a fetal echocardiogram at 22-24 weeks gestation.  In addition, growth ultrasounds should be performed every 4 weeks in the third trimester.  Finally, weekly NST evaluations should be initiated at 32 weeks gestation and increased to twice weekly at 36 weeks.        PRIOR  BIRTH  History  The patient had prior  PROM in a twin gestation.  Given pre-viable PROM EAB was elected.    Discussion:   birth refers to delivery prior to 37 weeks of gestation. It complicates 12 percent of deliveries in the [place], but accounts for over 85 percent of all  morbidity and mortality. Approximately 20 percent of  deliveries are iatrogenic and are performed for maternal or fetal indications, such as intrauterine growth restriction, preeclampsia, placenta previa, or nonreassuring fetal testing. Of the remainder, approximately 30 percent occur in the setting of  premature rupture of the membranes, 20 to 25 percent result from intraamniotic infection, and 25 to 30 percent are due to spontaneous (unexplained)  labor.           While the ability of obstetric care providers to identify women at risk for  birth has improved over the past three decades, prevention of  birth has not been successful and the incidence of  delivery has actually increased.  Fortunately,  outcome has improved during this interval as a result of  widespread use of antepartum corticosteroid administration and advances in  care (eg, exogenous surfactant treatment, new methods of mechanical ventilation).              Risk factors associated with spontaneous  labor and delivery include maternal medical or obstetrical conditions and demographic variables such as age, race, employment, and socioeconomic status.   Multiple births are six times as likely to be  as castaneda births in the [place].    labor can be a complication of infections unrelated to the genital tract, such as appendicitis, cholecystitis, pneumonia, periodontal disease, and pyelonephritis. This likely results from transient low-grade bacteremia and/or endotoxemia leading to systemic inflammatory and immune responses that result in  labor.   Prevention as well as early detection and treatment of some of these types of infection can potentially prevent some  births.  Pregnant women with asymptomatic bacteriuria should be treated with antibiotics to reduce the risk of PTD.                 Maternal substance abuse increases the risk of  birth. Cocaine is the most common illicit substance associated with  labor in the [place], and has been detected in approximately 60 percent of women in  labor who have positive toxicology tests. Alcohol and toluene are additional substances associated with an increased risk of  labor.               Cervical insufficiency complicates 0.1 to 2 percent of all pregnancies, and is estimated to be responsible for approximately 15 percent of late second trimester losses.          A woman with an uncomplicated pregnancy who is employed where there are no greater potential hazards than those encountered in routine daily life may continue to work without interruption until the onset of labor. However, the physical demands of the woman's job should be considered, especially in women at higher  risk of  delivery. A meta-analysis of 21 studies including [id number] women identified a high cumulative work fatigue score as the strongest (OR 1.63) work-related risk factor for  birth.          The highest risk of  birth appears to be with interpregnancy interval of less than six months.       We discussed the morbidity and mortality associated with prematurity at various gestational ages.  The signs and symptoms of  labor were discussed at length with her and her .  We talked about potential risks and benefits associated with tocolytic therapy and  steroid.            We discussed the preventive therapy for  labor and  delivery with weekly 17-OH progesterone caproate weekly beginning between 16-20 weeks of gestation and continuing up to 36 weeks or until delivery. This therapy had been shown to reduce the chance of  births in subsequent pregancies by as much as 33%. It was recommended to patients with history of  delivery , but emerging data have not confirmed the efficacy reported in earlier trials. In a multicenter international trial (PROLONG) including over 1700 women with a castaneda gestation and past history of sPTB, hydroxyprogesterone caproate injections ([place]) did not reduce PTB <35 weeks compared with placebo (11.0 versus 11.5 percent). After review of these findings, a [place] Food and Drug Administration advisory committee recommended withdrawing approval for [place]. Thus, I currently advise intravaginal natural progesterone in patients with a history of sPTB.  Some providers continue to offer hydroxyprogesterone caproate with shared decision-making, while others use intravaginal natural progesterone in patients with a history of sPTB, pending data from additional trials and revised guidance from major obstetric organizations.   Given the potential for cervical insufficiency, cervical surveillance is also advised .    IVF  GESTATION  Conception by IVF is associated with an increased incidence of several obstetrical and  complications. Most of these are related to the high incidence of multiple gestations. The precise reasons for this increase in adverse outcomes are not clear.    ART is associated with an up to two-fold increased risk of  birth and low birth weight in ryan pregnancies. ART does not appear to be an independent risk factor for adverse neurodevelopment outcome. ART appears to be at increased risk of delivering offspring with congenital malformations compared with fertile women who conceive naturally. Heart defects have been reported as high at 6 % so fetal echocardiography is recommended in all IVF patients. Stillbirth and  mortality rates appear to be increased as much as four-fold.    Ryan ART pregnancies, the relative risk of common pregnancy complications such as fetal growth restriction, preeclampsia, prematurity and  mortality are increased.    IMPRESSION:  Preconceptual consult visit - patient not currently pregnant  Pre-Gestational Diabetes  Prior Pre-Viable PROM - a twin gestation - had EAB at [facility]  Possible IVF Gestation    RECOMMENDATIONS:  Continue care with Dr. Trinh  Pre-conceptual folic acid use reviewed  Vaccine history review : all vaccines up to date  Optimize glucoes management with endocrinology prior to conception  When pregnant:  Establish care with OB  12-13 week MFM consultation and NT ultrasound  Cervical length and limite ultrasound at 16, 18, 22 weeks  Level II & repeat cervical length ultrasound at 20 weeks  Four times daily capillary blood glucose assessments (fasting and 2 hour postprandial)  Weekly  review of capillary blood glucose values  Fetal echocardiogram at 24 weeks  Follow-up growth ultrasound every 4 weeks in the third trimester  Weekly NSTs at 32 weeks; twice weekly NST's at 34 weeks  Delivery at 38-39  weeks advised for medication  controlled diabetes  Baseline urine P/C ratio , CMP or AST/ALT/creatinine, TSH  EKG  Ophthalmology exam      Insulin per endocrinology  Aspirin 120mg daily    Thank you for allowing me to participate in the care of your patient.  Please do not hesitate to contact me if additional questions or concerns arise.      Vincent Chino M.D.    55 minutes spent in review of records, patient consultation, documentation and coordination of care.  The relevant clinical matter(s) are summarized above.     Note to patient and family  The 21st Century Cures Act makes medical notes available to patients in the interest of transparency.  However, please be advised that this is a medical document.  It is intended as [id number] communication.  It is written and medical language may contain abbreviations or verbiage that are technical and unfamiliar.  It may appear blunt or direct.  Medical documents are intended to carry relevant information, facts as evident, and the clinical opinion of the practitioner.

## 2024-01-08 ENCOUNTER — HOSPITAL ENCOUNTER (OUTPATIENT)
Age: 34
Discharge: HOME | End: 2024-01-08
Payer: COMMERCIAL

## 2024-01-08 VITALS
HEART RATE: 82 BPM | DIASTOLIC BLOOD PRESSURE: 65 MMHG | OXYGEN SATURATION: 98 % | TEMPERATURE: 98 F | SYSTOLIC BLOOD PRESSURE: 129 MMHG | RESPIRATION RATE: 16 BRPM

## 2024-01-08 DIAGNOSIS — H10.33 ACUTE CONJUNCTIVITIS OF BOTH EYES, UNSPECIFIED ACUTE CONJUNCTIVITIS TYPE: Primary | ICD-10-CM

## 2024-01-08 PROCEDURE — 99213 OFFICE O/P EST LOW 20 MIN: CPT | Performed by: NURSE PRACTITIONER

## 2024-01-08 RX ORDER — TOBRAMYCIN 3 MG/ML
2 SOLUTION/ DROPS OPHTHALMIC EVERY 6 HOURS
Qty: 1 EACH | Refills: 0 | Status: SHIPPED | OUTPATIENT
Start: 2024-01-08 | End: 2024-01-15

## 2024-01-08 NOTE — ED PROVIDER NOTES
Patient Seen in: [facility]      History     Chief Complaint   Patient presents with    Eye Problem     Itchy, running, swollen eyes. - Entered by patient     Stated Complaint: Eye Problem - Itchy, running, swollen eyes.  Subjective:   33-year-old female presents for bilateral eye redness and purulent drainage that started yesterday.  She denies any recent URI symptoms or fevers.  She states she may have caught this from one of her children or .  She is opening closing her eyes without difficulty.  No eye pain.  No change or loss of vision.  She does not wear contacts or glasses.  No lesions near on the eyes.  No eye pain.  No orbital redness, pain, or swelling.  No headaches or dizziness.  She appears well and nontoxic.      HISTORY:  Past Medical History:   Diagnosis Date    Diabetes ([facility])     Gestational diabetes 2017    History of type 2 diabetes mellitus     History of varicella as a child     Infertility, female       Past Surgical History:   Procedure Laterality Date      2017    CHOLECYSTECTOMY      [facility]  SECTION LEVEL I      LASIK Bilateral ~    Dr. Nathan Garcia       Family History   Problem Relation Age of Onset    Glaucoma Father     Diabetes Maternal Grandmother     Diabetes Paternal Grandmother     Macular degeneration Neg       Social History     Socioeconomic History    Marital status:    Tobacco Use    Smoking status: Never    Smokeless tobacco: Never   Substance and Sexual Activity    Alcohol use: No    Drug use: No    Sexual activity: Yes     Partners: Male     Birth control/protection: OCP           Review of Systems   Eyes:  Positive for discharge, redness and itching.   All other systems reviewed and are negative.      Positive for stated complaint: Eye Problem - Itchy, running, swollen eyes.  Other systems are as noted in HPI.  Constitutional and vital signs reviewed.      All other systems reviewed and negative except as noted  above.    Physical Exam     ED Triage Vitals [01/08/24 1005]   /65   Pulse 82   Resp 16   Temp 98.1 °F (36.7 °C)   Temp src Temporal   SpO2 98 %   O2 Device None (Room air)     Current:/65   Pulse 82   Temp 98.1 °F (36.7 °C) (Temporal)   Resp 16   LMP 11/29/2023 (Approximate)   SpO2 98%     Physical Exam  Vitals and nursing note reviewed.   Constitutional:       General: She is not in acute distress.     Appearance: Normal appearance.   HENT:      Head: Normocephalic.      Right Ear: Tympanic membrane normal.      Left Ear: Tympanic membrane normal.      Nose: Nose normal.      Mouth/Throat:      Mouth: Mucous membranes are moist.      Pharynx: Oropharynx is clear. No oropharyngeal exudate or posterior oropharyngeal erythema.   Eyes:      General: Lids are normal.         Right eye: Discharge present.         Left eye: Discharge present.     Extraocular Movements: Extraocular movements intact.      Right eye: Normal extraocular motion.      Left eye: Normal extraocular motion.      Conjunctiva/sclera:      Right eye: Right conjunctiva is injected.      Left eye: Left conjunctiva is injected.   Cardiovascular:      Rate and Rhythm: Normal rate and regular rhythm.      Heart sounds: Normal heart sounds. No murmur heard.  Pulmonary:      Effort: Pulmonary effort is normal.      Breath sounds: Normal breath sounds. No wheezing, rhonchi or rales.   Musculoskeletal:         General: Normal range of motion.      Cervical back: Normal range of motion and neck supple.   Lymphadenopathy:      Cervical: No cervical adenopathy.   Skin:     General: Skin is warm and dry.      Capillary Refill: Capillary refill takes less than 2 seconds.   Neurological:      General: No focal deficit present.      Mental Status: She is alert and oriented to person, place, and time.   Psychiatric:         Mood and Affect: Mood normal.         Behavior: Behavior normal.         ED Course   Labs Reviewed - No data to  display      [facility]     Medical Decision Making  I will treat her for conjunctivitis with tobramycin ophthalmic drops.  We discussed supportive care including frequent handwashing and wiping the drainage from the eyes with warm washcloth.  She will follow-up as needed with ophthalmology.    Risk  Prescription drug management.        Disposition and Plan     Clinical Impression:  1. Acute conjunctivitis of both eyes, unspecified acute conjunctivitis type         Disposition:  Discharge  1/8/2024 10:17 am    Follow-up:  Bobby Leger MD  [address] [place] [id number]  [phone]    Schedule an appointment as soon as possible for a visit   As needed          Medications Prescribed:  Discharge Medication List as of 1/8/2024 10:18 AM        START taking these medications    Details   tobramycin 0.3 % Ophthalmic Solution Place 2 drops into both eyes every 6 (six) hours for 7 days., Normal, Disp-1 each, R-0

## 2024-01-08 NOTE — DISCHARGE INSTRUCTIONS
Wipe any drainage from the eyes with warm washcloth.  Frequent handwashing.  Use the drops as prescribed.  Follow-up as needed with ophthalmology.  Return for any concerns.

## 2024-01-10 ENCOUNTER — HOSPITAL ENCOUNTER (OUTPATIENT)
Dept: PERINATAL CARE | Facility: HOSPITAL | Age: 34
Discharge: HOME | End: 2024-01-10
Attending: OBSTETRICS & GYNECOLOGY
Payer: COMMERCIAL

## 2024-01-10 DIAGNOSIS — O24.319 PREGESTATIONAL DIABETES MELLITUS, MODIFIED WHITE CLASS B: ICD-10-CM

## 2024-01-10 DIAGNOSIS — Z87.59 HISTORY OF PRETERM PREMATURE RUPTURE OF MEMBRANES (PPROM): ICD-10-CM

## 2024-01-10 DIAGNOSIS — Z31.69 ENCOUNTER FOR PRECONCEPTION CONSULTATION: Primary | ICD-10-CM

## 2024-01-11 ENCOUNTER — OFFICE VISIT (OUTPATIENT)
Dept: ENDOCRINOLOGY CLINIC | Facility: CLINIC | Age: 34
End: 2024-01-11
Payer: COMMERCIAL

## 2024-01-11 VITALS
HEART RATE: 80 BPM | WEIGHT: 242 LBS | BODY MASS INDEX: 42.88 KG/M2 | SYSTOLIC BLOOD PRESSURE: 134 MMHG | HEIGHT: 62.99 IN | DIASTOLIC BLOOD PRESSURE: 79 MMHG

## 2024-01-11 DIAGNOSIS — E11.65 UNCONTROLLED TYPE 2 DIABETES MELLITUS WITH HYPERGLYCEMIA (HCC): Primary | ICD-10-CM

## 2024-01-11 LAB
CARTRIDGE LOT#: NORMAL NUMERIC
GLUCOSE BLOOD: 101
HEMOGLOBIN A1C: 5.6 % (ref 4.3–5.6)
TEST STRIP LOT #: NORMAL NUMERIC

## 2024-01-11 PROCEDURE — 82947 ASSAY GLUCOSE BLOOD QUANT: CPT

## 2024-01-11 PROCEDURE — 99214 OFFICE O/P EST MOD 30 MIN: CPT

## 2024-01-11 PROCEDURE — 83036 HEMOGLOBIN GLYCOSYLATED A1C: CPT

## 2024-01-11 PROCEDURE — 3075F SYST BP GE 130 - 139MM HG: CPT

## 2024-01-11 PROCEDURE — 3008F BODY MASS INDEX DOCD: CPT

## 2024-01-11 PROCEDURE — 3078F DIAST BP <80 MM HG: CPT

## 2024-01-11 PROCEDURE — 3044F HG A1C LEVEL LT 7.0%: CPT

## 2024-01-11 NOTE — PROGRESS NOTES
Name: Colette Mantilla Date: 1/11/2024    Referring Physician: No ref. provider found  Dr. Emeli Sagastume - fertility specialist    CHIEF COMPLAINT   Follow up for DM      HISTORY OF PRESENT ILLNESS   Colette Mantilla  is a 31 year old female who presents for follow up on diabetes management.  5/2022 was seen during twin pregnancy which unfortunately did end in loss due to PPROM at 20 weeks.   She is currently undergoing IVF treatments.    Has been maintained on MDI while undergoing fertility treatments to optimize blood sugar control for conception.     FAMILY HISTORY OF DIABETES  -father- DM type 2  -Maternal grandmother- DM type 2  -Paternal grandmother- DM type 2    DIABETES HISTORY  Diagnosed: GDM with prior pregnancy in 2017, progressed to type 2 diabetes controlled with diet.   Prior HbA, C or glycohemoglobin were 6/2018-6.0%; 10/2020- 7.8%; 1/2021- 6.7%; 3/2022- 7.6%; 6.2% 5/2022; 7.2% 1/2023; 5.6% 5/2023; 6.0% 8/2023; 5.6% POC today   Previous DM medications:   Insulin during previous pregnancy  CURRENT MEDICATIONS FOR DM:  NPH- 64 units subcutaneous daily   Novolog- 4-6-22 TID with meals   HOME GLUCOSE READINGS:   Hypoglycemia: Infrequent since last visit- occ. Fasting   Meter or log book today: Checking 3-4 times daily- fasting and post prandial readings  Reviewed for last 2 weeks: She has been sending glucose readings every 3-4 weeks for insulin adjustments as needed in preparation for pregnancy.   Jvolkfg-'s   After breakfast- 's  After lunch- <120's   After dinner-<120's   --> Has pattern of fasting hyperglycemia when starting fertility hormones in preparation for egg retrieval but this typically resolves on it's own after medications completed.   HISTORY OF DIABETES COMPLICATIONS:  History of Retinopathy: No - last eye exam within the last 12 months: Yes 12/2022- is due for follow up with opth.   History of Neuropathy: No, denies symptoms  History of Nephropathy: No  ASSOCIATED  COMPLICATIONS:   HTN: No   Hyperlipidemia: No   Coronary Artery Disease: No  Cerebrovascular Disease: No  Peripheral Vascular Disease: No    DIETARY COMPLIANCE:  Very Good- still watching carbohydrates and incorporating protein in meals   Breakfast- 2 hard boiled eggs with sausage, low carb egg wrap and ezekial bread, or sometimes skipping   Lunch- turkey Hummus wrap with berries   Dinner- side salad with chicken or chickpea pasta   Beverages- water, tea   Snacks- wheat crackers with cheese, yogurt  EXERCISE:   Yes- walking two times daily -elliptical at home, or yoga.     ROS:  Polyuria, polyphagia, polydipsia: no  Paresthesias: no  Blurred vision: no  Recent steroids, illness or infections: No   Osteoporosis- No  Thyroid disease- No  MEDICATIONS:   Current Outpatient Medications on File Prior to Visit   Medication Sig Dispense Refill    Insulin NPH, Human,, Isophane, (NOVOLIN N FLEXPEN) 100 UNIT/ML Subcutaneous Suspension Pen-injector Inject 74 Units into the skin at bedtime. (Patient taking differently: Inject 64 Units into the skin at bedtime.) 69 mL 3    insulin aspart (NOVOLOG FLEXPEN) 100 Units/mL Subcutaneous Solution Pen-injector Inject 4 Units into the skin daily with breakfast AND 6 Units daily with lunch AND 22 Units daily with dinner. 30 mL 0    tobramycin 0.3 % Ophthalmic Solution Place 2 drops into both eyes every 6 (six) hours for 7 days. 1 each 0    drospirenone-ethinyl estradiol 3-0.03 MG Oral Tab TAKE ONE TABLET BY MOUTH EVERY DAY, ACTIVE TABLETS ONLY, SKIP PLACEBO TABLETS (Patient not taking: Reported on 1/10/2024)      estradiol 2 MG Oral Tab TAKE 1 TABLET BY MOUTH BY MOUTH TWICE A DAY (Patient not taking: Reported on 1/10/2024)      folic acid 1 MG Oral Tab Take by mouth daily.      Coenzyme Q10 (COQ-10) 400 MG Oral Cap Take 500 mg by mouth.      acidophilus-pectin Oral Cap Take 1 capsule by mouth daily. FERTILITY PROBIOTIC      Omega-3 Fatty Acids (FISH OIL) 1200 MG Oral Cap Take by mouth.       Glucose Blood (CONTOUR NEXT TEST) In Vitro Strip Use to test blood sugars 4 times daily 400 strip 0    Insulin Pen Needle (BD PEN NEEDLE PARAMJIT 2ND GEN) 32G X 4 MM Does not apply Misc Use to inject insulin three times a day 300 each 0    Microlet Lancets Does not apply Misc Use to test blood sugars 4 times daily 400 each 0    Glucose Blood (ONETOUCH VERIO) In Vitro Strip TEST 4 TIMES A  strip 3    Insulin Pen Needle (BD PEN NEEDLE ORIGINAL U/F) 29G X 12.7MM Does not apply Misc Use to inject insulin 4 times daily 400 each 0    OneTouch Delica Lancets 33G Does not apply Misc 4 x daily 200 each 3    Blood Glucose Monitoring Suppl (ONETOUCH VERIO FLEX SYSTEM) w/Device Does not apply Kit 1 kit in the morning, at noon, in the evening, and at bedtime. May substitute per insurance formulary 1 kit 3    prenatal vitamin w/DHA 27-0.8-228 MG Oral Cap Take 1 capsule by mouth daily.      Vitamin D3, Cholecalciferol, 50 MCG (2000 UT) Oral Tab Take 1 tablet (2,000 Units total) by mouth daily.       No current facility-administered medications on file prior to visit.     ALLERGIES:   No Known Allergies    SOCIAL HISTORY:   Social History     Socioeconomic History    Marital status:      Spouse name: Not on file    Number of children: Not on file    Years of education: Not on file    Highest education level: Not on file   Occupational History    Not on file   Tobacco Use    Smoking status: Never    Smokeless tobacco: Never   Substance and Sexual Activity    Alcohol use: No    Drug use: No    Sexual activity: Yes     Partners: Male     Birth control/protection: OCP   Other Topics Concern    Not on file   Social History Narrative    Not on file     Social Determinants of Health     Financial Resource Strain: Not on file   Food Insecurity: Not on file   Transportation Needs: Not on file   Physical Activity: Not on file   Stress: Not on file   Social Connections: Not on file   Housing Stability: Not on file     PAST  MEDICAL HISTORY:   Past Medical History:   Diagnosis Date    Diabetes (HCC)     Gestational diabetes 2017    History of type 2 diabetes mellitus     History of varicella as a child     Infertility, female        PAST SURGICAL HISTORY:   Past Surgical History:   Procedure Laterality Date      2017    CHOLECYSTECTOMY      HC  SECTION LEVEL I      LASIK Bilateral ~    Dr. Nathan Garcia        PHYSICAL EXAM:   Vitals:    24 1113   BP: 134/79   Pulse: 80         General Appearance:  alert, well developed, in no acute distress  Eyes:  pupils are equal, round and reactive. Normal conjunctivae and normal sclera   Neck: Trachea midline: Normal.   Back: no kyphosis or back tenderness  Lymph Nodes:  No abnormal nodes noted  Musculoskeletal:  normal muscle strength and tone  Skin:  normal moisture and skin texture  Hair & Nails:  normal scalp hair     Hematologic:  no excessive bruising  Neuro:  sensory grossly intact and motor grossly intact  Psychiatric:  oriented to time, self, and place  Nutritional:  no abnormal weight gain or loss     LABS: Pertinent labs reviewed    ASSESSMENT/PLAN:    Diabetes mellitus type 2 uncontrolled  -HgA1c- 5.6% POC today- improved and at goal  -Congratulated patient on continued improved glycemic control   -Reviewed with patient the pathogenesis of diabetes, clinical significance of A1c, and common complications such as: microvascular, macrovascular and diabetic ketoacidosis. Patient verbalizes understanding of the importance of glycemic control and the goals of therapy.   -Discussed with patient glucose targets ranges for preconception and pregnancy (Fasting <90 and 2hr post prandial <120).   -reviewed limiting carbs to 165gm of carbs/day.   -discussed importance of incorporating low carb snacks during the day and at bedtime  -reviewed blood sugar target goals: fasting <90 and 2hrs post prandial <120  -reviewed necessity to send us her BG readings every 3 days for  review during pregnancy  -Discussed dangers of uncontrolled diabetes in pregnancy and possible fetal complications.   -Discussed importance of glycemic control for maternal and fetal health  -Discussed insulin injection site rotation.   -Decrease NPH to 60 units subcutaneous daily. Reviewed insulin timing and administration and risks vs benefits.   - Wed had changed her to 24 hour basal insulin in place of NPH but she had higher fastings despite frequent dose adjustments. Finds fastings improved with NPH.  - Continue Novolog at current doses. Reviewed insulin timing and administration.  -Continue to check sugars 2-4 times daily- for now she can alternate checking fasting and after meals but will need to check 4 times daily after positive pregnancy test and she is aware.   -She will send glucose readings via [employer] in 2 -4weeks as she has been     -normotensive   -normal lipids 1/2023, repeat labs ordered   -elevated urine microalbumin 1/2023- reviewed importance of good glycemic control, repeat labs now  -Reviewed importance of yearly optho follow up - referral for optho provided   -normal foot exam 6/2023    RTC in 6 months, or sooner if positive pregnancy test- understands to call clinic with positive test.   Colette Jaeger, EUSEBIO  1/11/2024  A total of  30 minutes was spent on obtaining history, reviewing pertinent imaging/labs and specialists notes, evaluating patient, providing multiple treatment options, reinforcing diet/exercise and compliance, and completing documentation.

## 2024-01-11 NOTE — PATIENT INSTRUCTIONS
Decrease NPH to - 60 units subcutaneous daily       Continue current doses of Novolog       Labs before next visit

## 2024-01-16 ENCOUNTER — PATIENT MESSAGE (OUTPATIENT)
Dept: INTERNAL MEDICINE CLINIC | Facility: CLINIC | Age: 34
End: 2024-01-16

## 2024-01-16 DIAGNOSIS — Z00.00 ADULT GENERAL MEDICAL EXAM: Primary | ICD-10-CM

## 2024-01-17 NOTE — TELEPHONE ENCOUNTER
Last labs 01/20/23    Future Appointments   Date Time Provider Department Center   2/29/2024 10:30 AM Saulo Owens MD [facility]   7/11/2024 10:45 AM Colette Jaeger APRN [facility]   8/20/2024 10:45 AM Bobby Leger MD [facility]

## 2024-01-19 ENCOUNTER — TELEPHONE (OUTPATIENT)
Dept: ENDOCRINOLOGY CLINIC | Facility: CLINIC | Age: 34
End: 2024-01-19

## 2024-01-19 NOTE — TELEPHONE ENCOUNTER
KEY: [id number]     Tirzepatide (MOUNJARO) 2.5 MG/0.5ML Subcutaneous Solution Pen-injector, Inject 2.5 mg into the skin once a week. For one month and then increase to 5mg subcutaneous weekly, Disp: 2 mL, Rfl: 0

## 2024-01-22 ENCOUNTER — PATIENT MESSAGE (OUTPATIENT)
Dept: ENDOCRINOLOGY CLINIC | Facility: CLINIC | Age: 34
End: 2024-01-22

## 2024-01-25 ENCOUNTER — LAB ENCOUNTER (OUTPATIENT)
Dept: LAB | Facility: HOSPITAL | Age: 34
End: 2024-01-25
Attending: INTERNAL MEDICINE
Payer: COMMERCIAL

## 2024-01-25 DIAGNOSIS — Z00.00 ADULT GENERAL MEDICAL EXAM: ICD-10-CM

## 2024-01-25 DIAGNOSIS — E11.65 UNCONTROLLED TYPE 2 DIABETES MELLITUS WITH HYPERGLYCEMIA (HCC): ICD-10-CM

## 2024-01-25 LAB
ALBUMIN SERPL-MCNC: 4.4 G/DL (ref 3.2–4.8)
ALBUMIN/GLOB SERPL: 1.4 {RATIO} (ref 1–2)
ALP LIVER SERPL-CCNC: 45 U/L
ALT SERPL-CCNC: 10 U/L
ANION GAP SERPL CALC-SCNC: 8 MMOL/L (ref 0–18)
AST SERPL-CCNC: 11 U/L (ref ?–34)
BILIRUB SERPL-MCNC: 0.5 MG/DL (ref 0.3–1.2)
BUN BLD-MCNC: 18 MG/DL (ref 9–23)
BUN/CREAT SERPL: 23.1 (ref 10–20)
CALCIUM BLD-MCNC: 9.6 MG/DL (ref 8.7–10.4)
CHLORIDE SERPL-SCNC: 103 MMOL/L (ref 98–112)
CHOLEST SERPL-MCNC: 185 MG/DL (ref ?–200)
CO2 SERPL-SCNC: 24 MMOL/L (ref 21–32)
CREAT BLD-MCNC: 0.78 MG/DL
CREAT UR-SCNC: 140.2 MG/DL
DEPRECATED RDW RBC AUTO: 42.3 FL (ref 35.1–46.3)
EGFRCR SERPLBLD CKD-EPI 2021: 102 ML/MIN/1.73M2 (ref 60–?)
ERYTHROCYTE [DISTWIDTH] IN BLOOD BY AUTOMATED COUNT: 13.5 % (ref 11–15)
EST. AVERAGE GLUCOSE BLD GHB EST-MCNC: 103 MG/DL (ref 68–126)
FASTING PATIENT LIPID ANSWER: YES
FASTING STATUS PATIENT QL REPORTED: YES
GLOBULIN PLAS-MCNC: 3.1 G/DL (ref 2.8–4.4)
GLUCOSE BLD-MCNC: 105 MG/DL (ref 70–99)
HBA1C MFR BLD: 5.2 % (ref ?–5.7)
HCT VFR BLD AUTO: 40.1 %
HDLC SERPL-MCNC: 54 MG/DL (ref 40–59)
HGB BLD-MCNC: 13.1 G/DL
LDLC SERPL CALC-MCNC: 110 MG/DL (ref ?–100)
MCH RBC QN AUTO: 27.9 PG (ref 26–34)
MCHC RBC AUTO-ENTMCNC: 32.7 G/DL (ref 31–37)
MCV RBC AUTO: 85.3 FL
MICROALBUMIN UR-MCNC: 3.5 MG/DL
MICROALBUMIN/CREAT 24H UR-RTO: 25 UG/MG (ref ?–30)
NONHDLC SERPL-MCNC: 131 MG/DL (ref ?–130)
OSMOLALITY SERPL CALC.SUM OF ELEC: 282 MOSM/KG (ref 275–295)
PLATELET # BLD AUTO: 255 10(3)UL (ref 150–450)
POTASSIUM SERPL-SCNC: 4.2 MMOL/L (ref 3.5–5.1)
PROT SERPL-MCNC: 7.5 G/DL (ref 5.7–8.2)
RBC # BLD AUTO: 4.7 X10(6)UL
SODIUM SERPL-SCNC: 135 MMOL/L (ref 136–145)
TRIGL SERPL-MCNC: 117 MG/DL (ref 30–149)
TSI SER-ACNC: 1.42 MIU/ML (ref 0.55–4.78)
VLDLC SERPL CALC-MCNC: 20 MG/DL (ref 0–30)
WBC # BLD AUTO: 5.6 X10(3) UL (ref 4–11)

## 2024-01-25 PROCEDURE — 36415 COLL VENOUS BLD VENIPUNCTURE: CPT

## 2024-01-25 PROCEDURE — 82570 ASSAY OF URINE CREATININE: CPT

## 2024-01-25 PROCEDURE — 84443 ASSAY THYROID STIM HORMONE: CPT

## 2024-01-25 PROCEDURE — 80061 LIPID PANEL: CPT

## 2024-01-25 PROCEDURE — 80053 COMPREHEN METABOLIC PANEL: CPT

## 2024-01-25 PROCEDURE — 82043 UR ALBUMIN QUANTITATIVE: CPT

## 2024-01-25 PROCEDURE — 83036 HEMOGLOBIN GLYCOSYLATED A1C: CPT

## 2024-01-25 PROCEDURE — 85027 COMPLETE CBC AUTOMATED: CPT

## 2024-01-25 NOTE — TELEPHONE ENCOUNTER
From: Colette Mantilla  To: Colette Jaeger  Sent: 1/22/2024 8:39 AM CST  Subject: Mounjaro     Hello,  I called [facility] and it sounds like the insurance is waiting for your response. I don’t know what they need to authorize mounjaro.

## 2024-01-25 NOTE — TELEPHONE ENCOUNTER
Received fax from [facility] in regards to Mounharo 2.5MG/0.5ML. Medication has been approved from 1/25/24 to 1/25/25. [employer] message was sent to pt and approval letter was sent to scanning.

## 2024-01-29 NOTE — TELEPHONE ENCOUNTER
PA approved: The case has been Approved from [id number]  [facility] sent advising patient

## 2024-02-05 RX ORDER — PEN NEEDLE, DIABETIC 32GX 5/32"
NEEDLE, DISPOSABLE MISCELLANEOUS
Qty: 300 EACH | Refills: 0 | Status: SHIPPED | OUTPATIENT
Start: 2024-02-05

## 2024-02-20 RX ORDER — TIRZEPATIDE 7.5 MG/.5ML
7.5 INJECTION, SOLUTION SUBCUTANEOUS WEEKLY
Qty: 2 ML | Refills: 0 | Status: SHIPPED | OUTPATIENT
Start: 2024-02-20

## 2024-02-20 RX ORDER — TIRZEPATIDE 5 MG/.5ML
5 INJECTION, SOLUTION SUBCUTANEOUS WEEKLY
Qty: 2 ML | Refills: 0 | OUTPATIENT
Start: 2024-02-20

## 2024-02-21 RX ORDER — HUMAN INSULIN 100 [IU]/ML
74 INJECTION, SUSPENSION SUBCUTANEOUS DAILY
Qty: 60 ML | Refills: 0 | Status: SHIPPED | OUTPATIENT
Start: 2024-02-21

## 2024-02-27 RX ORDER — PERPHENAZINE 16 MG/1
TABLET, FILM COATED ORAL
Qty: 400 STRIP | Refills: 0 | Status: SHIPPED | OUTPATIENT
Start: 2024-02-27

## 2024-03-20 ENCOUNTER — PATIENT MESSAGE (OUTPATIENT)
Dept: ENDOCRINOLOGY CLINIC | Facility: CLINIC | Age: 34
End: 2024-03-20

## 2024-03-20 RX ORDER — TIRZEPATIDE 10 MG/.5ML
10 INJECTION, SOLUTION SUBCUTANEOUS WEEKLY
Qty: 2 ML | Refills: 1 | Status: SHIPPED | OUTPATIENT
Start: 2024-03-20 | End: 2024-04-01 | Stop reason: RX

## 2024-03-20 NOTE — TELEPHONE ENCOUNTER
From: Colette Mantilla  To: Colette Jaeger  Sent: 3/20/2024 10:23 AM CDT  Subject: Blood sugar    Hello,  Last Friday was my last dose of 5 mg mounjaro and this Friday I will be taking 7.5 mg. NPH 20 units. Here is the update of my blood sugar. Im not sure if you wanted to send the next mg box of mounjaro or you want me to do two months of 7.5mg.  3/8-89, 92, 95,122  3/9-108, 122, 102,130  3/, 110, 106, 112  3/, 92, 102, 124  3/, 105, 114, 127  3/, 111, 104, 112  3/, 114, 128, 116  3/, 110, 117, 119  3/, 113,101, 93  3/, 109, 98, 99  3/19-96, 90, 101, 112  3/    Thank you,  Colette

## 2024-03-23 NOTE — TELEPHONE ENCOUNTER
LOV: 01/11/2024     Next office visit: 07/11/2024     Last filled: 03/20/2024       Order pended and routed to provider

## 2024-03-25 RX ORDER — TIRZEPATIDE 7.5 MG/.5ML
7.5 INJECTION, SOLUTION SUBCUTANEOUS WEEKLY
Refills: 0 | OUTPATIENT
Start: 2024-03-25

## 2024-04-01 RX ORDER — TIRZEPATIDE 7.5 MG/.5ML
7.5 INJECTION, SOLUTION SUBCUTANEOUS WEEKLY
Qty: 2 ML | Refills: 1 | Status: SHIPPED | OUTPATIENT
Start: 2024-04-01

## 2024-04-02 RX ORDER — TIRZEPATIDE 5 MG/.5ML
5 INJECTION, SOLUTION SUBCUTANEOUS WEEKLY
Qty: 2 ML | Refills: 0 | Status: SHIPPED | OUTPATIENT
Start: 2024-04-02

## 2024-04-17 ENCOUNTER — PATIENT MESSAGE (OUTPATIENT)
Dept: ENDOCRINOLOGY CLINIC | Facility: CLINIC | Age: 34
End: 2024-04-17

## 2024-04-22 ENCOUNTER — PATIENT MESSAGE (OUTPATIENT)
Dept: ENDOCRINOLOGY CLINIC | Facility: CLINIC | Age: 34
End: 2024-04-22

## 2024-04-22 DIAGNOSIS — E11.65 UNCONTROLLED TYPE 2 DIABETES MELLITUS WITH HYPERGLYCEMIA (HCC): Primary | ICD-10-CM

## 2024-04-23 RX ORDER — TIRZEPATIDE 12.5 MG/.5ML
12.5 INJECTION, SOLUTION SUBCUTANEOUS WEEKLY
Qty: 2 ML | Refills: 1 | Status: SHIPPED | OUTPATIENT
Start: 2024-04-23

## 2024-04-23 NOTE — TELEPHONE ENCOUNTER
From: Colette Mantilla  To: Colette Jaeger  Sent: 4/22/2024 8:11 AM CDT  Subject: Urgent 1 box only mounjaro 12.5    Franklin Oneill,  This upcoming Friday I will be at the skilled nursing teresa of my 10mg I just talked to my pharmacy here is [place] and they said there is only one box of 12.5 in all of the area located in [facility] in [facility]. I’m not sure if you just wanted to send the prescription there asap.

## 2024-05-08 RX ORDER — TIRZEPATIDE 15 MG/.5ML
15 INJECTION, SOLUTION SUBCUTANEOUS WEEKLY
Qty: 2 ML | Refills: 1 | Status: SHIPPED | OUTPATIENT
Start: 2024-05-08

## 2024-05-15 ENCOUNTER — PATIENT MESSAGE (OUTPATIENT)
Dept: INTERNAL MEDICINE CLINIC | Facility: CLINIC | Age: 34
End: 2024-05-15

## 2024-05-15 DIAGNOSIS — R73.01 IFG (IMPAIRED FASTING GLUCOSE): Primary | ICD-10-CM

## 2024-05-15 DIAGNOSIS — E11.9 DIET-CONTROLLED DIABETES MELLITUS (HCC): ICD-10-CM

## 2024-05-15 DIAGNOSIS — Z00.00 LABORATORY EXAM ORDERED AS PART OF ROUTINE GENERAL MEDICAL EXAMINATION: ICD-10-CM

## 2024-05-15 NOTE — TELEPHONE ENCOUNTER
From: Colette Mantilla  To: Saulo Owens  Sent: 5/15/2024 9:42 AM CDT  Subject: Bloodwork    Hello,  I was wondering if I should retake my bloodwork again before coming in? When I took the tests I believe in January I was on full blown insulin and taking fertility medicine. As of right now, I am on mounjaro, taking a break from fertility and have lost 34.4 pounds. What would you recommend before I come in at the end of June for my wellness check?    Thank you,  Colette Mantilla

## 2024-05-15 NOTE — TELEPHONE ENCOUNTER
Future Appointments   Date Time Provider Department Center   6/24/2024 10:45 AM [facility]   7/11/2024 10:45 AM Colette Jaeger APRN [facility]   9/17/2024 10:30 [facility]     Pended for review.

## 2024-05-23 NOTE — TELEPHONE ENCOUNTER
Endocrine Refill protocol for Glucose testing supplies       Protocol Criteria:  Appointment with Endocrinology completed in the last 12 months or scheduled in the next 6 months     Verify appointment has been completed or scheduled in the appropriate timeline. If so can send a 90 day supply with 1 refill.        Last completed office visit: 1/11/2024  Next scheduled Follow up: 07/11/24

## 2024-05-30 NOTE — TELEPHONE ENCOUNTER
Endocrine Refill protocol for oral and injectable diabetic medications    Protocol Criteria:    -Appointment with Endocrinology completed in the last 6 months or scheduled in the next 3 months    -A1c result <8.5% in the past 6 months      Verify the above has been completed or scheduled in the appropriate timeline. If so can send a 90 day supply with 1 refill.     Last completed office visit: 1/11/2024   Next scheduled Follow up: 07/11/24     Last A1c result: Last A1c value was 5.2% done 1/25/2024.

## 2024-05-31 RX ORDER — TIRZEPATIDE 12.5 MG/.5ML
12.5 INJECTION, SOLUTION SUBCUTANEOUS WEEKLY
Qty: 2 ML | Refills: 1 | Status: SHIPPED | OUTPATIENT
Start: 2024-05-31 | End: 2024-06-24

## 2024-06-13 ENCOUNTER — LAB ENCOUNTER (OUTPATIENT)
Dept: LAB | Facility: HOSPITAL | Age: 34
End: 2024-06-13
Attending: INTERNAL MEDICINE
Payer: COMMERCIAL

## 2024-06-13 DIAGNOSIS — Z00.00 LABORATORY EXAM ORDERED AS PART OF ROUTINE GENERAL MEDICAL EXAMINATION: ICD-10-CM

## 2024-06-13 DIAGNOSIS — R73.01 IFG (IMPAIRED FASTING GLUCOSE): ICD-10-CM

## 2024-06-13 LAB
ALBUMIN SERPL-MCNC: 4.5 G/DL (ref 3.2–4.8)
ALBUMIN/GLOB SERPL: 1.6 {RATIO} (ref 1–2)
ALP LIVER SERPL-CCNC: 52 U/L
ALT SERPL-CCNC: 30 U/L
ANION GAP SERPL CALC-SCNC: 6 MMOL/L (ref 0–18)
AST SERPL-CCNC: 14 U/L (ref ?–34)
BILIRUB SERPL-MCNC: 0.5 MG/DL (ref 0.3–1.2)
BUN BLD-MCNC: 11 MG/DL (ref 9–23)
BUN/CREAT SERPL: 13.9 (ref 10–20)
CALCIUM BLD-MCNC: 9.5 MG/DL (ref 8.7–10.4)
CHLORIDE SERPL-SCNC: 110 MMOL/L (ref 98–112)
CHOLEST SERPL-MCNC: 160 MG/DL (ref ?–200)
CO2 SERPL-SCNC: 24 MMOL/L (ref 21–32)
CREAT BLD-MCNC: 0.79 MG/DL
CREAT UR-SCNC: 150.3 MG/DL
EGFRCR SERPLBLD CKD-EPI 2021: 100 ML/MIN/1.73M2 (ref 60–?)
EST. AVERAGE GLUCOSE BLD GHB EST-MCNC: 120 MG/DL (ref 68–126)
FASTING PATIENT LIPID ANSWER: YES
FASTING STATUS PATIENT QL REPORTED: YES
GLOBULIN PLAS-MCNC: 2.8 G/DL (ref 2–3.5)
GLUCOSE BLD-MCNC: 106 MG/DL (ref 70–99)
HBA1C MFR BLD: 5.8 % (ref ?–5.7)
HDLC SERPL-MCNC: 38 MG/DL (ref 40–59)
LDLC SERPL CALC-MCNC: 108 MG/DL (ref ?–100)
MICROALBUMIN UR-MCNC: 2.8 MG/DL
MICROALBUMIN/CREAT 24H UR-RTO: 18.6 UG/MG (ref ?–30)
NONHDLC SERPL-MCNC: 122 MG/DL (ref ?–130)
OSMOLALITY SERPL CALC.SUM OF ELEC: 290 MOSM/KG (ref 275–295)
POTASSIUM SERPL-SCNC: 4.6 MMOL/L (ref 3.5–5.1)
PROT SERPL-MCNC: 7.3 G/DL (ref 5.7–8.2)
SODIUM SERPL-SCNC: 140 MMOL/L (ref 136–145)
TRIGL SERPL-MCNC: 70 MG/DL (ref 30–149)
TSI SER-ACNC: 0.84 MIU/ML (ref 0.55–4.78)
VLDLC SERPL CALC-MCNC: 12 MG/DL (ref 0–30)

## 2024-06-13 PROCEDURE — 83036 HEMOGLOBIN GLYCOSYLATED A1C: CPT

## 2024-06-13 PROCEDURE — 80053 COMPREHEN METABOLIC PANEL: CPT

## 2024-06-13 PROCEDURE — 84443 ASSAY THYROID STIM HORMONE: CPT

## 2024-06-13 PROCEDURE — 36415 COLL VENOUS BLD VENIPUNCTURE: CPT

## 2024-06-13 PROCEDURE — 80061 LIPID PANEL: CPT

## 2024-06-13 PROCEDURE — 82570 ASSAY OF URINE CREATININE: CPT

## 2024-06-13 PROCEDURE — 82043 UR ALBUMIN QUANTITATIVE: CPT

## 2024-06-24 ENCOUNTER — OFFICE VISIT (OUTPATIENT)
Dept: INTERNAL MEDICINE CLINIC | Facility: CLINIC | Age: 34
End: 2024-06-24

## 2024-06-24 VITALS
BODY MASS INDEX: 36.14 KG/M2 | SYSTOLIC BLOOD PRESSURE: 126 MMHG | HEIGHT: 62.99 IN | OXYGEN SATURATION: 100 % | HEART RATE: 84 BPM | DIASTOLIC BLOOD PRESSURE: 78 MMHG | WEIGHT: 204 LBS

## 2024-06-24 DIAGNOSIS — Z00.00 ADULT GENERAL MEDICAL EXAM: Primary | ICD-10-CM

## 2024-06-24 DIAGNOSIS — E11.9 TYPE 2 DIABETES MELLITUS WITHOUT COMPLICATION, WITH LONG-TERM CURRENT USE OF INSULIN (HCC): ICD-10-CM

## 2024-06-24 DIAGNOSIS — Z79.4 TYPE 2 DIABETES MELLITUS WITHOUT COMPLICATION, WITH LONG-TERM CURRENT USE OF INSULIN (HCC): ICD-10-CM

## 2024-06-24 PROBLEM — H92.02 LEFT EAR PAIN: Status: RESOLVED | Noted: 2023-11-20 | Resolved: 2024-06-24

## 2024-06-24 PROCEDURE — 3074F SYST BP LT 130 MM HG: CPT | Performed by: INTERNAL MEDICINE

## 2024-06-24 PROCEDURE — 3078F DIAST BP <80 MM HG: CPT | Performed by: INTERNAL MEDICINE

## 2024-06-24 PROCEDURE — 99395 PREV VISIT EST AGE 18-39: CPT | Performed by: INTERNAL MEDICINE

## 2024-06-24 PROCEDURE — 3061F NEG MICROALBUMINURIA REV: CPT | Performed by: INTERNAL MEDICINE

## 2024-06-24 PROCEDURE — 3044F HG A1C LEVEL LT 7.0%: CPT | Performed by: INTERNAL MEDICINE

## 2024-06-24 PROCEDURE — 3008F BODY MASS INDEX DOCD: CPT | Performed by: INTERNAL MEDICINE

## 2024-06-24 RX ORDER — TIRZEPATIDE 12.5 MG/.5ML
INJECTION, SOLUTION SUBCUTANEOUS
Qty: 2 ML | Refills: 1 | Status: SHIPPED | OUTPATIENT
Start: 2024-06-24

## 2024-06-24 NOTE — TELEPHONE ENCOUNTER
Endocrine Refill protocol for oral and injectable diabetic medications    Protocol Criteria:pass    -Appointment with Endocrinology completed in the last 6 months or scheduled in the next 3 months    -A1c result <8.5% in the past 6 months      Verify the above has been completed or scheduled in the appropriate timeline. If so can send a 90 day supply with 1 refill.     Last completed office visit: 1/11/2024 Colette Jaeger APRN   Next scheduled Follow up: 07/11/24     Last A1c result: Last A1c value was 5.8% done 6/13/2024.

## 2024-06-24 NOTE — PROGRESS NOTES
Colette Mantilla is a 33 year old female.  Chief Complaint   Patient presents with    Physical    Finger Pain     Has been having tingling feeling in fingers on left hand    Hair/Scalp Problem     Noticed hair loss since starting mounjaro, stopping mounjaro in july     HPI:   33-year-old pleasant lady here for physical.  She reports that she noticed some hair loss after being on Mounjaro.  Significant weight loss with Mounjaro.  She is going to see endocrinology in couple of weeks.  She also reports that she developed some tingling in the hands after sleeping.  He is getting better now.    Past medical history DM     Past surgical history  C section, cholecystectomy     She is not allergic to medication.     SHe does not smoke, denies alcohol or recreational drug abuse.     Family history-she denies any family history of colon cancer or  Breast cancer   Her dad had lung cancer from smoking.  She denies any premature heart disease or venous thromboembolic disease.     She is  and has 1 kid.      Current Outpatient Medications   Medication Sig Dispense Refill    CONTOUR NEXT TEST In Vitro Strip USE TO TEST BLOOD SUGARS 4 TIMES DAILY 400 strip 0    Tirzepatide (MOUNJARO) 12.5 MG/0.5ML Subcutaneous Solution Pen-injector Inject 12.5 mg into the skin once a week. 2 mL 1    folic acid 1 MG Oral Tab Take by mouth daily.      acidophilus-pectin Oral Cap Take 1 capsule by mouth daily. FERTILITY PROBIOTIC      Omega-3 Fatty Acids (FISH OIL) 1200 MG Oral Cap Take by mouth.      Glucose Blood (ONETOUCH VERIO) In Vitro Strip TEST 4 TIMES A  strip 3    prenatal vitamin w/DHA 27-0.8-228 MG Oral Cap Take 1 capsule by mouth daily.      Vitamin D3, Cholecalciferol, 50 MCG (2000 UT) Oral Tab Take 1 tablet (2,000 Units total) by mouth daily.      Tirzepatide (MOUNJARO) 15 MG/0.5ML Subcutaneous Solution Pen-injector Inject 15 mg into the skin once a week. (Patient not taking: Reported on 6/24/2024) 2 mL 1    Insulin Pen Needle  (BD PEN NEEDLE PARAMJIT 2ND GEN) 32G X 4 MM Does not apply Misc Use to inject insulin three times a day (Patient not taking: Reported on 2024) 300 each 0    drospirenone-ethinyl estradiol 3-0.03 MG Oral Tab TAKE ONE TABLET BY MOUTH EVERY DAY, ACTIVE TABLETS ONLY, SKIP PLACEBO TABLETS (Patient not taking: Reported on 1/10/2024)      estradiol 2 MG Oral Tab TAKE 1 TABLET BY MOUTH BY MOUTH TWICE A DAY (Patient not taking: Reported on 2024)      Microlet Lancets Does not apply Misc Use to test blood sugars 4 times daily (Patient not taking: Reported on 2024) 400 each 0    Insulin Pen Needle (BD PEN NEEDLE ORIGINAL U/F) 29G X 12.7MM Does not apply Misc Use to inject insulin 4 times daily (Patient not taking: Reported on 2024) 400 each 0    OneTouch Delica Lancets 33G Does not apply Misc 4 x daily (Patient not taking: Reported on 2024) 200 each 3    Blood Glucose Monitoring Suppl (ONETOUCH VERIO FLEX SYSTEM) w/Device Does not apply Kit 1 kit in the morning, at noon, in the evening, and at bedtime. May substitute per insurance formulary (Patient not taking: Reported on 2024) 1 kit 3      Past Medical History:    Diabetes (HCC)    Gestational diabetes (HCC)    History of type 2 diabetes mellitus    History of varicella as a child    Infertility, female      Past Surgical History:   Procedure Laterality Date      2017    Cholecystectomy      Hc  section level i      Lasik Bilateral ~    Dr. Nathan Garcia       Social History:  Social History     Socioeconomic History    Marital status:    Tobacco Use    Smoking status: Never    Smokeless tobacco: Never   Substance and Sexual Activity    Alcohol use: No    Drug use: No    Sexual activity: Yes     Partners: Male     Birth control/protection: OCP     Social Determinants of Health      Received from [facility]      Family History   Problem Relation Age of Onset    Glaucoma Father     Diabetes Maternal  Grandmother     Diabetes Paternal Grandmother     Macular degeneration Neg       No Known Allergies     REVIEW OF SYSTEMS:   Review of Systems   Review of Systems   Constitutional: Negative for activity change, appetite change and fever.   HENT: Negative for congestion and voice change.    Respiratory: Negative for cough and shortness of breath.    Cardiovascular: Negative for chest pain.   Gastrointestinal: Negative for abdominal distention, abdominal pain and vomiting.   Genitourinary: Negative for hematuria.   Skin: Negative for wound.   Psychiatric/Behavioral: Negative for behavioral problems.   Wt Readings from Last 5 Encounters:   06/24/24 204 lb (92.5 kg)   01/11/24 242 lb (109.8 kg)   12/05/23 242 lb 3.2 oz (109.9 kg)   11/20/23 240 lb (108.9 kg)   08/11/23 246 lb (111.6 kg)     Body mass index is 36.15 kg/m².      EXAM:   /78   Pulse 84   Ht 5' 2.99\" (1.6 m)   Wt 204 lb (92.5 kg)   LMP 06/17/2024 (Approximate)   SpO2 100%   BMI 36.15 kg/m²   Physical Exam   Constitutional:       Appearance: Normal appearance.   HENT:      Head: Normocephalic.   Eyes:      Conjunctiva/sclera: Conjunctivae normal.   Cardiovascular:      Rate and Rhythm: Normal rate and regular rhythm.      Heart sounds: Normal heart sounds. No murmur heard.  Pulmonary:      Effort: Pulmonary effort is normal.      Breath sounds: Normal breath sounds. No rhonchi or rales.   Abdominal:      General: Bowel sounds are normal.      Palpations: Abdomen is soft.      Tenderness: There is no abdominal tenderness.   Musculoskeletal:      Cervical back: Neck supple.      Right lower leg: No edema.      Left lower leg: No edema.   Skin:     General: Skin is warm and dry.   Neurological:      General: No focal deficit present.      Mental Status: He is alert and oriented to person, place, and time. Mental status is at baseline.   Psychiatric:         Mood and Affect: Mood normal.         Behavior: Behavior normal.       ASSESSMENT AND PLAN:    1. Adult general medical exam  Encouraged patient to eat healthy.  Fruits and vegetables.  Exercise as tolerated.  She is trying to get pregnant.  I have informed her that she should notify endocrinology regarding Mounjaro.  Continue to follow-up with gynecology.  Labs reviewed.    2. Type 2 diabetes mellitus without complication, with long-term current use of insulin (HCC)  Excellent A1c.  As her current ophthalmologist is retiring, I have given her referral to see new ophthalmology.  - Ophthalmology Referral - External    Plan: As above.      The patient indicates understanding of these issues and agrees to the plan.  No follow-ups on file.    This note was prepared using Dragon [employer] voice recognition dictation software. As a result errors may occur. When identified these errors have been corrected. While every attempt is made to correct errors during dictation discrepancies may still exist.

## 2024-07-11 ENCOUNTER — OFFICE VISIT (OUTPATIENT)
Dept: ENDOCRINOLOGY CLINIC | Facility: CLINIC | Age: 34
End: 2024-07-11
Payer: COMMERCIAL

## 2024-07-11 VITALS
DIASTOLIC BLOOD PRESSURE: 73 MMHG | SYSTOLIC BLOOD PRESSURE: 115 MMHG | BODY MASS INDEX: 36.68 KG/M2 | WEIGHT: 207 LBS | HEART RATE: 80 BPM | HEIGHT: 62.99 IN

## 2024-07-11 DIAGNOSIS — E11.65 UNCONTROLLED TYPE 2 DIABETES MELLITUS WITH HYPERGLYCEMIA (HCC): Primary | ICD-10-CM

## 2024-07-11 LAB
GLUCOSE BLOOD: 121
TEST STRIP LOT #: NORMAL NUMERIC

## 2024-07-11 PROCEDURE — 3008F BODY MASS INDEX DOCD: CPT

## 2024-07-11 PROCEDURE — 99214 OFFICE O/P EST MOD 30 MIN: CPT

## 2024-07-11 PROCEDURE — 82947 ASSAY GLUCOSE BLOOD QUANT: CPT

## 2024-07-11 PROCEDURE — 3078F DIAST BP <80 MM HG: CPT

## 2024-07-11 PROCEDURE — 3074F SYST BP LT 130 MM HG: CPT

## 2024-07-11 NOTE — PROGRESS NOTES
Name: Colette Mantilla Date: 7/11/24  Referring Physician: No ref. provider found  Dr. Emeli Sagastume - fertility specialist    CHIEF COMPLAINT   Follow up for DM      HISTORY OF PRESENT ILLNESS   Colette Mantilla  is a 31 year old female who presents for follow up on diabetes management.  5/2022 was seen during twin pregnancy which unfortunately did end in loss due to PPROM at 20 weeks.   Fertility treatments have been on hold for the last 6 months. She was transitioned off insulin and we started Mounjaro to help with glycemic control and weight loss in hopes of resuming fertility treatments at the end of the summer.   Feeling very well on Mounjaro and has been able to lose about 40 lbs.   FAMILY HISTORY OF DIABETES  -father- DM type 2  -Maternal grandmother- DM type 2  -Paternal grandmother- DM type 2    DIABETES HISTORY  Diagnosed: GDM with prior pregnancy in 2017, progressed to type 2 diabetes controlled with diet.   Prior HbA, C or glycohemoglobin were 6/2018-6.0%; 10/2020- 7.8%; 1/2021- 6.7%; 3/2022- 7.6%; 6.2% 5/2022; 7.2% 1/2023; 5.6% 5/2023; 6.0% 8/2023; 5.6% 1/2024; 5.8% 6/2024   Previous DM medications:   Insulin during previous pregnancy  CURRENT MEDICATIONS FOR DM:  Mounjaro 12.5 - has completed last month of prescription  HOME GLUCOSE READINGS:   Hypoglycemia: Infrequent since last visit- occ. Fasting   Meter or log book today: Checking 3-4 times daily- fasting and post prandial readings  Per recall:  Vqkopkj-'s   Post prandial-    HISTORY OF DIABETES COMPLICATIONS:  History of Retinopathy: No - last eye exam within the last 12 months: Yes- 12/2023 Had appt with Dr. Blackburn next month   History of Neuropathy: No, denies symptoms  History of Nephropathy: No  ASSOCIATED COMPLICATIONS:   HTN: No   Hyperlipidemia: No   Coronary Artery Disease: No  Cerebrovascular Disease: No  Peripheral Vascular Disease: No    DIETARY COMPLIANCE:  Very Good- still watching carbohydrates and incorporating protein  in meals   Breakfast- 2 hard boiled eggs with sausage, low carb egg wrap and ezekial bread, or sometimes skipping   Lunch- turkey Hummus wrap with berries   Dinner- side salad with chicken or chickpea pasta   Beverages- water, tea   Snacks- wheat crackers with cheese, yogurt  EXERCISE:   Yes- walking two times daily -elliptical at home, or yoga. Hiking, walking, and swimming during summer months    ROS:  Polyuria, polyphagia, polydipsia: no  Paresthesias: no, denies symptoms   Blurred vision: no  Recent steroids, illness or infections: No   Osteoporosis- No  Thyroid disease- No  MEDICATIONS:   Current Outpatient Medications on File Prior to Visit   Medication Sig Dispense Refill    MOUNJARO 12.5 MG/0.5ML Subcutaneous Solution Pen-injector INJECT 12.5 MG SUBCUTANEOUSLY ONE TIME PER WEEK 2 mL 1    CONTOUR NEXT TEST In Vitro Strip USE TO TEST BLOOD SUGARS 4 TIMES DAILY 400 strip 0    Insulin Pen Needle (BD PEN NEEDLE PARAMJIT 2ND GEN) 32G X 4 MM Does not apply Misc Use to inject insulin three times a day 300 each 0    drospirenone-ethinyl estradiol 3-0.03 MG Oral Tab       estradiol 2 MG Oral Tab       folic acid 1 MG Oral Tab Take by mouth daily.      acidophilus-pectin Oral Cap Take 1 capsule by mouth daily. FERTILITY PROBIOTIC      Omega-3 Fatty Acids (FISH OIL) 1200 MG Oral Cap Take by mouth.      Microlet Lancets Does not apply Misc Use to test blood sugars 4 times daily 400 each 0    Glucose Blood (ONETOUCH VERIO) In Vitro Strip TEST 4 TIMES A  strip 3    Insulin Pen Needle (BD PEN NEEDLE ORIGINAL U/F) 29G X 12.7MM Does not apply Misc Use to inject insulin 4 times daily 400 each 0    OneTouch Delica Lancets 33G Does not apply Misc 4 x daily 200 each 3    Blood Glucose Monitoring Suppl (ONETOUCH VERIO FLEX SYSTEM) w/Device Does not apply Kit 1 kit in the morning, at noon, in the evening, and at bedtime. May substitute per insurance formulary 1 kit 3    prenatal vitamin w/DHA 27-0.8-228 MG Oral Cap Take 1  capsule by mouth daily.      Vitamin D3, Cholecalciferol, 50 MCG (2000 UT) Oral Tab Take 1 tablet (2,000 Units total) by mouth daily.      Tirzepatide (MOUNJARO) 15 MG/0.5ML Subcutaneous Solution Pen-injector Inject 15 mg into the skin once a week. (Patient not taking: Reported on 2024) 2 mL 1     No current facility-administered medications on file prior to visit.     ALLERGIES:   No Known Allergies    SOCIAL HISTORY:   Social History     Socioeconomic History    Marital status:      Spouse name: Not on file    Number of children: Not on file    Years of education: Not on file    Highest education level: Not on file   Occupational History    Not on file   Tobacco Use    Smoking status: Never    Smokeless tobacco: Never   Substance and Sexual Activity    Alcohol use: No    Drug use: No    Sexual activity: Yes     Partners: Male     Birth control/protection: OCP   Other Topics Concern    Not on file   Social History Narrative    Not on file     Social Determinants of Health     Financial Resource Strain: Not on file   Food Insecurity: Not on file   Transportation Needs: Not on file   Physical Activity: Not on file   Stress: Not on file   Social Connections: Not on file   Housing Stability: At Risk (2023)    Received from [facility]     Living Situation: Not on file     Housing Problems: Not on file     PAST MEDICAL HISTORY:   Past Medical History:    Diabetes (HCC)    Gestational diabetes (HCC)    History of type 2 diabetes mellitus    History of varicella as a child    Infertility, female       PAST SURGICAL HISTORY:   Past Surgical History:   Procedure Laterality Date      2017    Cholecystectomy      Hc  section level i      Lasik Bilateral ~    Dr. Nathan Garcia        PHYSICAL EXAM:   Vitals:    24 1044   BP: 115/73   Pulse: 80       General Appearance:  alert, well developed, in no acute distress  Eyes:  pupils are equal, round and reactive. Normal  conjunctivae and normal sclera   Neck: Trachea midline: Normal.   Back: no kyphosis or back tenderness  Lymph Nodes:  No abnormal nodes noted  Musculoskeletal:  normal muscle strength and tone  Skin:  normal moisture and skin texture  Hair & Nails:  normal scalp hair     Hematologic:  no excessive bruising  Neuro:  sensory grossly intact and motor grossly intact  Psychiatric:  oriented to time, self, and place  Nutritional:  no abnormal weight gain or loss   Feet: Bilateral barefoot skin diabetic exam is normal, visualized feet and the appearance is normal.  Bilateral monofilament/sensation of both feet is normal.  Pulsation pedal pulse exam of both lower legs/feet is normal as well.      LABS: Pertinent labs reviewed    ASSESSMENT/PLAN:    Diabetes mellitus type 2 controlled  -HgA1c- 5.8% 6/2024  -Congratulated patient on continued improved glycemic control   -Reviewed with patient the pathogenesis of diabetes, clinical significance of A1c, and common complications such as: microvascular, macrovascular and diabetic ketoacidosis. Patient verbalizes understanding of the importance of glycemic control and the goals of therapy.   -Discussed with patient glucose targets ranges for preconception and pregnancy (Fasting <90 and 2hr post prandial <120).   -reviewed limiting carbs to 165gm of carbs/day.   -discussed importance of incorporating low carb snacks during the day and at bedtime  -reviewed blood sugar target goals: fasting <90 and 2hrs post prandial <120  -reviewed necessity to send us her BG readings every 3 days for review during pregnancy  -Discussed dangers of uncontrolled diabetes in pregnancy and possible fetal complications.   -Discussed importance of glycemic control for maternal and fetal health  -Discussed insulin injection site rotation.   - Will hold off on starting insulin at this time- Has finished Mounjaro and will plant to restart fertility treatments next month. Continue to monitor sugars and call  if fasting sugars persistently >120 or post prandial sugars persistently >140's.   -Continue to check sugars 2-4 times daily- for now she can alternate checking fasting and after meals but will need to check 4 times daily after positive pregnancy test and she is aware.   -She will send glucose readings via [employer] in 2 -4 weeks as she has been   -normotensive   -normal lipids 6/2024- LDL slightly elevated at 108.   -no nephropathy 6/2024  -Reviewed importance of yearly optho follow up - has appt scheduled.   -normal foot exam today     RTC in 6 months, or sooner if positive pregnancy test- understands to call clinic with positive test.   Colette Jaeger, APRN  7/11/24  A total of  30 minutes was spent on obtaining history, reviewing pertinent imaging/labs and specialists notes, evaluating patient, providing multiple treatment options, reinforcing diet/exercise and compliance, and completing documentation.

## 2024-09-09 ENCOUNTER — PATIENT MESSAGE (OUTPATIENT)
Dept: INTERNAL MEDICINE CLINIC | Facility: CLINIC | Age: 34
End: 2024-09-09

## 2024-09-09 DIAGNOSIS — N97.9 FEMALE INFERTILITY: Primary | ICD-10-CM

## 2024-09-10 NOTE — TELEPHONE ENCOUNTER
From: Colette Mantilla  To: Saulo Owens  Sent: 9/9/2024 4:10 PM CDT  Subject: [facility]     Hello,  Supposedly my insurance for ivf has faxed to Dr Owens. My case has been closed for my prior ivf cycle. They need another referral form along with the supporting clinical documentation to [facility] for review and processing. Any info needed call [facility] at [phone]. My fertility Dr is Dr Janett Best.

## 2024-09-13 ENCOUNTER — TELEPHONE (OUTPATIENT)
Dept: ADMINISTRATIVE | Age: 34
End: 2024-09-13

## 2024-09-13 NOTE — TELEPHONE ENCOUNTER
Hello     This patient currently has an approved  reproductive Endocrinology referral on file.  Please see referral# [id number].   This referral will be closed.   Thank you  Beryl

## 2025-01-16 ENCOUNTER — OFFICE VISIT (OUTPATIENT)
Dept: ENDOCRINOLOGY CLINIC | Facility: CLINIC | Age: 35
End: 2025-01-16
Payer: COMMERCIAL

## 2025-01-16 VITALS
BODY MASS INDEX: 37.35 KG/M2 | WEIGHT: 210.81 LBS | HEIGHT: 62.99 IN | HEART RATE: 76 BPM | SYSTOLIC BLOOD PRESSURE: 125 MMHG | DIASTOLIC BLOOD PRESSURE: 67 MMHG

## 2025-01-16 DIAGNOSIS — E11.9 CONTROLLED TYPE 2 DIABETES MELLITUS WITHOUT COMPLICATION, UNSPECIFIED WHETHER LONG TERM INSULIN USE (HCC): Primary | ICD-10-CM

## 2025-01-16 LAB
GLUCOSE BLOOD: 128
HEMOGLOBIN A1C: 6.2 % (ref 4.3–5.6)
TEST STRIP LOT #: NORMAL NUMERIC

## 2025-01-16 PROCEDURE — 99214 OFFICE O/P EST MOD 30 MIN: CPT

## 2025-01-16 PROCEDURE — 3074F SYST BP LT 130 MM HG: CPT

## 2025-01-16 PROCEDURE — 3078F DIAST BP <80 MM HG: CPT

## 2025-01-16 PROCEDURE — 3008F BODY MASS INDEX DOCD: CPT

## 2025-01-16 PROCEDURE — 83036 HEMOGLOBIN GLYCOSYLATED A1C: CPT

## 2025-01-16 PROCEDURE — 82947 ASSAY GLUCOSE BLOOD QUANT: CPT

## 2025-01-16 PROCEDURE — 3044F HG A1C LEVEL LT 7.0%: CPT

## 2025-01-16 NOTE — PATIENT INSTRUCTIONS
Fasting - 95 or below     Post prandial - <120    Start NPH - 10 units subcutaneous daily     Send me glucose readings in 1 week (and weekly after that on Wed or Thursdays)    Schedule follow up in one month

## 2025-01-16 NOTE — PROGRESS NOTES
Name: Colette Mantilla Date: 1/16/25  Referring Physician: No ref. provider found  Dr. Emeli Sagastume - fertility specialist    CHIEF COMPLAINT   Follow up for DM      HISTORY OF PRESENT ILLNESS   Colette Mantilla  is a 31 year old female who presents for follow up on diabetes management.  5/2022 was seen during twin pregnancy which unfortunately did end in loss due to PPROM at 20 weeks.   Fertility treatments have been on hold for the last 6 months. She was transitioned off insulin and we started Mounjaro to help with glycemic control and weight loss in hopes of resuming fertility treatments.   Has resumed fertility treatments- underwent embryo transfer this month- has follow up with IVF specialist today to see if transfer was successful but does report positive home pregnancy test today.   Was able to lose around 45 lbs with Mounjaro therapy prior to resuming fertility treatment.     FAMILY HISTORY OF DIABETES  -father- DM type 2  -Maternal grandmother- DM type 2  -Paternal grandmother- DM type 2    DIABETES HISTORY  Diagnosed: GDM with prior pregnancy in 2017, progressed to type 2 diabetes controlled with diet.   Prior HbA, C or glycohemoglobin were 6/2018-6.0%; 10/2020- 7.8%; 1/2021- 6.7%; 3/2022- 7.6%; 6.2% 5/2022; 7.2% 1/2023; 5.6% 5/2023; 6.0% 8/2023; 5.6% 1/2024; 5.8% 6/2024; 6.2% POC today   Previous DM medications:   Insulin during previous pregnancy  Mounjaro 12.5mg subcutaneous weekly- now stopped since resuming fertility treatment    CURRENT MEDICATIONS FOR DM:  -Off DM medications at this time     HOME GLUCOSE READINGS:   Hypoglycemia: Infrequent since last visit- occ. Fasting   Meter or log book today: Checking 3-4 times daily- fasting and post prandial readings  Per recall:  Fasting- 100-150's   Post prandial- 100-120's   HISTORY OF DIABETES COMPLICATIONS:  History of Retinopathy: No - last eye exam within the last 12 months: Yes- 9/2024 Had appt with Dr. Blackburn next month   History of Neuropathy: No,  denies symptoms  History of Nephropathy: No  ASSOCIATED COMPLICATIONS:   HTN: No   Hyperlipidemia: No   Coronary Artery Disease: No  Cerebrovascular Disease: No  Peripheral Vascular Disease: No    DIETARY COMPLIANCE:  Very Good- still watching carbohydrates and incorporating protein in meals   Breakfast- 2 hard boiled eggs with sausage, low carb egg wrap and ezekial bread, or sometimes skipping   Lunch- turkey Hummus wrap with berries   Dinner- side salad with chicken or chickpea pasta   Beverages- water, tea   Snacks- wheat crackers with cheese, yogurt  EXERCISE:   Yes- walking two times daily - doing some exercise at home.     ROS:  Polyuria, polyphagia, polydipsia: no  Paresthesias: no, denies symptoms   Blurred vision: no  Recent steroids, illness or infections: No   Osteoporosis- No  Thyroid disease- No  MEDICATIONS:   Current Outpatient Medications on File Prior to Visit   Medication Sig Dispense Refill    Omega-3 Fatty Acids (FISH OIL) 1200 MG Oral Cap Take by mouth.      prenatal vitamin w/DHA 27-0.8-228 MG Oral Cap Take 1 capsule by mouth daily.      Vitamin D3, Cholecalciferol, 50 MCG (2000 UT) Oral Tab Take 1 tablet (2,000 Units total) by mouth daily.      MOUNJARO 12.5 MG/0.5ML Subcutaneous Solution Pen-injector INJECT 12.5 MG SUBCUTANEOUSLY ONE TIME PER WEEK (Patient not taking: Reported on 1/16/2025) 2 mL 1    CONTOUR NEXT TEST In Vitro Strip USE TO TEST BLOOD SUGARS 4 TIMES DAILY 400 strip 0    Tirzepatide (MOUNJARO) 15 MG/0.5ML Subcutaneous Solution Pen-injector Inject 15 mg into the skin once a week. (Patient not taking: Reported on 1/16/2025) 2 mL 1    Insulin Pen Needle (BD PEN NEEDLE PARAMJIT 2ND GEN) 32G X 4 MM Does not apply Misc Use to inject insulin three times a day 300 each 0    drospirenone-ethinyl estradiol 3-0.03 MG Oral Tab       estradiol 2 MG Oral Tab       folic acid 1 MG Oral Tab Take by mouth daily.      acidophilus-pectin Oral Cap Take 1 capsule by mouth daily. FERTILITY PROBIOTIC       Microlet Lancets Does not apply Misc Use to test blood sugars 4 times daily 400 each 0    Glucose Blood (ONETOUCH VERIO) In Vitro Strip TEST 4 TIMES A  strip 3    Insulin Pen Needle (BD PEN NEEDLE ORIGINAL U/F) 29G X 12.7MM Does not apply Misc Use to inject insulin 4 times daily 400 each 0    OneTouch Delica Lancets 33G Does not apply Misc 4 x daily 200 each 3    Blood Glucose Monitoring Suppl (ONETOUCH VERIO FLEX SYSTEM) w/Device Does not apply Kit 1 kit in the morning, at noon, in the evening, and at bedtime. May substitute per insurance formulary 1 kit 3     No current facility-administered medications on file prior to visit.     ALLERGIES:   No Known Allergies    SOCIAL HISTORY:   Social History     Socioeconomic History    Marital status:      Spouse name: Not on file    Number of children: Not on file    Years of education: Not on file    Highest education level: Not on file   Occupational History    Not on file   Tobacco Use    Smoking status: Never    Smokeless tobacco: Never   Substance and Sexual Activity    Alcohol use: No    Drug use: No    Sexual activity: Yes     Partners: Male     Birth control/protection: OCP   Other Topics Concern    Not on file   Social History Narrative    Not on file     Social Drivers of Health     Financial Resource Strain: Not on file   Food Insecurity: Not on file   Transportation Needs: Not on file   Physical Activity: Not on file   Stress: Not on file   Social Connections: Not on file   Housing Stability: At Risk (2023)    Received from [facility]     Living Situation: Not on file     Housing Problems: Not on file     PAST MEDICAL HISTORY:   Past Medical History:    Diabetes (HCC)    Gestational diabetes (HCC)    History of type 2 diabetes mellitus    History of varicella as a child    Infertility, female       PAST SURGICAL HISTORY:   Past Surgical History:   Procedure Laterality Date           Cholecystectomy      Hc  section level i      Lasik Bilateral ~    Dr. Nathan Garcia        PHYSICAL EXAM:   Vitals:    25 1116   BP: 125/67   Pulse: 76         General Appearance:  alert, well developed, in no acute distress  Eyes:  pupils are equal, round and reactive. Normal conjunctivae and normal sclera   Neck: Trachea midline: Normal.   Back: no kyphosis or back tenderness  Lymph Nodes:  No abnormal nodes noted  Musculoskeletal:  normal muscle strength and tone  Skin:  normal moisture and skin texture  Hair & Nails:  normal scalp hair     Hematologic:  no excessive bruising  Neuro:  sensory grossly intact and motor grossly intact  Psychiatric:  oriented to time, self, and place  Nutritional:  no abnormal weight gain or loss    LABS: Pertinent labs reviewed    ASSESSMENT/PLAN:    Diabetes mellitus type 2 controlled  -HgA1c- 6.2% POC today   - Will continue fertility medications including dexamethasone until around 8-12 weeks of pregnancy.  -Congratulated patient on continued improved glycemic control and on presumed positive pregnancy test.   -Reviewed with patient the pathogenesis of diabetes, clinical significance of A1c, and common complications such as: microvascular, macrovascular and diabetic ketoacidosis. Patient verbalizes understanding of the importance of glycemic control and the goals of therapy.   -Discussed with patient glucose targets ranges for preconception and pregnancy (Fasting <90 and 2hr post prandial <120).   -reviewed limiting carbs to 165gm of carbs/day.   -discussed importance of incorporating low carb snacks during the day and at bedtime  -reviewed blood sugar target goals: fasting <90 and 2hrs post prandial <120  -reviewed necessity to send us her BG readings every 4-7 days for review during pregnancy  -Discussed dangers of uncontrolled diabetes in pregnancy and possible fetal complications.   -Discussed importance of glycemic control for maternal and fetal  health  -Discussed insulin injection site rotation.   - Given higher fasting sugars- restart NPH 10 units subcutaneous daily.   -Continue to check sugars 4 times daily- fasting and post prandial    -She will send glucose readings via [employer] in 1 week.  -BP elevated, normal on repeat   -normal lipids 6/2024- LDL slightly elevated at 108.   -no nephropathy 6/2024  -Reviewed importance of yearly optho follow up - UTD with optho  -normal foot exam 6/2024    RTC in 1 month   Colette Jaeger, APRN  1/16/25  A total of  30 minutes was spent on obtaining history, reviewing pertinent imaging/labs and specialists notes, evaluating patient, providing multiple treatment options, reinforcing diet/exercise and compliance, and completing documentation.

## 2025-02-04 ENCOUNTER — PATIENT MESSAGE (OUTPATIENT)
Dept: OBGYN CLINIC | Facility: CLINIC | Age: 35
End: 2025-02-04

## 2025-02-06 NOTE — TELEPHONE ENCOUNTER
Received records from [facility] [place]. Placed in transfer of care bin in front office. Patient with OB Nurse Education appointment on 2/18/25.

## 2025-02-10 ENCOUNTER — PATIENT MESSAGE (OUTPATIENT)
Dept: OBGYN CLINIC | Facility: CLINIC | Age: 35
End: 2025-02-10

## 2025-02-10 NOTE — TELEPHONE ENCOUNTER
Call placed to Colette.   7w4d- She reports very light pink spotting yesterday x 3 separate occasions.   Today spotting has completely resolved.   Yesterday she had pelvic cramping rated 2/10. Today she has none.   She reports intercourse the day prior to onset of spotting. Denies constipation, straining or heavy lifting.   Blood type: A pos.     She is IVF pregnancy.  Hx D&E in 2022 for 20 week PPROM of IVF twins.     Notified okay to monitor, reach out with any further bleeding.     Colette is asking if she should be on pelvic rest given her history of PPROM with pregnancy loss?   To Dr. Serra to advise.

## 2025-02-13 ENCOUNTER — TELEPHONE (OUTPATIENT)
Dept: OBGYN CLINIC | Facility: CLINIC | Age: 35
End: 2025-02-13

## 2025-02-13 ENCOUNTER — PATIENT MESSAGE (OUTPATIENT)
Dept: OBGYN CLINIC | Facility: CLINIC | Age: 35
End: 2025-02-13

## 2025-02-13 ENCOUNTER — OFFICE VISIT (OUTPATIENT)
Dept: ENDOCRINOLOGY CLINIC | Facility: CLINIC | Age: 35
End: 2025-02-13
Payer: COMMERCIAL

## 2025-02-13 ENCOUNTER — OFFICE VISIT (OUTPATIENT)
Dept: OBGYN CLINIC | Facility: CLINIC | Age: 35
End: 2025-02-13
Payer: COMMERCIAL

## 2025-02-13 ENCOUNTER — HOSPITAL ENCOUNTER (OUTPATIENT)
Dept: ULTRASOUND IMAGING | Facility: HOSPITAL | Age: 35
Discharge: HOME | End: 2025-02-13
Attending: OBSTETRICS & GYNECOLOGY
Payer: COMMERCIAL

## 2025-02-13 VITALS
WEIGHT: 215.63 LBS | SYSTOLIC BLOOD PRESSURE: 119 MMHG | DIASTOLIC BLOOD PRESSURE: 65 MMHG | HEART RATE: 78 BPM | HEIGHT: 62.99 IN | BODY MASS INDEX: 38.21 KG/M2

## 2025-02-13 VITALS
SYSTOLIC BLOOD PRESSURE: 120 MMHG | HEART RATE: 80 BPM | DIASTOLIC BLOOD PRESSURE: 79 MMHG | BODY MASS INDEX: 38 KG/M2 | WEIGHT: 213.19 LBS

## 2025-02-13 DIAGNOSIS — O24.111 PREGNANCY COMPLICATED BY PRE-EXISTING TYPE 2 DIABETES IN FIRST TRIMESTER (HCC): Primary | ICD-10-CM

## 2025-02-13 DIAGNOSIS — O36.8390 ULTRASOUND SCAN DONE FOR INABILITY TO HEAR FETAL HEART TONES (HCC): Primary | ICD-10-CM

## 2025-02-13 DIAGNOSIS — O36.8390 ULTRASOUND SCAN DONE FOR INABILITY TO HEAR FETAL HEART TONES (HCC): ICD-10-CM

## 2025-02-13 LAB
GLUCOSE BLOOD: 99
HEMOGLOBIN A1C: 6 % (ref 4.3–5.6)
TEST STRIP LOT #: NORMAL NUMERIC

## 2025-02-13 PROCEDURE — 76817 TRANSVAGINAL US OBSTETRIC: CPT | Performed by: OBSTETRICS & GYNECOLOGY

## 2025-02-13 PROCEDURE — 76801 OB US < 14 WKS SINGLE FETUS: CPT | Performed by: OBSTETRICS & GYNECOLOGY

## 2025-02-13 PROCEDURE — 82947 ASSAY GLUCOSE BLOOD QUANT: CPT

## 2025-02-13 PROCEDURE — 3044F HG A1C LEVEL LT 7.0%: CPT

## 2025-02-13 PROCEDURE — 99213 OFFICE O/P EST LOW 20 MIN: CPT | Performed by: OBSTETRICS & GYNECOLOGY

## 2025-02-13 PROCEDURE — 3078F DIAST BP <80 MM HG: CPT

## 2025-02-13 PROCEDURE — 3074F SYST BP LT 130 MM HG: CPT | Performed by: OBSTETRICS & GYNECOLOGY

## 2025-02-13 PROCEDURE — 99213 OFFICE O/P EST LOW 20 MIN: CPT

## 2025-02-13 PROCEDURE — 83036 HEMOGLOBIN GLYCOSYLATED A1C: CPT

## 2025-02-13 PROCEDURE — 3008F BODY MASS INDEX DOCD: CPT

## 2025-02-13 PROCEDURE — 3078F DIAST BP <80 MM HG: CPT | Performed by: OBSTETRICS & GYNECOLOGY

## 2025-02-13 PROCEDURE — 3074F SYST BP LT 130 MM HG: CPT

## 2025-02-13 NOTE — TELEPHONE ENCOUNTER
8w0d- Colette calling with brown spotting in underwear and on wiping.   We spoke Monday- she had previous episodes of pink spotting on 2/9. At that time Dr. eSrra recommended pelvic rest and monitor.     At this time she denies cramping. Denies intercourse, heavy lifting or straining with a BM. Earlier this week spotting was only on wiping, now in underwear.     IVF pregnancy, hx of dilation and curettage in 2022 for 20 week PPROM of IVF twins.     Colette requesting appointment d/t continued spotting.   Added today with Dr. Trinh at 3pm.

## 2025-02-13 NOTE — PROGRESS NOTES
Chief Complaint   Patient presents with    Gyn Problem     SPOTTING IN EARLY PREGNANCY         HPI:  The patient is a 35 yo  @ 8w0d by IVF transfer date with DR THOMAS at [facility] here with brown DC.  Having spotting starting on Monday.  Now ongoing brown dc.  No cramping/pain.  Had last US at 6.5 wks with +FHT per patient.  No SCHs noted per patient.  MBT A+    Chaperone: declines      Depression Screening (PHQ-2/PHQ-9): Over the LAST 2 WEEKS   Little interest or pleasure in doing things: Not at all    Feeling down, depressed, or hopeless: Not at all    PHQ-2 SCORE: 0            Reviewed medical and surgical history below       OBSTETRICS HISTORY:  OB History    Para Term  AB Living   3 1 1 0 1 1   SAB IAB Ectopic Multiple Live Births   0 1 0 0 1       GYNE HISTORY:  History   Sexual Activity    Sexual activity: Yes    Partners: Male    Birth control/ protection: OCP            MEDICAL HISTORY:  Past Medical History:    Diabetes (HCC)    Gestational diabetes (HCC)    History of type 2 diabetes mellitus    History of varicella as a child    Infertility, female       SURGICAL HISTORY:  Past Surgical History:   Procedure Laterality Date      2017    Cholecystectomy      Hc  section level i      Lasik Bilateral ~    Dr. Nathan Garcia        SOCIAL HISTORY:  Social History     Socioeconomic History    Marital status:      Spouse name: Not on file    Number of children: Not on file    Years of education: Not on file    Highest education level: Not on file   Occupational History    Not on file   Tobacco Use    Smoking status: Never    Smokeless tobacco: Never   Substance and Sexual Activity    Alcohol use: No    Drug use: No    Sexual activity: Yes     Partners: Male     Birth control/protection: OCP   Other Topics Concern    Not on file   Social History Narrative    Not on file     Social Drivers of Health     Food Insecurity: Unknown (2025)    NCSS - Food Insecurity     Worried  About Running Out of Food in the Last Year: Not on file     Ran Out of Food in the Last Year: No   Transportation Needs: No Transportation Needs (2/12/2025)    NCSS - Transportation     Lack of Transportation: No   Stress: No Stress Concern Present (2/12/2025)    Stress     Feeling of Stress : No   Housing Stability: Not At Risk (2/12/2025)    NCSS - Housing/Utilities     Has Housing: Yes     Worried About Losing Housing: No     Unable to Get Utilities: No       FAMILY HISTORY:  Family History   Problem Relation Age of Onset    Glaucoma Father     Diabetes Maternal Grandmother     Diabetes Paternal Grandmother     Macular degeneration Neg        MEDICATIONS:    Current Outpatient Medications:     Insulin NPH, Human,, Isophane, (NOVOLIN N FLEXPEN) 100 UNIT/ML Subcutaneous Suspension Pen-injector, Inject 14 Units into the skin daily., Disp: 15 mL, Rfl: 1    MOUNJARO 12.5 MG/0.5ML Subcutaneous Solution Pen-injector, INJECT 12.5 MG SUBCUTANEOUSLY ONE TIME PER WEEK, Disp: 2 mL, Rfl: 1    CONTOUR NEXT TEST In Vitro Strip, USE TO TEST BLOOD SUGARS 4 TIMES DAILY, Disp: 400 strip, Rfl: 0    Tirzepatide (MOUNJARO) 15 MG/0.5ML Subcutaneous Solution Pen-injector, Inject 15 mg into the skin once a week., Disp: 2 mL, Rfl: 1    Insulin Pen Needle (BD PEN NEEDLE PARAMJIT 2ND GEN) 32G X 4 MM Does not apply Misc, Use to inject insulin three times a day, Disp: 300 each, Rfl: 0    drospirenone-ethinyl estradiol 3-0.03 MG Oral Tab, , Disp: , Rfl:     estradiol 2 MG Oral Tab, , Disp: , Rfl:     folic acid 1 MG Oral Tab, Take by mouth daily., Disp: , Rfl:     acidophilus-pectin Oral Cap, Take 1 capsule by mouth daily. FERTILITY PROBIOTIC, Disp: , Rfl:     Omega-3 Fatty Acids (FISH OIL) 1200 MG Oral Cap, Take by mouth., Disp: , Rfl:     Microlet Lancets Does not apply Misc, Use to test blood sugars 4 times daily, Disp: 400 each, Rfl: 0    Glucose Blood (ONETOUCH VERIO) In Vitro Strip, TEST 4 TIMES A DAY, Disp: 200 strip, Rfl: 3    Insulin Pen  Needle (BD PEN NEEDLE ORIGINAL U/F) 29G X 12.7MM Does not apply Misc, Use to inject insulin 4 times daily, Disp: 400 each, Rfl: 0    OneTouch Delica Lancets 33G Does not apply Misc, 4 x daily, Disp: 200 each, Rfl: 3    Blood Glucose Monitoring Suppl (ONETOUCH VERIO FLEX SYSTEM) w/Device Does not apply Kit, 1 kit in the morning, at noon, in the evening, and at bedtime. May substitute per insurance formulary, Disp: 1 kit, Rfl: 3    prenatal vitamin w/DHA 27-0.8-228 MG Oral Cap, Take 1 capsule by mouth daily., Disp: , Rfl:     Vitamin D3, Cholecalciferol, 50 MCG (2000 UT) Oral Tab, Take 1 tablet (2,000 Units total) by mouth daily., Disp: , Rfl:     ALLERGIES:  Allergies[1]      Review of Systems:  Constitutional:  Denies fevers and chills   Cardiovascular:  denies chest pain or palpitations  Respiratory:  denies shortness of breath  Gastrointestinal:  denies heartburn, abdominal pain, diarrhea or constipation  Genitourinary:  denies dysuria, incontinence, abnormal vaginal discharge, vaginal itching  Musculoskeletal:  denies back pain.  Skin/Breast:  Denies any breast pain, lumps, or discharge.   Neurological:  denies headaches, extremity weakness  Psychiatric: denies depression or anxiety.      Vitals:    02/13/25 1452   BP: 120/79   Pulse: 80       PHYSICAL EXAM:   Constitutional: well developed, well nourished  Head/Face: normocephalic  Abdomen:  soft, nontender, nondistended, no masses  Psychiatric: Appropriate mood and affect    Pelvic Exam:  External Genitalia: normal appearance, hair distribution, and no lesions  Urethral Meatus:  normal in size, location, without lesions and prolapse  Bladder:  No fullness, masses or tenderness  Vagina:  Normal appearance without lesions, no abnormal discharge.  Scant brown dc in vault  Cervix:  Normal without tenderness on motion; closed  Uterus: normal in size, contour, position, mobility, without tenderness  Adnexa: normal without masses or tenderness  Perineum:  normal    BSUS: unprepped bladder; unable to see GS clearly due to overlying bowel    Assessment/Plan:    Colette was seen today for gyn problem.    Diagnoses and all orders for this visit:    Ultrasound scan done for inability to hear fetal heart tones (HCC)  -     US PREG 1ST TRIM W/EV (CPT=76801/81142); Future      Plan for hold and call @ 5pm  Dr PAULA on call notified  RH+         [1] No Known Allergies

## 2025-02-13 NOTE — PROGRESS NOTES
Name: Colette Mantilla Date: 2/13/25  Referring Physician: No ref. provider found  Dr. Emeli Sagastume - fertility specialist    CHIEF COMPLAINT   Follow up for DM      HISTORY OF PRESENT ILLNESS   Colette Mantilla  is a 31 year old female who presents for follow up on diabetes management.  5/2022 was seen during twin pregnancy which unfortunately did end in loss due to PPROM at 20 weeks.   Fertility treatments had been on hold for 6 months over the last year. She was transitioned off insulin and we started Mounjaro to help with glycemic control and weight loss in hopes of resuming fertility treatments. Was able to lose almost 50lbs with Mounjaro prior to resuming fertility treatments. Now off Mounjaro and has had a successful transfer with frozen embryo.  She is around 8 weeks pregnant with second child following successful IVF embryo transfer.   FAMILY HISTORY OF DIABETES  -father- DM type 2  -Maternal grandmother- DM type 2  -Paternal grandmother- DM type 2    DIABETES HISTORY  Diagnosed: GDM with prior pregnancy in 2017, progressed to type 2 diabetes controlled with diet.   Prior HbA, C or glycohemoglobin were 6/2018-6.0%; 10/2020- 7.8%; 1/2021- 6.7%; 3/2022- 7.6%; 6.2% 5/2022; 7.2% 1/2023; 5.6% 5/2023; 6.0% 8/2023; 5.6% 1/2024; 5.8% 6/2024; 6.2% 1/2025; 6.0% POC today   Previous DM medications:   Mounjaro 12.5mg subcutaneous weekly- now stopped since resuming fertility treatment and now subsequent pregnancy.   CURRENT MEDICATIONS FOR DM:  NPH- 28 units subcutaneous daily at bedtime.   Novolog- 12-10-12 TID with meals   HOME GLUCOSE READINGS:   Hypoglycemia: Infrequent since last visit- occ. Fasting   Meter or log book today: Checking 3-4 times daily- fasting and post prandial readings  Per recall:  Fasting- 122, 114, 112, 120, 117, 122, 130, 115, 145, 108, 92  2hrs post breakfast- 118, 114, 104, 127, 131, 114, 132  2hrs post lunch- 82, 108, 115, 127, 121, 120, 128  2hrs post dinner- 112, 123, 145, 119, 153, 130,  145    --> Insulin last adjusted yesterday 2/12/25  HISTORY OF DIABETES COMPLICATIONS:  History of Retinopathy: No - last eye exam within the last 12 months: Yes- 9/2024 Had appt with Dr. Blackburn next month   History of Neuropathy: No, denies symptoms  History of Nephropathy: No  ASSOCIATED COMPLICATIONS:   HTN: No   Hyperlipidemia: No   Coronary Artery Disease: No  Cerebrovascular Disease: No  Peripheral Vascular Disease: No    DIETARY COMPLIANCE:  Very Good- still watching carbohydrates and incorporating protein in meals   Breakfast- 2 hard boiled eggs with sausage, low carb egg wrap and ezekial bread, or sometimes skipping   Lunch- turkey Hummus wrap with berries   Dinner- side salad with chicken or chickpea pasta   Beverages- water, tea   Snacks- wheat crackers with cheese, yogurt  EXERCISE:   Limited - on rest now until she meets with OB. Will see 2/27/25    ROS:  Polyuria, polyphagia, polydipsia: no  Paresthesias: no, denies symptoms   Blurred vision: no  Recent steroids, illness or infections: No   Osteoporosis- No  Thyroid disease- No  MEDICATIONS:   Current Outpatient Medications on File Prior to Visit   Medication Sig Dispense Refill    Insulin NPH, Human,, Isophane, (NOVOLIN N FLEXPEN) 100 UNIT/ML Subcutaneous Suspension Pen-injector Inject 14 Units into the skin daily. 15 mL 1    MOUNJARO 12.5 MG/0.5ML Subcutaneous Solution Pen-injector INJECT 12.5 MG SUBCUTANEOUSLY ONE TIME PER WEEK 2 mL 1    CONTOUR NEXT TEST In Vitro Strip USE TO TEST BLOOD SUGARS 4 TIMES DAILY 400 strip 0    Tirzepatide (MOUNJARO) 15 MG/0.5ML Subcutaneous Solution Pen-injector Inject 15 mg into the skin once a week. 2 mL 1    Insulin Pen Needle (BD PEN NEEDLE PARAMJIT 2ND GEN) 32G X 4 MM Does not apply Misc Use to inject insulin three times a day 300 each 0    drospirenone-ethinyl estradiol 3-0.03 MG Oral Tab       estradiol 2 MG Oral Tab       folic acid 1 MG Oral Tab Take by mouth daily.      acidophilus-pectin Oral Cap Take 1 capsule by  mouth daily. FERTILITY PROBIOTIC      Omega-3 Fatty Acids (FISH OIL) 1200 MG Oral Cap Take by mouth.      Microlet Lancets Does not apply Misc Use to test blood sugars 4 times daily 400 each 0    Glucose Blood (ONETOUCH VERIO) In Vitro Strip TEST 4 TIMES A  strip 3    Insulin Pen Needle (BD PEN NEEDLE ORIGINAL U/F) 29G X 12.7MM Does not apply Misc Use to inject insulin 4 times daily 400 each 0    OneTouch Delica Lancets 33G Does not apply Misc 4 x daily 200 each 3    Blood Glucose Monitoring Suppl (ONETOUCH VERIO FLEX SYSTEM) w/Device Does not apply Kit 1 kit in the morning, at noon, in the evening, and at bedtime. May substitute per insurance formulary 1 kit 3    prenatal vitamin w/DHA 27-0.8-228 MG Oral Cap Take 1 capsule by mouth daily.      Vitamin D3, Cholecalciferol, 50 MCG (2000 UT) Oral Tab Take 1 tablet (2,000 Units total) by mouth daily.       No current facility-administered medications on file prior to visit.     ALLERGIES:   No Known Allergies    SOCIAL HISTORY:   Social History     Socioeconomic History    Marital status:      Spouse name: Not on file    Number of children: Not on file    Years of education: Not on file    Highest education level: Not on file   Occupational History    Not on file   Tobacco Use    Smoking status: Never    Smokeless tobacco: Never   Substance and Sexual Activity    Alcohol use: No    Drug use: No    Sexual activity: Yes     Partners: Male     Birth control/protection: OCP   Other Topics Concern    Not on file   Social History Narrative    Not on file     Social Drivers of Health     Food Insecurity: Unknown (2/12/2025)    NCSS - Food Insecurity     Worried About Running Out of Food in the Last Year: Not on file     Ran Out of Food in the Last Year: No   Transportation Needs: No Transportation Needs (2/12/2025)    NCSS - Transportation     Lack of Transportation: No   Stress: No Stress Concern Present (2/12/2025)    Stress     Feeling of Stress : No    Housing Stability: Not At Risk (2025)    NCSS - Housing/Utilities     Has Housing: Yes     Worried About Losing Housing: No     Unable to Get Utilities: No     PAST MEDICAL HISTORY:   Past Medical History:    Diabetes (HCC)    Gestational diabetes (HCC)    History of type 2 diabetes mellitus    History of varicella as a child    Infertility, female     PAST SURGICAL HISTORY:   Past Surgical History:   Procedure Laterality Date      2017    Cholecystectomy      Hc  section level i      Lasik Bilateral ~    Dr. Nathan Garcia        PHYSICAL EXAM:   Vitals:    25 1023   BP: 119/65   Pulse: 78         General Appearance:  alert, well developed, in no acute distress  Eyes:  pupils are equal, round and reactive. Normal conjunctivae and normal sclera   Neck: Trachea midline: Normal.   Back: no kyphosis or back tenderness  Lymph Nodes:  No abnormal nodes noted  Musculoskeletal:  normal muscle strength and tone  Skin:  normal moisture and skin texture  Hair & Nails:  normal scalp hair     Hematologic:  no excessive bruising  Neuro:  sensory grossly intact and motor grossly intact  Psychiatric:  oriented to time, self, and place  Nutritional:  no abnormal weight gain or loss    LABS: Pertinent labs reviewed    ASSESSMENT/PLAN:    Diabetes mellitus type 2 controlled  -HgA1c- 6.0% POC today   - Will continue fertility medications including dexamethasone until around 12 weeks of pregnancy.  -Congratulated patient on continued improved glycemic control and on positive pregnancy test  -Reviewed with patient the pathogenesis of diabetes, clinical significance of A1c, and common complications such as: microvascular, macrovascular and diabetic ketoacidosis. Patient verbalizes understanding of the importance of glycemic control and the goals of therapy.   -Discussed with patient glucose targets ranges for preconception and pregnancy (Fasting <90 and 2hr post prandial <120).   -reviewed limiting carbs  to 165gm of carbs/day.   -discussed importance of incorporating low carb snacks during the day and at bedtime  -reviewed blood sugar target goals: fasting <90 and 2hrs post prandial <120  -reviewed necessity to send us her BG readings every 4-7 days for review during pregnancy  -Discussed dangers of uncontrolled diabetes in pregnancy and possible fetal complications.   -Discussed importance of glycemic control for maternal and fetal health  -Discussed insulin injection site rotation.     - Continue NPH 28 units subcutaneous daily.   - Continue Novolog 12-10-12 TID with meals. Reviewed insulin timing and administration.    -Continue to check sugars 4 times daily- fasting and post prandial    -She will send glucose readings via [employer] [facility] [employer] in 1 week.  -normotensive   -lipids 6/2024- LDL slightly elevated at 108. Will address after pregnancy  -no nephropathy 6/2024  -Reviewed importance of yearly optho follow up - UTD with optho  -normal foot exam 6/2024  - Reviewed monthly endocrine follow up during pregnancy.  -She is not planning to breastfeed at this time- would like to consider restarting Mounjaro after delivery.    RTC in 1 month   EUSEBIO Borden  2/13/25  A total of  25 minutes was spent on obtaining history, reviewing pertinent imaging/labs and specialists notes, evaluating patient, providing multiple treatment options, reinforcing diet/exercise and compliance, and completing documentation.

## 2025-02-14 ENCOUNTER — PATIENT MESSAGE (OUTPATIENT)
Dept: OBGYN CLINIC | Facility: CLINIC | Age: 35
End: 2025-02-14

## 2025-02-18 ENCOUNTER — PATIENT MESSAGE (OUTPATIENT)
Dept: OBGYN CLINIC | Facility: CLINIC | Age: 35
End: 2025-02-18

## 2025-02-18 ENCOUNTER — NURSE ONLY (OUTPATIENT)
Dept: OBGYN CLINIC | Facility: CLINIC | Age: 35
End: 2025-02-18
Payer: COMMERCIAL

## 2025-02-18 DIAGNOSIS — O24.319 PREGESTATIONAL DIABETES MELLITUS, MODIFIED WHITE CLASS B (HCC): ICD-10-CM

## 2025-02-18 DIAGNOSIS — Z34.81 ENCOUNTER FOR SUPERVISION OF OTHER NORMAL PREGNANCY IN FIRST TRIMESTER (HCC): Primary | ICD-10-CM

## 2025-02-18 RX ORDER — DEXAMETHASONE 0.5 MG/1
0.25 TABLET ORAL
COMMUNITY

## 2025-02-18 NOTE — PROGRESS NOTES
Pt had  OBN appt today with no complaints. Normal PN labs ordered plus hemoglobin A1c, CMP, and p/c ratio, and varicella. Pt advised all labs must be completed and resulted prior to NPN appt. If labs are not completed and resulted the NPN appt will be cancelled. Pt informed again of both male and female providers and the need to rotate PN appt with all providers since OB on-call will be the one that delivers her. Assisted pt with scheduling NPN appt with MD.     IVF records placed in [employer] appt folder slot in front [place] office for NPN appt.       Weight: 212 lb   Height: 5'2\"  BMI: 37.56     Partner's name is Sebastian Mantilla contact # [phone]; race:    Occupation: Unemployed     MEDICAL HISTORY    Anemia No    Anesthetic complications No    Anxiety/Depression  No    Autoimmune Disorder No    Asthma  No    Cancer No    Diabetes  Yes- pre gestational diabetes. Sees endocrine. Currently on 28 units of novolin at bedtime, 12 units of novolog with breakfast, 10 units with lunch, and 12 units with dinner. Instructed to continue diabetic management with endocrine but send BS log to our office weekly as well.     Gyne/breast Surgery Yes-  c section 2017    Heart Disease No    Hepatitis/Liver Disease  No    History of blood transfusion No    History of abnormal pap No    Hypertension  No    Infertility  Yes-IVF pregnancy     Kidney Disease/Frequent UTIs  No    Medication Allergies No    Latex Allergies No    Food Allergies  No    Neurological Disorder/Epilepsy No    Operations/Hospitalizations C section 2017     Gall bladder removal 2007     [place] teeth removal     TB exposure  No    Thyroid Dysfunction No    Trauma/Violence  No    Uterine Anomaly  No    Uterine Fibroids  No    Variocosities/DVTs No    Smoker No    Drug usage in prior year No    Alcohol No    Would you accept a blood transfusion? If no, are you a Episcopalian? Pt stated she would accept blood products in event of emergency                  INFECTION HISTORY    Chlamydia No    Pt or partner have hx of Genital Herpes No    Gonorrhea No    Hepatitis B No    HIV No    HPV No    MRSA No    Syphilis No    Tattoos No    Live with someone or Exposed to TB No    Rash or viral illness since LMP  Had norovirus at the end of January     Varicella Yes    Pets No        GENETICS SCREENING    Genetic Screening    Genetic Screening/Teratology Counseling- Includes patient, baby's father, or anyone in either family with:  Patient's age 35 years or older as of estimated date of delivery: Yes     Thalassemia (Italian, Greek, Mediterranean, or  background): MCV less than 80: No     Neural tube defect (Meningomyelocele, Spina bifida, or Anencephaly): No     Congenital heart defect: Yes (Comment: pts father with heart murmur)     Down syndrome: No     Preston-Sachs (Ashkenazi [place]din, Cajun, Turkish [place]): No     Canavan disease (Ashkenazi Uatsdin): No     Familial dysautonomia (Ashkenazi Uatsdin): No     Sickle cell disease or trait (): No     Hemophilia or other blood disorders: No     Muscular dystrophy: No    Cystic fibrosis: No     [place]'s chorea: No     Intellectual disability and/or autism: No     Other inherited genetic or chromosomal disorder: No     Maternal metabolic disorder (eg. Type 1 diabetes, PKU): No     Patient or baby's father had child with birth defects not listed above: No     Recurrent pregnancy loss, or a stillbirth: No     Medications (including supplements, vitamins, herbs, or OTC drugs)/illicit/recreational drugs/alcohol since last menstrual period: No                 MISC    Infant vaccinations  Yes

## 2025-02-20 ENCOUNTER — LAB ENCOUNTER (OUTPATIENT)
Dept: LAB | Facility: HOSPITAL | Age: 35
End: 2025-02-20
Attending: OBSTETRICS & GYNECOLOGY
Payer: COMMERCIAL

## 2025-02-20 DIAGNOSIS — Z34.81 ENCOUNTER FOR SUPERVISION OF OTHER NORMAL PREGNANCY IN FIRST TRIMESTER (HCC): ICD-10-CM

## 2025-02-20 DIAGNOSIS — O24.319 PREGESTATIONAL DIABETES MELLITUS, MODIFIED WHITE CLASS B (HCC): ICD-10-CM

## 2025-02-20 LAB
ALBUMIN SERPL-MCNC: 4.1 G/DL (ref 3.2–4.8)
ALBUMIN/GLOB SERPL: 1.5 {RATIO} (ref 1–2)
ALP LIVER SERPL-CCNC: 39 U/L
ALT SERPL-CCNC: 17 U/L
ANION GAP SERPL CALC-SCNC: 10 MMOL/L (ref 0–18)
ANTIBODY SCREEN: NEGATIVE
AST SERPL-CCNC: 11 U/L (ref ?–34)
BASOPHILS # BLD AUTO: 0.02 X10(3) UL (ref 0–0.2)
BASOPHILS NFR BLD AUTO: 0.3 %
BILIRUB SERPL-MCNC: 0.5 MG/DL (ref 0.3–1.2)
BUN BLD-MCNC: 18 MG/DL (ref 9–23)
BUN/CREAT SERPL: 29.5 (ref 10–20)
CALCIUM BLD-MCNC: 9.2 MG/DL (ref 8.7–10.4)
CHLORIDE SERPL-SCNC: 106 MMOL/L (ref 98–112)
CO2 SERPL-SCNC: 22 MMOL/L (ref 21–32)
CREAT BLD-MCNC: 0.61 MG/DL
CREAT UR-SCNC: 114.3 MG/DL
DEPRECATED RDW RBC AUTO: 42 FL (ref 35.1–46.3)
EGFRCR SERPLBLD CKD-EPI 2021: 119 ML/MIN/1.73M2 (ref 60–?)
EOSINOPHIL # BLD AUTO: 0.06 X10(3) UL (ref 0–0.7)
EOSINOPHIL NFR BLD AUTO: 0.8 %
ERYTHROCYTE [DISTWIDTH] IN BLOOD BY AUTOMATED COUNT: 13.2 % (ref 11–15)
EST. AVERAGE GLUCOSE BLD GHB EST-MCNC: 126 MG/DL (ref 68–126)
FASTING STATUS PATIENT QL REPORTED: NO
GLOBULIN PLAS-MCNC: 2.7 G/DL (ref 2–3.5)
GLUCOSE BLD-MCNC: 89 MG/DL (ref 70–99)
HBA1C MFR BLD: 6 % (ref ?–5.7)
HBV SURFACE AG SER-ACNC: <0.1 [IU]/L
HBV SURFACE AG SERPL QL IA: NONREACTIVE
HCT VFR BLD AUTO: 39 %
HCV AB SERPL QL IA: NONREACTIVE
HGB BLD-MCNC: 12.7 G/DL
IMM GRANULOCYTES # BLD AUTO: 0.02 X10(3) UL (ref 0–1)
IMM GRANULOCYTES NFR BLD: 0.3 %
LYMPHOCYTES # BLD AUTO: 1.65 X10(3) UL (ref 1–4)
LYMPHOCYTES NFR BLD AUTO: 22.8 %
MCH RBC QN AUTO: 28.3 PG (ref 26–34)
MCHC RBC AUTO-ENTMCNC: 32.6 G/DL (ref 31–37)
MCV RBC AUTO: 87.1 FL
MONOCYTES # BLD AUTO: 0.5 X10(3) UL (ref 0.1–1)
MONOCYTES NFR BLD AUTO: 6.9 %
NEUTROPHILS # BLD AUTO: 4.99 X10 (3) UL (ref 1.5–7.7)
NEUTROPHILS # BLD AUTO: 4.99 X10(3) UL (ref 1.5–7.7)
NEUTROPHILS NFR BLD AUTO: 68.9 %
OSMOLALITY SERPL CALC.SUM OF ELEC: 287 MOSM/KG (ref 275–295)
PLATELET # BLD AUTO: 217 10(3)UL (ref 150–450)
POTASSIUM SERPL-SCNC: 4.2 MMOL/L (ref 3.5–5.1)
PROT SERPL-MCNC: 6.8 G/DL (ref 5.7–8.2)
PROT UR-MCNC: 15.4 MG/DL (ref ?–14)
PROT/CREAT UR-RTO: 0.13
RBC # BLD AUTO: 4.48 X10(6)UL
RH BLOOD TYPE: POSITIVE
RUBV IGG SER QL: POSITIVE
RUBV IGG SER-ACNC: 87 IU/ML (ref 10–?)
SODIUM SERPL-SCNC: 138 MMOL/L (ref 136–145)
T PALLIDUM AB SER QL IA: NONREACTIVE
WBC # BLD AUTO: 7.2 X10(3) UL (ref 4–11)

## 2025-02-20 PROCEDURE — 36415 COLL VENOUS BLD VENIPUNCTURE: CPT

## 2025-02-20 PROCEDURE — 86780 TREPONEMA PALLIDUM: CPT

## 2025-02-20 PROCEDURE — 86900 BLOOD TYPING SEROLOGIC ABO: CPT

## 2025-02-20 PROCEDURE — 87340 HEPATITIS B SURFACE AG IA: CPT

## 2025-02-20 PROCEDURE — 87389 HIV-1 AG W/HIV-1&-2 AB AG IA: CPT

## 2025-02-20 PROCEDURE — 84156 ASSAY OF PROTEIN URINE: CPT

## 2025-02-20 PROCEDURE — 80053 COMPREHEN METABOLIC PANEL: CPT

## 2025-02-20 PROCEDURE — 86850 RBC ANTIBODY SCREEN: CPT

## 2025-02-20 PROCEDURE — 86901 BLOOD TYPING SEROLOGIC RH(D): CPT

## 2025-02-20 PROCEDURE — 86762 RUBELLA ANTIBODY: CPT

## 2025-02-20 PROCEDURE — 87086 URINE CULTURE/COLONY COUNT: CPT

## 2025-02-20 PROCEDURE — 86787 VARICELLA-ZOSTER ANTIBODY: CPT

## 2025-02-20 PROCEDURE — 86803 HEPATITIS C AB TEST: CPT

## 2025-02-20 PROCEDURE — 82570 ASSAY OF URINE CREATININE: CPT

## 2025-02-20 PROCEDURE — 83036 HEMOGLOBIN GLYCOSYLATED A1C: CPT

## 2025-02-20 PROCEDURE — 85025 COMPLETE CBC W/AUTO DIFF WBC: CPT

## 2025-02-21 LAB — VZV IGG SER IA-ACNC: 9.47 (ref 1–?)

## 2025-02-26 ENCOUNTER — PATIENT MESSAGE (OUTPATIENT)
Dept: OBGYN CLINIC | Facility: CLINIC | Age: 35
End: 2025-02-26

## 2025-02-27 ENCOUNTER — INITIAL PRENATAL (OUTPATIENT)
Dept: OBGYN CLINIC | Facility: CLINIC | Age: 35
End: 2025-02-27
Payer: COMMERCIAL

## 2025-02-27 ENCOUNTER — LAB ENCOUNTER (OUTPATIENT)
Dept: LAB | Facility: HOSPITAL | Age: 35
End: 2025-02-27
Attending: OBSTETRICS & GYNECOLOGY
Payer: COMMERCIAL

## 2025-02-27 ENCOUNTER — PATIENT MESSAGE (OUTPATIENT)
Dept: OBGYN CLINIC | Facility: CLINIC | Age: 35
End: 2025-02-27

## 2025-02-27 ENCOUNTER — TELEPHONE (OUTPATIENT)
Dept: OBGYN CLINIC | Facility: CLINIC | Age: 35
End: 2025-02-27

## 2025-02-27 VITALS
DIASTOLIC BLOOD PRESSURE: 76 MMHG | HEART RATE: 71 BPM | BODY MASS INDEX: 39 KG/M2 | WEIGHT: 219.81 LBS | SYSTOLIC BLOOD PRESSURE: 112 MMHG

## 2025-02-27 DIAGNOSIS — Z34.81 ENCOUNTER FOR SUPERVISION OF OTHER NORMAL PREGNANCY IN FIRST TRIMESTER (HCC): Primary | ICD-10-CM

## 2025-02-27 DIAGNOSIS — E11.9 TYPE 2 DIABETES MELLITUS WITHOUT COMPLICATION, WITH LONG-TERM CURRENT USE OF INSULIN (HCC): ICD-10-CM

## 2025-02-27 DIAGNOSIS — O09.521 AMA (ADVANCED MATERNAL AGE) MULTIGRAVIDA 35+, FIRST TRIMESTER (HCC): Primary | ICD-10-CM

## 2025-02-27 DIAGNOSIS — Z31.83 IN VITRO FERTILIZATION: ICD-10-CM

## 2025-02-27 DIAGNOSIS — O24.011 PRE-EXISTING TYPE 1 DIABETES MELLITUS DURING PREGNANCY IN FIRST TRIMESTER (HCC): ICD-10-CM

## 2025-02-27 DIAGNOSIS — Z87.59 HISTORY OF PRETERM PREMATURE RUPTURE OF MEMBRANES (PPROM): ICD-10-CM

## 2025-02-27 DIAGNOSIS — O24.112 TYPE 2 DIABETES MELLITUS AFFECTING PREGNANCY IN SECOND TRIMESTER, ANTEPARTUM (HCC): Primary | ICD-10-CM

## 2025-02-27 DIAGNOSIS — Z79.4 TYPE 2 DIABETES MELLITUS WITHOUT COMPLICATION, WITH LONG-TERM CURRENT USE OF INSULIN (HCC): ICD-10-CM

## 2025-02-27 PROBLEM — Z28.39 RUBELLA NON-IMMUNE STATUS, ANTEPARTUM (HCC): Status: RESOLVED | Noted: 2022-03-22 | Resolved: 2025-02-27

## 2025-02-27 PROBLEM — Z98.890 HX OF LASIK: Status: RESOLVED | Noted: 2021-01-28 | Resolved: 2025-02-27

## 2025-02-27 PROBLEM — Z13.79 GENETIC SCREENING: Status: RESOLVED | Noted: 2022-04-21 | Resolved: 2025-02-27

## 2025-02-27 PROBLEM — O30.041 DICHORIONIC DIAMNIOTIC TWIN PREGNANCY IN FIRST TRIMESTER (HCC): Status: RESOLVED | Noted: 2022-03-23 | Resolved: 2025-02-27

## 2025-02-27 PROBLEM — O42.919 PRETERM PREMATURE RUPTURE OF MEMBRANES (PPROM) WITH UNKNOWN ONSET OF LABOR (HCC): Status: RESOLVED | Noted: 2022-06-07 | Resolved: 2025-02-27

## 2025-02-27 PROBLEM — O09.899 RUBELLA NON-IMMUNE STATUS, ANTEPARTUM (HCC): Status: RESOLVED | Noted: 2022-03-22 | Resolved: 2025-02-27

## 2025-02-27 LAB
APPEARANCE: CLEAR
BILIRUBIN: NEGATIVE
CREAT UR-SCNC: 18.9 MG/DL
GLUCOSE (URINE DIPSTICK): NEGATIVE MG/DL
KETONES (URINE DIPSTICK): NEGATIVE MG/DL
LEUKOCYTES: NEGATIVE
MULTISTIX LOT#: ABNORMAL NUMERIC
NITRITE, URINE: NEGATIVE
OCCULT BLOOD: NEGATIVE
PH, URINE: 6 (ref 4.5–8)
PROT UR-MCNC: <6 MG/DL (ref ?–14)
PROTEIN (URINE DIPSTICK): NEGATIVE MG/DL
SPECIFIC GRAVITY: 1.01 (ref 1–1.03)
URINE-COLOR: YELLOW
UROBILINOGEN,SEMI-QN: 0.2 MG/DL (ref 0–1.9)

## 2025-02-27 PROCEDURE — 84156 ASSAY OF PROTEIN URINE: CPT

## 2025-02-27 PROCEDURE — 3074F SYST BP LT 130 MM HG: CPT | Performed by: OBSTETRICS & GYNECOLOGY

## 2025-02-27 PROCEDURE — 82570 ASSAY OF URINE CREATININE: CPT

## 2025-02-27 PROCEDURE — 76801 OB US < 14 WKS SINGLE FETUS: CPT | Performed by: OBSTETRICS & GYNECOLOGY

## 2025-02-27 PROCEDURE — 3078F DIAST BP <80 MM HG: CPT | Performed by: OBSTETRICS & GYNECOLOGY

## 2025-02-27 PROCEDURE — 81002 URINALYSIS NONAUTO W/O SCOPE: CPT | Performed by: OBSTETRICS & GYNECOLOGY

## 2025-02-27 NOTE — TELEPHONE ENCOUNTER
Needs MFM consult and ultrasound at 12-13 wks for h/o PPROM and twin loss at 20 week  Needs level 2 ultrasound, fetal echo, growth ultrasound, NST for DM and IVF

## 2025-02-27 NOTE — PROGRESS NOTES
Here with partner.  Declined first trimester screen.  Last pap 12/2023.  Gc/chlamydia/trichomonas.   Insulin 12+10+12+36N, managed by endo.   Had ob ultrasound 2/13 for spotting.  Bedside ultrasound--+FHT.  RTC 4 wk

## 2025-02-28 LAB
C TRACH DNA SPEC QL NAA+PROBE: NEGATIVE
N GONORRHOEA DNA SPEC QL NAA+PROBE: NEGATIVE
T VAGINALIS RRNA SPEC QL NAA+PROBE: NEGATIVE

## 2025-03-06 ENCOUNTER — PATIENT MESSAGE (OUTPATIENT)
Dept: OBGYN CLINIC | Facility: CLINIC | Age: 35
End: 2025-03-06

## 2025-03-07 ENCOUNTER — PATIENT MESSAGE (OUTPATIENT)
Dept: ENDOCRINOLOGY CLINIC | Facility: CLINIC | Age: 35
End: 2025-03-07

## 2025-03-08 NOTE — TELEPHONE ENCOUNTER
Endocrine refill protocol for rapid acting, regular, intermediate, and mixed insulin:    Protocol Criteria:  PASSED     If all below requirements are met, send a 90-day supply with 1 refill per provider protocol.    Verify appointment with Endocrinology completed in the last 6 months or scheduled in the next 3 months.  Verify A1C has been completed within the last 6 months and is below 8.5%    -May substitute prescriptions for Novolog and Humalog unless documented allergy (pens and vials) at the same dose and concentration per insurance preference and provider protocol.   -May substitute prescriptions for Novolin R and Humulin R unless documented allergy (pens and vials) at the same dose and concentration per insurance preference and provider protocol.   -May substitute prescriptions for Novolin N and Humulin N unless documented allergy (pens and vials) at the same dose and concentration per insurance preference and provider protocol.   -May substitute prescriptions for Humulin and Novolin 70/30 insulin unless documented allergy at the same dose and concentration per insurance preference and provider protocol.    Last completed office visit: 2/13/2025 Colette Jaeger APRN   Next scheduled Follow up:   Future Appointments   Date Time Provider Department Center   3/20/2025 10:30 AM Colette Jaeger APRN [facility]      Last A1C result: 6% done 2/20/2025.

## 2025-03-10 RX ORDER — HUMAN INSULIN 100 [IU]/ML
40 INJECTION, SUSPENSION SUBCUTANEOUS DAILY
Qty: 36 ML | Refills: 1 | Status: SHIPPED | OUTPATIENT
Start: 2025-03-10

## 2025-03-12 ENCOUNTER — PATIENT MESSAGE (OUTPATIENT)
Dept: OBGYN CLINIC | Facility: CLINIC | Age: 35
End: 2025-03-12

## 2025-03-17 NOTE — PROGRESS NOTES
Outpatient Maternal-Fetal Medicine Consultation    Dear Dr. Baird    Thank you for requesting ultrasound evaluation and maternal fetal medicine consultation on your patient Colette Mantilla.  As you are aware she is a 34 year old female  with a castaneda pregnancy and an Estimated Date of Delivery: 25.  A maternal-fetal medicine consultation was requested secondary to Advanced Maternal Age, Diabetes, pre-gestational, Obesity, morbid, and Prior  PROM.  Her prenatal records and labs were reviewed.      Type 2 diabetes.  It had resolved after she took a weight loss medication.  However during the IVF process, she gained some of the weight back and her blood sugars started increasing.  She is being managed by OB.  Current dosing is 12/10/12/42  ROS    HISTORY  OB History    Para Term  AB Living   3 2 1 1 0 1   SAB IAB Ectopic Multiple Live Births     0     1      # Outcome Date GA Lbr Karri/2nd Weight Sex Type Anes PTL Lv   3 Current            2  2022 20w0d       FD      Birth Comments: twin loss after PPROM at 20 weeks   1 Term 17 39w0d  7 lb 10 oz (3.459 kg) F Caesarean  N INDRA      Birth Comments: [facility]- [facility]      Complications: Temp 100.4 or greater, Failure to progress       Allergies:  Allergies[1]   Current Meds:  Current Outpatient Medications   Medication Sig Dispense Refill    Insulin NPH, Human,, Isophane, (NOVOLIN N FLEXPEN) 100 UNIT/ML Subcutaneous Suspension Pen-injector Inject 40 Units into the skin daily. 36 mL 1    dexamethasone 0.5 MG Oral Tab Take 0.5 tablets (0.25 mg total) by mouth daily with breakfast.      BABY ASPIRIN OR Take 1 tablet by mouth daily.      insulin aspart 100 UNIT/ML Subcutaneous Solution Cartridge Inject 12 Units into the skin once. 12 units with breakfast, 10 units with lunch, 12 units with dinner      CONTOUR NEXT TEST In Vitro Strip USE TO TEST BLOOD SUGARS 4 TIMES DAILY 400 strip 0    Insulin Pen Needle (BD PEN NEEDLE PARAMJIT 2ND GEN)  32G X 4 MM Does not apply Misc Use to inject insulin three times a day 300 each 0    folic acid 1 MG Oral Tab Take by mouth daily.      acidophilus-pectin Oral Cap Take 1 capsule by mouth daily. FERTILITY PROBIOTIC (Patient not taking: Reported on 2025)      Microlet Lancets Does not apply Misc Use to test blood sugars 4 times daily 400 each 0    Glucose Blood (ONETOUCH VERIO) In Vitro Strip TEST 4 TIMES A  strip 3    Insulin Pen Needle (BD PEN NEEDLE ORIGINAL U/F) 29G X 12.7MM Does not apply Misc Use to inject insulin 4 times daily 400 each 0    OneTouch Delica Lancets 33G Does not apply Misc 4 x daily 200 each 3    Blood Glucose Monitoring Suppl (ONETOUCH VERIO FLEX SYSTEM) w/Device Does not apply Kit 1 kit in the morning, at noon, in the evening, and at bedtime. May substitute per insurance formulary 1 kit 3    prenatal vitamin w/DHA 27-0.8-228 MG Oral Cap Take 1 capsule by mouth daily.      Vitamin D3, Cholecalciferol, 50 MCG (2000 UT) Oral Tab Take 1 tablet (2,000 Units total) by mouth daily.          HISTORY:  Past Medical History:    Diabetes (HCC)    History of varicella as a child    Infertility, female      Past Surgical History:   Procedure Laterality Date      2017    Cholecystectomy      Lasik Bilateral ~    Dr. Nathan Garcia       Family History   Problem Relation Age of Onset    Glaucoma Father     Diabetes Maternal Grandmother     Diabetes Paternal Grandmother     Macular degeneration Neg       Social History     Socioeconomic History    Marital status:    Tobacco Use    Smoking status: Never    Smokeless tobacco: Never   Substance and Sexual Activity    Alcohol use: Never    Drug use: No    Sexual activity: Yes     Partners: Male     Birth control/protection: OCP     Social Drivers of Health     Food Insecurity: No Food Insecurity (2025)    NCSS - Food Insecurity     Worried About Running Out of Food in the Last Year: No     Ran Out of Food in the Last Year: No    Transportation Needs: No Transportation Needs (2/20/2025)    NCSS - Transportation     Lack of Transportation: No   Stress: No Stress Concern Present (2/20/2025)    Stress     Feeling of Stress : No   Housing Stability: Not At Risk (2/20/2025)    NCSS - Housing/Utilities     Has Housing: Yes     Worried About Losing Housing: No     Unable to Get Utilities: No          PHYSICAL EXAMINATION:  /75   Pulse 78   Wt 222 lb (100.7 kg)   LMP 12/20/2024 (Approximate)   BMI 39.34 kg/m²   OBGyn Exam      OBSTETRIC ULTRASOUND  The patient had a first trimester ultrasound today which revealed normal first trimester anatomy with size consistent with dates.  The NT measurement was: 1.4 mm; this is within normal limits.  The nasal bone is present.  Single IUP with cardiac activity 159 bpm  Amniotic fluid is normal.  Placenta location is posterior  The CRL is consistent with gestational age. Nasal bone present. The nuchal translucency measures 1.4 mm. This is within normal limits.   Imaging is suboptimal.  The maternal uterus and ovaries appear normal.  See PACS/Imaging Tab For Complete Ultrasound Report  I interpreted the results and reviewed them with the patient.    DISCUSSION  During her visit we discussed and reviewed the following issues:  ADVANCED MATERNAL AGE    Background  I reviewed with the patient that pregnancies in women of advanced maternal age (35 or older at delivery) are associated with elevated risks. Specifically, there is a higher rate of:  Fetal malformations  Preeclampsia  Gestational diabetes  Intrauterine fetal death    As a result, enhanced pregnancy surveillance is advised for these patients including a comprehensive ultrasound to assess for fetal malformations (at 20 weeks) and a third trimester ultrasound assessment for fetal growth (at 32 weeks). In addition, weekly NST's (initiating at 36 weeks gestation for women 35-39 years and at 32 weeks gestation for women 40 years and older) are also  advised. Routine obstetric care is more than adequate to assess for gestational diabetes and preeclampsia; hence, no further significant alterations in obstetric care are advised.    Medical Complications    Women 35 years of age or older can expect to experience two to three fold higher rates of hospitalization,  delivery, and pregnancy-related complications when compared to their younger counterparts.  The two most common medical problems complicating these  pregnanccies are hypertension and diabetes.   The incidence of preeclampsia in the general obstetric population is 3 to 4 percent; this increases to 5 to 10 percent in women over age 40 and is as high as 35 percent in women over age 50.   The incidence of gestational diabetes in the general obstetric population is 3 percent, rising to 7 to 12 percent in women over age 40 and 20 percent in women over age 50.  Women 35 years of age or older are more likely to be delivered by . The  delivery rate in the general obstetric population of the [place] is almost 30 percent, compared to almost 50 percent in women over age 40 to 45 and almost 80 percent in women age 50 to 63.          Fetal Death        A decision analysis tool using data from the [place] [employer]  Database predicted a strategy of weekly antepartum testing and labor induction would lower the risk of unexplained fetal death in women 35 years of age or older. In this model, weekly testing starting at 36 weeks of gestation would drop the risk of fetal death from 5.2 to 1.3 per 1000 pregnancies. While a policy of antepartum testing in older women does increase the chance that a women will be induced (71 inductions per fetal death averted) and thereby increases her risk of having a  delivery, only 14 additional cesareans would need to be performed to avert one unexplained fetal death.  Hence, weekly NST's are advised for women of advanced maternal age; testing  should be initiated at 36 weeks for women 35-39 years and at 32 weeks for women 40 years and older.    Fetal Malformations    Cardiac malformations, clubfoot, and diaphragmatic hernia appear to occur with increased frequency in offspring of older women. These abnormalities are structural and unrelated to aneuploidy, thus they would not be detected by karyotype analysis.  For these reasons a complete, detailed ultrasound (level II) is advised even if the fetus has a normal karyotype.      Fetal Aneuploidy      Invasive Testing  I offered invasive genetic testing (amniocentesis, chorionic villus sampling) after reviewing the diagnostic accuracy of these tests as well as the procedure associated loss rate (1:500 for genetic amniocentesis).    She ultimately does not desire invasive genetic testing.     We discussed  the increased risk of chromosomal abnormalities associated with advanced maternal age at age  34 year old. She understands that ultrasound exam cannot exclude potential genetic abnormalities.  Her estimated risk based on maternal age at term with any chromosome abnormality is about 1: 150 and with Down Syndrome is about 1: 250.   We also discussed the risks and benefits of having  genetic testing ([facility] and amniocentesis) performed.      Non-invasive Pregnancy Testing (NIPT)  I reviewed current non-invasive screening options. Currently non-invasive pregnancy testing (NIPT) offers the highest detection rate (with the lowest false positive rate) for the detection of fetal aneuploidy amongst high-risk patients. The limitations of detailed mid-trimester sonography was reviewed with the patient. First trimester screening and second trimester multiple-marker serum serum screening as alternative aneuploidy screening options were also reviewed. However, both of these tests are associated with lower detection and higher false positive rates.    DIABETES MELLITUS    HGBA1C:    Lab Results   Component Value Date    A1C  6.0 (H) 2025    A1C 6.0 (A) 2025    A1C 6.2 (A) 2025     2025       We discussed the risks associated with pregestational diabetes.  There is an increased risk of congenital malformations, fetal growth disturbances, preeclampsia, spontaneous  and intrauterine fetal demise (IUFD).  Infants of diabetic mothers (IDMs) are also at increased risk for hypoglycemia and hyperbilirubinemia.    Congenital Malformations:  Data from multiple studies have consistently shown a higher risk of major congenital malformations and miscarriage associated with increasing first trimester glycated hemoglobin values. Although glycated hemoglobin values from different laboratories may not be comparable because of differences in methodology and a lack of standardization among laboratories, a value >1 percent above the upper limit of the normal range is associated with an increased risk of congenital anomalies.  Patients with a markedly elevated glycated hemoglobin value have a markedly increased risk of congenital anomalies, particularly neural tube and cardiac defects. I informed the patient that more information about fetal development will be obtained from first and second trimester sonographic examinations and maternal serum analyte results.     Fetal Growth Disturbances:   Maternal diabetes mellitus may double the incidence of LGA infants; it also changes the anthropometric measurements of infants of diabetic mothers (IDMs) compared with offspring of women without diabetes. Specifically, the chest-to-head and shoulder-to-head ratios are increased in IDMs.  LGA fetuses are at increased risk for a prolonged second stage of labor, shoulder dystocia, operative delivery, maternal and infant birth trauma, and  death. Maternal diabetes mellitus increases the likelihood of shoulder dystocia two- to six-fold compared to the population without diabetes and increases the likelihood of  dystocia-associated fetal morbidity, such as brachial plexus injury. Accelerated fetal growth is also associated with an increased risk of  metabolic and physiologic disturbances. Continued control of blood glucose concentration during the third trimester is important to minimize the risk of these complications.   Impaired fetal growth is more common among women with diabetic vasculopathy and/or superimposed preeclampsia. It is associated with increased fetal and  morbidity and mortality, and has long-term health implications.  If there is evidence of intrauterine growth restriction, which is uncommon, but often related to preeclampsia or preexisting maternal vasculopathy, tests of fetal well-being are initiated.    Preeclampsia:  The incidences of hypertension and preeclampsia are increased in pregnant women with diabetes and are related to pregestational hypertension and vascular and renal disease. Poor glycemic control also appears to play a role.  Diagnosis and management of preeclampsia are similar to that in women without diabetes, except among those who enter pregnancy with preexisting nephropathy. In these women, diagnosing preeclampsia can be difficult and requires relying on deterioration of other markers.    Intrauterine Fetal Death (IUFD):  Intrauterine fetal demise is now a rare complication of diabetic pregnancy, primarily due to achievement of good glycemic control. The fetus of the diabetic mother is at risk for hypoxia primarily from two mechanisms: (1) fetal hyperglycemia and hyperinsulinemia increase fetal oxygen consumption, which may induce fetal hypoxemia and acidosis if the oxygen needs of the fetus are not met and (2) maternal vasculopathy and hyperglycemia can lead to reduced uteroplacental perfusion, which may be associated with reduced fetal growth.  The American College of Obstetricians and Gynecologists (ACOG) recommends antepartum fetal testing for pregnancies  complicated by pregestational diabetes. There are no data from large or randomized trials on which to make an evidenced-based recommendation as to which pregnancies complicated by diabetes should undergo fetal surveillance, when to start, what test to order, or how often to perform it.   Most experts agree starting weekly NST's at 32 weeks of gestation and increasing the frequency of testing to two times per week from 36 weeks until delivery. In complicated patients (IUGR, oligohydramnios, preeclampsia, or poorly controlled blood glucose concentrations) testing may start earlier in gestation and is performed more frequently. Any significant deterioration in maternal status necessitates reevaluation of the fetus. The frequency of intrauterine fetal death (excluding congenital malformations) with such protocols is approximately 3 per 1000 pregnancies in women with type 1 diabetes.    Glycemic Control:  We discussed goals of optimal glucose control: Fasting levels between 60 - 95, and - hour postprandial values of less than 120.  Blood sugars should be check at least 4 times daily and should be sent to the endocrine.     Maternal Testing:  Recommendations for pregnancy testing were discussed with the patient. I advised regular  opthomalogical evaluation (with repeat evaluation as indicated), ECG, 24 hour urine collection for protein and creatinine clearance, as well as serum labs including a complete metabolic profile, CBC and HgB A1C.    Fetal Evaluation:  Fetal testing was also reviewed with the patient.  First trimester dating and early anatomic assessment is advised as well as a targeted ultrasound at 20-22 weeks gestation and a fetal echocardiogram at 22-24 weeks gestation.  In addition, growth ultrasounds should be performed every 4 weeks in the third trimester.  Finally, weekly NST evaluations should be initiated at 32 weeks gestation and increased to twice weekly at 36 weeks.      Glucose Control:  Managed by  endocrine        Prevention of Preeclampsia    Antiplatelet Agents  The observation that preeclampsia is associated with increased platelet turnover and increased platelet-derived thromboxane levels led to randomized trials evaluating low-dose aspirin therapy in women thought to be at increased risk of the disease. As opposed to higher dose aspirin therapy, low-dose aspirin (60 to 150 mg per day) diminishes platelet thromboxane synthesis while maintaining vascular wall prostacyclin synthesis. Although not well studied, the beneficial effect of low-dose aspirin for prevention of preeclampsia may also be in part related to modulation of inflammation. The drug has been studied for both prevention of preeclampsia and prevention of progression from mild to severe disease. It appears to result in a modest reduction in risk of preeclampsia when given to women at moderate to high risk of preeclampsia.  This approach has been studied in over 35,000 women, for both prevention of preeclampsia and prevention of progression of the disease. Low dose aspirin has a modest impact on pregnancy outcome; it reduces the risk of preeclampsia, as well as other adverse pregnancy outcomes (eg,  delivery, fetal growth restriction) by about 10 to 20 percent. Low dose aspirin is safe in pregnancy; thus, it is a reasonable strategy in women with a moderate to high risk of preeclampsia in whom the benefits outweigh the risks.     Clinical Guidelines  Based on the available data, low-dose aspirin is advised for women at high risk for preeclampsia. There is no consensus on the criteria that confer high risk. The [place] Preventive Services Task Force (USPSTF) risk criteria are reasonable.  USPSTF criteria for high risk include one or more of the following:   Previous pregnancy with preeclampsia, especially early onset and with an adverse outcome   Multifetal gestation  Chronic hypertension   Type 1 or 2 diabetes mellitus  Chronic  kidney disease  Autoimmune disease (antiphospholipid syndrome, systemic lupus erythematosus)     Women with multiple moderate risk factors for preeclampsia are considered high risk, as well. Identification of women with an appropriate combination of moderate risk factors to be considered high risk is subjective and determined case by case, as the data describing the magnitude of the association between each of these risk factors and development of preeclampsia vary widely and lack consistency. USPSTF criteria for moderate risk include:  Nulliparity  Obesity (body mass index >30 kg/m2)  Family history of preeclampsia in mother or sister  Age >=35 years  Sociodemographic characteristics (, low socioeconomic level)  Personal risk factors (eg, history of low birth weight or small for gestational age, previous adverse pregnancy outcome, >10 year pregnancy interval)     If used, daily low-dose aspirin should be initiated in the 12th or 13th week of gestation, although adverse effects from earlier initiation (eg, preconception) have not been documented. The optimum low dose is unclear; 81 mg is readily available, but 100 or 150 mg (which are not available in some countries including the [place] may be more effective.  In some pregnant women, platelet function is not inhibited by the 81 mg dose but is altered by higher dosing. Hence, current evidence has suggested a daily dose of 100 to 150 mg of aspirin rather than a lower dose. In the [place], this can be achieved by taking one and one-half 81 mg tablets; however, taking one 81 mg tablet is also reasonable since this is the commercially available dose and has proven efficacy. For prevention of preeclampsia, no trials have evaluated the efficacy of a higher dose of aspirin, which could be achieved easily by taking two 81 mg tablets or splitting a 325 mg tablet. For prevention of myocardial infarction and stroke in nonpregnant individuals, aspirin  appears to be equally effective at doses between 75 and 325 mg per day.  The safety of low-dose aspirin use in the second and third trimesters is well established, but questions linger regarding use in the first trimester (possible increase in minor vaginal bleeding or gastroschisis). Early therapy (before 16 weeks) may be important since the pathophysiologic features of preeclampsia develop at this time, weeks before clinical disease is apparent. However, available evidence is inconsistent about the importance of early initiation of therapy, possibly because aspirin has major effects on prostacyclin production and endothelial function throughout gestation.  Aspirin is discontinued 5 to 10 days before expected delivery to diminish the risk of bleeding during delivery; however, no adverse maternal or fetal effects related to low-dose aspirin have been proven.  Major questions remain as to which, if any, subgroups of women are more likely to benefit from low-dose aspirin therapy, when treatment should be started (first or second trimester), and the optimum dose to inhibit placental PGH synthase (cyclooxygenase) and also allow the anti-inflammatory effects of low-dose aspirin.     OBESITY:  Her BMI prior to pregnancy was 37  Obesity during pregnancy is associated with numerous maternal and  risks.  It is not clear whether obesity is a direct cause of adverse pregnancy outcome or whether the association between obesity and adverse pregnancy outcome is due to factors such as diabetes mellitus.   Data suggest that obese women should be encouraged to undertake a weight reduction program (diet, exercise, behavior modification, and possibly bariatric surgery in some cases) prior to attempting to conceive.            Subfertility in obese women is most commonly related to ovulatory dysfunction, and, in some obese women, the ovulatory dysfunction is related to polycystic ovary syndrome (PCOS). It is also important to  note that even among ovulatory women, increasing obesity is associated with decreasing spontaneous pregnancy rates.  The increased risk of miscarriage in obese women may be because such women often have PCOS or isolated insulin resistance.                 Due to its strong association with obesity in the general population, type 2 diabetes mellitus is one of the two most common medical complications of the obese . The increased risk of type 2 diabetes is primarily related to an exaggerated increase in insulin resistance in the obese state. It is reasonable to screen obese gravidas for undiagnosed pregestational diabetes in the first trimester.   Glucose intolerance associated with gestational diabetes generally resolves postpartum; however, obese women with a history of gestational diabetes have a two-fold increased prevalence of subsequent type 2 diabetes.           An association between obesity and hypertensive disorders during pregnancy has been consistently reported.  In particular, maternal weight and BMI are independent risk factors for preeclampsia.             Studies have found that the increased risk of  birth in obese gravidas is primarily associated with obesity-related medical and  complications, rather than an intrinsic predisposition to spontaneous  birth. Prevention of  birth in these patients, therefore, should be directed toward prevention or management of medical and obstetrical complications.               Both prepregnancy obesity and excessive maternal weight gain before or during pregnancy contribute to an increased probability of  delivery.  It has also been hypothesized that obesity may lead to dystocia due to increased soft tissue deposition in the maternal pelvis.    delivery in the obese  is associated with numerous perioperative concerns, including emergency delivery, prolonged incision to delivery interval, blood loss >1000  mL, longer operative times, wound infection, thromboembolism, and endometritis.            Maternal obesity appears to be associated with a small increase in the absolute rate of some congenital anomalies (primarily neural tube defect and cardiac), and the risk may increase with increasing maternal weight.  Level II ultrasound is advised for women with obesity.  The risk of neural tube defects increased significantly with maternal weight.    The analysis found that overweight and obese pregnant women experienced significantly more stillbirths than normal weight women.  Third trimester  testing is advised.            PRIOR  BIRTH  History  History  The patient had prior  PROM in a twin gestation.  Given pre-viable PROM EAB was elected.    Discussion:   birth refers to delivery prior to 37 weeks of gestation. It complicates 12 percent of deliveries in the [place], but accounts for over 85 percent of all  morbidity and mortality. Approximately 20 percent of  deliveries are iatrogenic and are performed for maternal or fetal indications, such as intrauterine growth restriction, preeclampsia, placenta previa, or nonreassuring fetal testing. Of the remainder, approximately 30 percent occur in the setting of  premature rupture of the membranes, 20 to 25 percent result from intraamniotic infection, and 25 to 30 percent are due to spontaneous (unexplained)  labor.           While the ability of obstetric care providers to identify women at risk for  birth has improved over the past three decades, prevention of  birth has not been successful and the incidence of  delivery has actually increased.  Fortunately,  outcome has improved during this interval as a result of widespread use of antepartum corticosteroid administration and advances in  care (eg, exogenous surfactant treatment, new methods of mechanical ventilation).               Risk factors associated with spontaneous  labor and delivery include maternal medical or obstetrical conditions and demographic variables such as age, race, employment, and socioeconomic status.   Multiple births are six times as likely to be  as castaneda births in the [place].    labor can be a complication of infections unrelated to the genital tract, such as appendicitis, cholecystitis, pneumonia, periodontal disease, and pyelonephritis. This likely results from transient low-grade bacteremia and/or endotoxemia leading to systemic inflammatory and immune responses that result in  labor.   Prevention as well as early detection and treatment of some of these types of infection can potentially prevent some  births.  Pregnant women with asymptomatic bacteriuria should be treated with antibiotics to reduce the risk of PTD.                 Maternal substance abuse increases the risk of  birth. Cocaine is the most common illicit substance associated with  labor in the [place], and has been detected in approximately 60 percent of women in  labor who have positive toxicology tests. Alcohol and toluene are additional substances associated with an increased risk of  labor.               Cervical insufficiency complicates 0.1 to 2 percent of all pregnancies, and is estimated to be responsible for approximately 15 percent of late second trimester losses.          A woman with an uncomplicated pregnancy who is employed where there are no greater potential hazards than those encountered in routine daily life may continue to work without interruption until the onset of labor. However, the physical demands of the woman's job should be considered, especially in women at higher risk of  delivery. A meta-analysis of 21 studies including [id number] women identified a high cumulative work fatigue score as the strongest (OR 1.63) work-related risk  factor for  birth.          The highest risk of  birth appears to be with interpregnancy interval of less than six months.       We discussed the morbidity and mortality associated with prematurity at various gestational ages.  The signs and symptoms of  labor were discussed at length with her and her .  We talked about potential risks and benefits associated with tocolytic therapy and  steroid.            We discussed the preventive therapy for  labor and  delivery with weekly 17-OH progesterone caproate weekly beginning between 16-20 weeks of gestation and continuing up to 36 weeks or until delivery. This therapy had been shown to reduce the chance of  births in subsequent pregancies by as much as 33%. It was recommended to patients with history of  delivery , but emerging data have not confirmed the efficacy reported in earlier trials. In a multicenter international trial (PROLONG) including over 1700 women with a castaneda gestation and past history of sPTB, hydroxyprogesterone caproate injections (Ruhenstroth) did not reduce PTB <35 weeks compared with placebo (11.0 versus 11.5 percent). After review of these findings, a [place] Food and Drug Administration advisory committee recommended withdrawing approval for Ruhenstroth. Thus, I currently advise intravaginal natural progesterone in patients with a history of sPTB.  Some providers continue to offer hydroxyprogesterone caproate with shared decision-making, while others use intravaginal natural progesterone in patients with a history of sPTB, pending data from additional trials and revised guidance from major obstetric organizations.   Given the potential for cervical insufficiency, cervical surveillance is also advised .    Limitations of ultrasound reviewed.    Conception by IVF is associated with an increased incidence of several obstetrical and  complications. Most of these are related to the high  incidence of multiple gestations.  The precise reasons for this increase in adverse outcomes are not clear.        ART is associated with an up to two-fold increased risk of  birth and low birth weight in ryan pregnancies.  ART does not appear to be an independent risk factor for adverse neurodevelopment outcome.  ART appears to be at increased risk of delivering offspring with congenital malformations compared with fertile women who conceive naturally.     Heart defects have been reported as high at 6 % so fetal echocardiography is recommended in all IVF patients. Stillbirth and  mortality rates appear to be increased as much as four-fold.       Ryan ART pregnancies, the relative risk of common pregnancy complications such as fetal growth restriction, preeclampsia, prematurity and  mortality are increased.    PGTA low risk.  Almost 34 at time of egg retrieval.    IMPRESSION:  IUP at 12w4d   AMA declines aneuploidy screening and invasive testing.  Pregestational diabetes type 2 --managed by endocrine  IVF pregnancy--PGTA low risk  Morbid obesity  Prior  x 1    RECOMMENDATIONS:  Continue care with Dr. Baird  Cervical length and limited ultrasound at 16, 18, 22 weeks  Level II & repeat cervical length ultrasound at 20 weeks  Four times daily capillary blood glucose assessments (fasting and 2 hour postprandial)  Weekly endocrine review of capillary blood glucose values  Fetal echocardiogram at 24 weeks  Follow-up growth ultrasound every 4 weeks in the third trimester with BPP at or beyond 32 weeks  Weekly NSTs at 32 weeks; twice weekly NST's at 34 weeks  Delivery at 38-39  weeks advised for medication controlled diabetes  EKG  Ophthalmology exam      Insulin per endocrinology  Aspirin 162 mg daily        Thank you for allowing me to participate in the care of your patient.  Please do not hesitate to contact me if additional questions or concerns arise.      Montserrat Gerber,  M.D.    40 minutes spent in review of records, patient consultation, documentation and coordination of care.  The relevant clinical matter(s) are summarized above.     Note to patient and family  The 21st Century Cures Act makes medical notes available to patients in the interest of transparency.  However, please be advised that this is a medical document.  It is intended as hhru-of-yeje communication.  It is written and medical language may contain abbreviations or verbiage that are technical and unfamiliar.  It may appear blunt or direct.  Medical documents are intended to carry relevant information, facts as evident, and the clinical opinion of the practitioner.         [1] No Known Allergies

## 2025-03-18 ENCOUNTER — HOSPITAL ENCOUNTER (OUTPATIENT)
Dept: PERINATAL CARE | Facility: HOSPITAL | Age: 35
Discharge: HOME | End: 2025-03-18
Attending: OBSTETRICS & GYNECOLOGY
Payer: COMMERCIAL

## 2025-03-18 VITALS
WEIGHT: 222 LBS | BODY MASS INDEX: 39 KG/M2 | SYSTOLIC BLOOD PRESSURE: 122 MMHG | DIASTOLIC BLOOD PRESSURE: 75 MMHG | HEART RATE: 78 BPM

## 2025-03-18 DIAGNOSIS — Z36.9 ENCOUNTER FOR ANTENATAL SCREENING OF MOTHER (HCC): ICD-10-CM

## 2025-03-18 DIAGNOSIS — Z36.9 ENCOUNTER FOR ANTENATAL SCREENING OF MOTHER (HCC): Primary | ICD-10-CM

## 2025-03-18 DIAGNOSIS — Z98.891 PREVIOUS CESAREAN SECTION: ICD-10-CM

## 2025-03-18 DIAGNOSIS — Z87.59 HISTORY OF PRETERM PREMATURE RUPTURE OF MEMBRANES (PPROM): ICD-10-CM

## 2025-03-18 DIAGNOSIS — O09.811 PREGNANCY RESULTING FROM IN VITRO FERTILIZATION IN FIRST TRIMESTER (HCC): ICD-10-CM

## 2025-03-18 DIAGNOSIS — E11.9 TYPE 2 DIABETES MELLITUS WITHOUT COMPLICATION, WITH LONG-TERM CURRENT USE OF INSULIN (HCC): ICD-10-CM

## 2025-03-18 DIAGNOSIS — O99.211 MATERNAL MORBID OBESITY IN FIRST TRIMESTER, ANTEPARTUM (HCC): ICD-10-CM

## 2025-03-18 DIAGNOSIS — Z78.9 CONCEIVED BY IN VITRO FERTILIZATION: ICD-10-CM

## 2025-03-18 DIAGNOSIS — O09.521 MULTIGRAVIDA OF ADVANCED MATERNAL AGE IN FIRST TRIMESTER (HCC): ICD-10-CM

## 2025-03-18 DIAGNOSIS — Z79.4 TYPE 2 DIABETES MELLITUS WITHOUT COMPLICATION, WITH LONG-TERM CURRENT USE OF INSULIN (HCC): ICD-10-CM

## 2025-03-18 DIAGNOSIS — E66.01 MATERNAL MORBID OBESITY IN FIRST TRIMESTER, ANTEPARTUM (HCC): ICD-10-CM

## 2025-03-18 DIAGNOSIS — O24.319 PREGESTATIONAL DIABETES MELLITUS, MODIFIED WHITE CLASS B (HCC): ICD-10-CM

## 2025-03-18 DIAGNOSIS — O09.521 AMA (ADVANCED MATERNAL AGE) MULTIGRAVIDA 35+, FIRST TRIMESTER (HCC): ICD-10-CM

## 2025-03-18 PROCEDURE — 76813 OB US NUCHAL MEAS 1 GEST: CPT | Performed by: OBSTETRICS & GYNECOLOGY

## 2025-03-20 ENCOUNTER — OFFICE VISIT (OUTPATIENT)
Dept: ENDOCRINOLOGY CLINIC | Facility: CLINIC | Age: 35
End: 2025-03-20
Payer: COMMERCIAL

## 2025-03-20 ENCOUNTER — PATIENT MESSAGE (OUTPATIENT)
Dept: OBGYN CLINIC | Facility: CLINIC | Age: 35
End: 2025-03-20

## 2025-03-20 VITALS
WEIGHT: 224 LBS | HEIGHT: 62 IN | HEART RATE: 78 BPM | BODY MASS INDEX: 41.22 KG/M2 | SYSTOLIC BLOOD PRESSURE: 132 MMHG | DIASTOLIC BLOOD PRESSURE: 80 MMHG

## 2025-03-20 DIAGNOSIS — O24.112 PREGNANCY COMPLICATED BY PRE-EXISTING TYPE 2 DIABETES IN SECOND TRIMESTER (HCC): Primary | ICD-10-CM

## 2025-03-20 LAB
GLUCOSE BLOOD: 108
HEMOGLOBIN A1C: 5.5 % (ref 4.3–5.6)
TEST STRIP LOT #: NORMAL NUMERIC

## 2025-03-20 PROCEDURE — 82947 ASSAY GLUCOSE BLOOD QUANT: CPT

## 2025-03-20 PROCEDURE — 99213 OFFICE O/P EST LOW 20 MIN: CPT

## 2025-03-20 PROCEDURE — 83036 HEMOGLOBIN GLYCOSYLATED A1C: CPT

## 2025-03-20 NOTE — TELEPHONE ENCOUNTER
Managed by Dr. Claudine Reyes reviewed sugar log. They are increasing her bedtime insulin. 12+10+12+45L

## 2025-03-20 NOTE — PROGRESS NOTES
Name: Colette Mantilla Date: 3/20/25  Referring Physician: No ref. provider found  Dr. Emeli Sagastume - fertility specialist    CHIEF COMPLAINT   Follow up for DM      HISTORY OF PRESENT ILLNESS   Colette Mantilla  is a 31 year old female who presents for follow up on diabetes management.  5/2022 was seen during twin pregnancy which unfortunately did end in loss due to PPROM at 20 weeks.   Fertility treatments had been on hold for 6 months over the last year. She was transitioned off insulin and we started Mounjaro to help with glycemic control and weight loss in hopes of resuming fertility treatments. Was able to lose almost 50lbs with Mounjaro prior to resuming fertility treatments. Now off Mounjaro and has had a successful transfer with frozen embryo.  She is now 13 weeks pregnant with second child following successful IVF frozen embryo transfer.   FAMILY HISTORY OF DIABETES  -father- DM type 2  -Maternal grandmother- DM type 2  -Paternal grandmother- DM type 2    DIABETES HISTORY  Diagnosed: GDM with prior pregnancy in 2017, progressed to type 2 diabetes controlled with diet.   Prior HbA, C or glycohemoglobin were 6/2018-6.0%; 10/2020- 7.8%; 1/2021- 6.7%; 3/2022- 7.6%; 6.2% 5/2022; 7.2% 1/2023; 5.6% 5/2023; 6.0% 8/2023; 5.6% 1/2024; 5.8% 6/2024; 6.2% 1/2025; 6.0% 2/2025; 5.5% POC today   Previous DM medications:   Mounjaro 12.5mg subcutaneous weekly- now stopped since resuming fertility treatment and now subsequent pregnancy.   CURRENT MEDICATIONS FOR DM:  NPH- 42 units subcutaneous daily at bedtime.   Novolog- 12-10-12 TID with meals   HOME GLUCOSE READINGS:   Hypoglycemia: Infrequent since last visit- occ. Fasting   Meter or log book today: Checking 3-4 times daily- fasting and post prandial readings  Per recall:  Fasting- 105, 107, 110, 101, 102, 107  2hrs post breakfast- 100, 115, 96, 109, 94, 108  2hrs post lunch- 78, 105, 120, 89, 78  2hrs post dinner- 104, 125, 102, 112, 125    HISTORY OF DIABETES  COMPLICATIONS:  History of Retinopathy: No - last eye exam within the last 12 months: Yes- 9/2024 Had appt with Dr. Blackburn next month   History of Neuropathy: No, denies symptoms  History of Nephropathy: No  ASSOCIATED COMPLICATIONS:   HTN: No   Hyperlipidemia: No   Coronary Artery Disease: No  Cerebrovascular Disease: No  Peripheral Vascular Disease: No    DIETARY COMPLIANCE:  Very Good- still watching carbohydrates and incorporating protein in meals   Breakfast- 2 hard boiled eggs with sausage, low carb egg wrap and ezekial bread, or sometimes skipping   Lunch- turkey Hummus wrap with berries   Dinner- side salad with chicken or chickpea pasta   Beverages- water, tea   Snacks- wheat crackers with cheese, yogurt  EXERCISE:   Has started walking - typically at least once daily     ROS:  Polyuria, polyphagia, polydipsia: no  Paresthesias: no, denies symptoms   Blurred vision: no  Recent steroids, illness or infections: No   Osteoporosis- No  Thyroid disease- No  MEDICATIONS:   Current Outpatient Medications on File Prior to Visit   Medication Sig Dispense Refill    Insulin NPH, Human,, Isophane, (NOVOLIN N FLEXPEN) 100 UNIT/ML Subcutaneous Suspension Pen-injector Inject 40 Units into the skin daily. (Patient taking differently: Inject 42 Units into the skin daily.) 36 mL 1    insulin aspart 100 UNIT/ML Subcutaneous Solution Cartridge Inject 12 Units into the skin once. 12 units with breakfast, 10 units with lunch, 12 units with dinner      dexamethasone 0.5 MG Oral Tab Take 0.5 tablets (0.25 mg total) by mouth daily with breakfast.      BABY ASPIRIN OR Take 1 tablet by mouth daily.      CONTOUR NEXT TEST In Vitro Strip USE TO TEST BLOOD SUGARS 4 TIMES DAILY 400 strip 0    Insulin Pen Needle (BD PEN NEEDLE PARAMJIT 2ND GEN) 32G X 4 MM Does not apply Misc Use to inject insulin three times a day 300 each 0    folic acid 1 MG Oral Tab Take by mouth daily.      acidophilus-pectin Oral Cap Take 1 capsule by mouth daily.  FERTILITY PROBIOTIC (Patient not taking: Reported on 2/27/2025)      Microlet Lancets Does not apply Misc Use to test blood sugars 4 times daily 400 each 0    Glucose Blood (ONETOUCH VERIO) In Vitro Strip TEST 4 TIMES A  strip 3    Insulin Pen Needle (BD PEN NEEDLE ORIGINAL U/F) 29G X 12.7MM Does not apply Misc Use to inject insulin 4 times daily 400 each 0    OneTouch Delica Lancets 33G Does not apply Misc 4 x daily 200 each 3    Blood Glucose Monitoring Suppl (ONETOUCH VERIO FLEX SYSTEM) w/Device Does not apply Kit 1 kit in the morning, at noon, in the evening, and at bedtime. May substitute per insurance formulary 1 kit 3    prenatal vitamin w/DHA 27-0.8-228 MG Oral Cap Take 1 capsule by mouth daily.      Vitamin D3, Cholecalciferol, 50 MCG (2000 UT) Oral Tab Take 1 tablet (2,000 Units total) by mouth daily.       No current facility-administered medications on file prior to visit.     ALLERGIES:   No Known Allergies    SOCIAL HISTORY:   Social History     Socioeconomic History    Marital status:      Spouse name: Not on file    Number of children: Not on file    Years of education: Not on file    Highest education level: Not on file   Occupational History    Not on file   Tobacco Use    Smoking status: Never    Smokeless tobacco: Never   Substance and Sexual Activity    Alcohol use: Never    Drug use: No    Sexual activity: Yes     Partners: Male     Birth control/protection: OCP   Other Topics Concern    Not on file   Social History Narrative    Not on file     Social Drivers of Health     Food Insecurity: No Food Insecurity (2/20/2025)    NCSS - Food Insecurity     Worried About Running Out of Food in the Last Year: No     Ran Out of Food in the Last Year: No   Transportation Needs: No Transportation Needs (2/20/2025)    NCSS - Transportation     Lack of Transportation: No   Stress: No Stress Concern Present (2/20/2025)    Stress     Feeling of Stress : No   Housing Stability: Not At Risk  (2025)    NCSS - Housing/Utilities     Has Housing: Yes     Worried About Losing Housing: No     Unable to Get Utilities: No     PAST MEDICAL HISTORY:   Past Medical History:    Diabetes (HCC)    History of varicella as a child    Infertility, female     PAST SURGICAL HISTORY:   Past Surgical History:   Procedure Laterality Date      2017    Cholecystectomy      Lasik Bilateral ~    Dr. Nathan Garcia        PHYSICAL EXAM:   Vitals:    25 1027   BP: 132/80   Pulse: 78       General Appearance:  alert, well developed, in no acute distress  Eyes:  pupils are equal, round and reactive. Normal conjunctivae and normal sclera   Neck: Trachea midline: Normal.   Back: no kyphosis or back tenderness  Lymph Nodes:  No abnormal nodes noted  Musculoskeletal:  normal muscle strength and tone  Skin:  normal moisture and skin texture  Hair & Nails:  normal scalp hair     Hematologic:  no excessive bruising  Neuro:  sensory grossly intact and motor grossly intact  Psychiatric:  oriented to time, self, and place  Nutritional:  no abnormal weight gain or loss    LABS: Pertinent labs reviewed    ASSESSMENT/PLAN:    Diabetes mellitus type 2 controlled  -HgA1c- 5.5% - at goal   -Congratulated patient on continued improved glycemic control and on positive pregnancy test  -Reviewed with patient the pathogenesis of diabetes, clinical significance of A1c, and common complications such as: microvascular, macrovascular and diabetic ketoacidosis. Patient verbalizes understanding of the importance of glycemic control and the goals of therapy.   -Discussed with patient glucose targets ranges for preconception and pregnancy (Fasting <90 and 2hr post prandial <120).   -reviewed limiting carbs to 165gm of carbs/day.   -discussed importance of incorporating low carb snacks during the day and at bedtime  -reviewed blood sugar target goals: fasting <90 and 2hrs post prandial <120  -reviewed necessity to send us her BG readings  every 4-7 days for review during pregnancy  -Discussed dangers of uncontrolled diabetes in pregnancy and possible fetal complications.   -Discussed importance of glycemic control for maternal and fetal health  -Discussed insulin injection site rotation.   - Increase NPH to 44 units subcutaneous daily.   - Continue Novolog 12-10-12 TID with meals. Reviewed insulin timing and administration.  - We had used 24 hour basal insulin previously but she felt fasting sugars better controlled with NPH  -Continue to check sugars 4 times daily- fasting and post prandial    -She will send glucose readings via [employer] in 1 week.  -normotensive   -lipids 6/2024- LDL slightly elevated at 108. Will address after pregnancy  -no nephropathy 6/2024  -Reviewed importance of yearly optho follow up - UTD with optho  -normal foot exam 6/2024  - Reviewed monthly endocrine follow up during pregnancy.  -She is not planning to breastfeed at this time- would like to consider restarting Mounjaro after delivery.     RTC in 6 weeks  [facility] [place]  3/20/25  A total of  25 minutes was spent on obtaining history, reviewing pertinent imaging/labs and specialists notes, evaluating patient, providing multiple treatment options, reinforcing diet/exercise and compliance, and completing documentation.

## 2025-03-26 ENCOUNTER — PATIENT MESSAGE (OUTPATIENT)
Dept: OBGYN CLINIC | Facility: CLINIC | Age: 35
End: 2025-03-26

## 2025-03-26 ENCOUNTER — PATIENT MESSAGE (OUTPATIENT)
Dept: ENDOCRINOLOGY CLINIC | Facility: CLINIC | Age: 35
End: 2025-03-26

## 2025-03-28 ENCOUNTER — LAB ENCOUNTER (OUTPATIENT)
Dept: LAB | Facility: HOSPITAL | Age: 35
End: 2025-03-28
Attending: OBSTETRICS & GYNECOLOGY
Payer: COMMERCIAL

## 2025-03-28 ENCOUNTER — ROUTINE PRENATAL (OUTPATIENT)
Dept: OBGYN CLINIC | Facility: CLINIC | Age: 35
End: 2025-03-28
Payer: COMMERCIAL

## 2025-03-28 ENCOUNTER — PATIENT MESSAGE (OUTPATIENT)
Dept: OBGYN CLINIC | Facility: CLINIC | Age: 35
End: 2025-03-28

## 2025-03-28 ENCOUNTER — TELEPHONE (OUTPATIENT)
Dept: OBGYN CLINIC | Facility: CLINIC | Age: 35
End: 2025-03-28

## 2025-03-28 ENCOUNTER — PATIENT MESSAGE (OUTPATIENT)
Dept: INTERNAL MEDICINE CLINIC | Facility: CLINIC | Age: 35
End: 2025-03-28

## 2025-03-28 VITALS
WEIGHT: 224 LBS | HEART RATE: 93 BPM | DIASTOLIC BLOOD PRESSURE: 80 MMHG | SYSTOLIC BLOOD PRESSURE: 123 MMHG | BODY MASS INDEX: 41 KG/M2

## 2025-03-28 DIAGNOSIS — Z34.92 ENCOUNTER FOR SUPERVISION OF NORMAL PREGNANCY IN SECOND TRIMESTER, UNSPECIFIED GRAVIDITY (HCC): Primary | ICD-10-CM

## 2025-03-28 DIAGNOSIS — O24.312: ICD-10-CM

## 2025-03-28 DIAGNOSIS — E11.9 TYPE 2 DIABETES MELLITUS WITHOUT COMPLICATION, WITH LONG-TERM CURRENT USE OF INSULIN (HCC): Primary | ICD-10-CM

## 2025-03-28 DIAGNOSIS — Z34.92 ENCOUNTER FOR SUPERVISION OF NORMAL PREGNANCY IN SECOND TRIMESTER, UNSPECIFIED GRAVIDITY (HCC): ICD-10-CM

## 2025-03-28 DIAGNOSIS — Z79.4 TYPE 2 DIABETES MELLITUS WITHOUT COMPLICATION, WITH LONG-TERM CURRENT USE OF INSULIN (HCC): Primary | ICD-10-CM

## 2025-03-28 LAB
APPEARANCE: CLEAR
ATRIAL RATE: 75 BPM
BILIRUBIN: NEGATIVE
GLUCOSE (URINE DIPSTICK): NEGATIVE MG/DL
KETONES (URINE DIPSTICK): NEGATIVE MG/DL
LEUKOCYTES: NEGATIVE
MULTISTIX LOT#: NORMAL NUMERIC
NITRITE, URINE: NEGATIVE
OCCULT BLOOD: NEGATIVE
P AXIS: 30 DEGREES
P-R INTERVAL: 142 MS
PH, URINE: 6 (ref 4.5–8)
Q-T INTERVAL: 394 MS
QRS DURATION: 94 MS
QTC CALCULATION (BEZET): 439 MS
R AXIS: 24 DEGREES
SPECIFIC GRAVITY: 1.02 (ref 1–1.03)
T AXIS: 18 DEGREES
URINE-COLOR: YELLOW
UROBILINOGEN,SEMI-QN: 0.2 MG/DL (ref 0–1.9)
VENTRICULAR RATE: 75 BPM

## 2025-03-28 PROCEDURE — 93005 ELECTROCARDIOGRAM TRACING: CPT

## 2025-03-28 PROCEDURE — 93010 ELECTROCARDIOGRAM REPORT: CPT | Performed by: INTERNAL MEDICINE

## 2025-03-28 PROCEDURE — 3074F SYST BP LT 130 MM HG: CPT | Performed by: OBSTETRICS & GYNECOLOGY

## 2025-03-28 PROCEDURE — 3079F DIAST BP 80-89 MM HG: CPT | Performed by: OBSTETRICS & GYNECOLOGY

## 2025-03-28 PROCEDURE — 81002 URINALYSIS NONAUTO W/O SCOPE: CPT | Performed by: OBSTETRICS & GYNECOLOGY

## 2025-03-28 NOTE — PROGRESS NOTES
Feeling well.  Taking bASA.  Insulin 12+10+12+45N.  Following with MFM and has CL scheduled.  Needs to see optho and get EKG.  Msg to RNs.  RTC 4 wks.

## 2025-03-28 NOTE — PROGRESS NOTES
Outpatient Maternal-Fetal Medicine Follow-Up    Dear Dr. hCow    Thank you for requesting ultrasound evaluation and maternal fetal medicine consultation on your patient Colette Mantilla.  As you are aware she is a 34 year old female  with a castaneda pregnancy and an Estimated Date of Delivery: 25.  She returned to maternal-fetal medicine today for a follow-up visit.  Her history was reviewed from her prior visit and there were no interval changes.    Antepartum Risk Factors  AMA declines aneuploidy screening and invasive testing.  Pregestational diabetes type 2 --managed by endocrine  IVF pregnancy--PGTA low risk  Morbid obesity  Prior  x 1    PHYSICAL EXAMINATION:  /60   Pulse 86   Wt 232 lb (105.2 kg)   LMP 2024 (Approximate)   BMI 42.43 kg/m²   General: alert and oriented, no acute distress  Abdomen: gravid, soft, non-tender  Extremities: non-tender, no edema    OBSTETRIC ULTRASOUND    Ultrasound Findings: poor view due to maternal body habitus and poor penetration  Single IUP in breech presentation.    Placenta is posterior.   Cardiac activity is present at 146 bpm  MVP is 3.4 cm  The cervix was not able to be clearly visualized and appeared short by transabdominal ultrasound, therefore transvaginal ultrasound was performed. Transvaginal US findings: Cervix is closed, long and no funneling seen measuring 41.7 mm     See PACS/Imaging Tab For Complete Ultrasound Report  I interpreted the results and reviewed them with the patient.    DISCUSSION  During her visit we discussed and reviewed the following issues:    PRIOR  BIRTH  History  History  The patient had prior  PROM in a twin gestation.  Given pre-viable PROM EAB was elected.     Discussion:  See prior [facility] notes for a detailed review.    Limitations of ultrasound reviewed.     IVF GESTATION  See prior [facility] notes for a detailed review.    PRE-GESTATIONAL DIABETES  Managed by endocrinology  See prior [facility] notes for a  detailed review.    ADVANCED MATERNAL AGE  See prior [facility] notes for a detailed review.  She did not desire invasive genetic testing.   She also declined screening and diagnostic testing for fetal aneuploidy.  She feels that she will not alter the management of her pregnancy even in the presence of a known  fetal aneuploidy.    IMPRESSION:  IUP at 16w1d  AMA declines aneuploidy screening and invasive testing.  Pregestational diabetes type 2 --managed by endocrine  IVF pregnancy--PGTA low risk  Morbid obesity  Prior  x 1  Prior 20 PROM and pre-viable delivery of twins     RECOMMENDATIONS:  Continue care with Dr. Chow  Cervical length and limited ultrasound at 18, 22 weeks  Level II & repeat cervical length ultrasound at 20 weeks  Four times daily capillary blood glucose assessments (fasting and 2 hour postprandial)  Weekly endocrine review of capillary blood glucose values  Fetal echocardiogram at 24 weeks  Follow-up growth ultrasound every 4 weeks in the third trimester with BPP at or beyond 32 weeks  Weekly NSTs at 32 weeks; twice weekly NST's at 34 weeks  Delivery at 38-39  weeks advised for medication controlled diabetes  EKG  Ophthalmology exam      Insulin per endocrinology  Aspirin 162 mg daily    Thank you for allowing me to participate in the care of your patient.  Please do not hesitate to contact me if additional questions or concerns arise.      Vincent Chino M.D.    30 minutes spent in review of records, patient consultation, documentation and coordination of care.  The relevant clinical matter(s) are summarized above.     Note to patient and family  The 21st Century Cures Act makes medical notes available to patients in the interest of transparency.  However, please be advised that this is a medical document.  It is intended as ctlx-ot-aijz communication.  It is written and medical language may contain abbreviations or verbiage that are technical and unfamiliar.  It may appear blunt or direct.   Medical documents are intended to carry relevant information, facts as evident, and the clinical opinion of the practitioner.

## 2025-03-28 NOTE — TELEPHONE ENCOUNTER
Order for EKG placed.  Patient informed and instructed to go to the lab for the test.  Patient advised to contact her insurance to find out what ophthalmologist is covered.

## 2025-03-31 ENCOUNTER — PATIENT MESSAGE (OUTPATIENT)
Dept: OBGYN CLINIC | Facility: CLINIC | Age: 35
End: 2025-03-31

## 2025-04-02 ENCOUNTER — PATIENT MESSAGE (OUTPATIENT)
Dept: OBGYN CLINIC | Facility: CLINIC | Age: 35
End: 2025-04-02

## 2025-04-10 ENCOUNTER — PATIENT MESSAGE (OUTPATIENT)
Dept: OBGYN CLINIC | Facility: CLINIC | Age: 35
End: 2025-04-10

## 2025-04-11 ENCOUNTER — HOSPITAL ENCOUNTER (OUTPATIENT)
Dept: PERINATAL CARE | Facility: HOSPITAL | Age: 35
Discharge: HOME | End: 2025-04-11
Attending: OBSTETRICS & GYNECOLOGY
Payer: COMMERCIAL

## 2025-04-11 VITALS
DIASTOLIC BLOOD PRESSURE: 60 MMHG | HEART RATE: 86 BPM | WEIGHT: 232 LBS | BODY MASS INDEX: 42 KG/M2 | SYSTOLIC BLOOD PRESSURE: 137 MMHG

## 2025-04-11 DIAGNOSIS — Z78.9 CONCEIVED BY IN VITRO FERTILIZATION: ICD-10-CM

## 2025-04-11 DIAGNOSIS — Z87.59 HISTORY OF PRETERM PREMATURE RUPTURE OF MEMBRANES (PPROM): ICD-10-CM

## 2025-04-11 DIAGNOSIS — O24.319 PREGESTATIONAL DIABETES MELLITUS, MODIFIED WHITE CLASS B (HCC): ICD-10-CM

## 2025-04-11 DIAGNOSIS — Z87.59 HISTORY OF PRETERM PREMATURE RUPTURE OF MEMBRANES (PPROM): Primary | ICD-10-CM

## 2025-04-11 PROCEDURE — 76817 TRANSVAGINAL US OBSTETRIC: CPT | Performed by: OBSTETRICS & GYNECOLOGY

## 2025-04-11 PROCEDURE — 76815 OB US LIMITED FETUS(S): CPT

## 2025-04-16 ENCOUNTER — PATIENT MESSAGE (OUTPATIENT)
Dept: OBGYN CLINIC | Facility: CLINIC | Age: 35
End: 2025-04-16

## 2025-04-16 ENCOUNTER — PATIENT MESSAGE (OUTPATIENT)
Dept: ENDOCRINOLOGY CLINIC | Facility: CLINIC | Age: 35
End: 2025-04-16

## 2025-04-16 DIAGNOSIS — O24.112 PREGNANCY COMPLICATED BY PRE-EXISTING TYPE 2 DIABETES IN SECOND TRIMESTER (HCC): Primary | ICD-10-CM

## 2025-04-16 RX ORDER — INSULIN ASPART 100 [IU]/ML
INJECTION, SOLUTION INTRAVENOUS; SUBCUTANEOUS
Qty: 36 ML | Refills: 1 | Status: SHIPPED | OUTPATIENT
Start: 2025-04-16

## 2025-04-16 NOTE — TELEPHONE ENCOUNTER
Endocrine refill protocol for rapid acting, regular, intermediate, and mixed insulin:  Novolog insulin, RX sent.  Protocol Criteria:  PASSED     If all below requirements are met, send a 90-day supply with 1 refill per provider protocol.    Verify appointment with Endocrinology completed in the last 6 months or scheduled in the next 3 months.  Verify A1C has been completed within the last 6 months and is below 8.5%    -May substitute prescriptions for Novolog and Humalog unless documented allergy (pens and vials) at the same dose and concentration per insurance preference and provider protocol.   -May substitute prescriptions for Novolin R and Humulin R unless documented allergy (pens and vials) at the same dose and concentration per insurance preference and provider protocol.   -May substitute prescriptions for Novolin N and Humulin N unless documented allergy (pens and vials) at the same dose and concentration per insurance preference and provider protocol.   -May substitute prescriptions for Humulin and Novolin 70/30 insulin unless documented allergy at the same dose and concentration per insurance preference and provider protocol.    Last completed office visit: 3/20/2025 Colette Jaeger, [facility]   Next scheduled Follow up: 5/14/25     Last A1C result: 5.5% done 3/20/2025.

## 2025-04-23 ENCOUNTER — PATIENT MESSAGE (OUTPATIENT)
Dept: ENDOCRINOLOGY CLINIC | Facility: CLINIC | Age: 35
End: 2025-04-23

## 2025-04-23 ENCOUNTER — PATIENT MESSAGE (OUTPATIENT)
Dept: OBGYN CLINIC | Facility: CLINIC | Age: 35
End: 2025-04-23

## 2025-04-23 NOTE — PROGRESS NOTES
Outpatient Maternal-Fetal Medicine Follow-Up    Dear Dr. Chow    Thank you for requesting ultrasound evaluation and maternal fetal medicine consultation on your patient Colette Mantilla.  As you are aware she is a 34 year old female  with a castaneda pregnancy and an Estimated Date of Delivery: 25.  She returned to maternal-fetal medicine today for a follow-up visit.  Her history was reviewed from her prior visit and there were no interval changes.    Antepartum Risk Factors  AMA declines aneuploidy screening and invasive testing.  Pregestational diabetes type 2 --managed by endocrine  IVF pregnancy--PGTA low risk  Morbid obesity  Prior  x 1    PHYSICAL EXAMINATION:  /75   Pulse 98   Wt 232 lb (105.2 kg)   LMP 2024 (Approximate)   BMI 42.43 kg/m²   General: alert and oriented, no acute distress  Abdomen: gravid, soft, non-tender  Extremities: non-tender, no edema    OBSTETRIC ULTRASOUND    Ultrasound Findings:  Single IUP in cephalic presentation.    Placenta is posterior.   A 3 vessel cord is noted.  Cardiac activity is present at 145 bpm  MVP is 4 cm    The cervix was not able to be clearly visualized and appeared short by transabdominal ultrasound, therefore transvaginal ultrasound was performed. Transvaginal US findings: Cervix is closed, long and no funneling seen measuring 42.4 mm     See PACS/Imaging Tab For Complete Ultrasound Report  I interpreted the results and reviewed them with the patient.    DISCUSSION  During her visit we discussed and reviewed the following issues:    PRIOR  BIRTH  History  History  The patient had prior  PROM in a twin gestation.  Given pre-viable PROM EAB was elected.     Discussion:  See prior [facility] notes for a detailed review.    Limitations of ultrasound reviewed.     IVF GESTATION  See prior [facility] notes for a detailed review.    PRE-GESTATIONAL DIABETES  Managed by endocrinology  See prior [facility] notes for a detailed review.    ADVANCED  MATERNAL AGE  See prior [facility] notes for a detailed review.  She did not desire invasive genetic testing.   She also declined screening and diagnostic testing for fetal aneuploidy.  She feels that she will not alter the management of her pregnancy even in the presence of a known  fetal aneuploidy.    IMPRESSION:  IUP at 18w0d  Normal cervical length  AMA declines aneuploidy screening and invasive testing.  Pregestational diabetes type 2 --managed by endocrine  IVF pregnancy--PGTA low risk  Morbid obesity  Prior  x 1  Prior 20 PROM and pre-viable delivery of twins     RECOMMENDATIONS:  Continue care with Dr. Chow  Cervical length and limited ultrasound at 22 weeks  Level II & repeat cervical length ultrasound at 20 weeks  Four times daily capillary blood glucose assessments (fasting and 2 hour postprandial)  Weekly endocrine review of capillary blood glucose values  Fetal echocardiogram at 24 weeks  Follow-up growth ultrasound every 4 weeks in the third trimester with BPP at or beyond 32 weeks  Weekly NSTs at 32 weeks; twice weekly NST's at 34 weeks  Delivery at 38-39  weeks advised for medication controlled diabetes  Ophthalmology exam if not current  Insulin per endocrinology  Aspirin 162 mg daily    Thank you for allowing me to participate in the care of your patient.  Please do not hesitate to contact me if additional questions or concerns arise.      Oriana Yanez M.D.    20 minutes spent in review of records, patient consultation, documentation and coordination of care.  The relevant clinical matter(s) are summarized above.     Note to patient and family  The 21st Century Cures Act makes medical notes available to patients in the interest of transparency.  However, please be advised that this is a medical document.  It is intended as [id number]-[id number] communication.  It is written and medical language may contain abbreviations or verbiage that are technical and unfamiliar.  It may appear blunt or direct.  Medical  documents are intended to carry relevant information, facts as evident, and the clinical opinion of the practitioner.

## 2025-04-24 ENCOUNTER — HOSPITAL ENCOUNTER (OUTPATIENT)
Dept: PERINATAL CARE | Facility: HOSPITAL | Age: 35
Discharge: HOME | End: 2025-04-24
Attending: OBSTETRICS & GYNECOLOGY
Payer: COMMERCIAL

## 2025-04-24 VITALS
SYSTOLIC BLOOD PRESSURE: 119 MMHG | WEIGHT: 232 LBS | BODY MASS INDEX: 42 KG/M2 | DIASTOLIC BLOOD PRESSURE: 75 MMHG | HEART RATE: 98 BPM

## 2025-04-24 DIAGNOSIS — Z87.59 HISTORY OF PRETERM PREMATURE RUPTURE OF MEMBRANES (PPROM): Primary | ICD-10-CM

## 2025-04-24 DIAGNOSIS — E11.9 TYPE 2 DIABETES MELLITUS WITHOUT COMPLICATION, WITH LONG-TERM CURRENT USE OF INSULIN (HCC): ICD-10-CM

## 2025-04-24 DIAGNOSIS — Z87.59 HISTORY OF PRETERM PREMATURE RUPTURE OF MEMBRANES (PPROM): ICD-10-CM

## 2025-04-24 DIAGNOSIS — Z98.891 PREVIOUS CESAREAN SECTION: ICD-10-CM

## 2025-04-24 DIAGNOSIS — O09.812 PREGNANCY RESULTING FROM ASSISTED REPRODUCTIVE TECHNOLOGY IN SECOND TRIMESTER (HCC): ICD-10-CM

## 2025-04-24 DIAGNOSIS — Z79.4 TYPE 2 DIABETES MELLITUS WITHOUT COMPLICATION, WITH LONG-TERM CURRENT USE OF INSULIN (HCC): ICD-10-CM

## 2025-04-24 DIAGNOSIS — Z78.9 CONCEIVED BY IN VITRO FERTILIZATION: ICD-10-CM

## 2025-04-24 DIAGNOSIS — O99.212 OTHER OBESITY DUE TO EXCESS CALORIES AFFECTING PREGNANCY IN SECOND TRIMESTER (HCC): ICD-10-CM

## 2025-04-24 DIAGNOSIS — O24.319 PREGESTATIONAL DIABETES MELLITUS, MODIFIED WHITE CLASS B (HCC): ICD-10-CM

## 2025-04-24 DIAGNOSIS — E66.09 OTHER OBESITY DUE TO EXCESS CALORIES AFFECTING PREGNANCY IN SECOND TRIMESTER (HCC): ICD-10-CM

## 2025-04-24 PROCEDURE — 76815 OB US LIMITED FETUS(S): CPT

## 2025-04-24 PROCEDURE — 76817 TRANSVAGINAL US OBSTETRIC: CPT | Performed by: OBSTETRICS & GYNECOLOGY

## 2025-04-25 ENCOUNTER — ROUTINE PRENATAL (OUTPATIENT)
Dept: OBGYN CLINIC | Facility: CLINIC | Age: 35
End: 2025-04-25
Payer: COMMERCIAL

## 2025-04-25 VITALS
WEIGHT: 233.81 LBS | DIASTOLIC BLOOD PRESSURE: 73 MMHG | HEART RATE: 77 BPM | SYSTOLIC BLOOD PRESSURE: 128 MMHG | BODY MASS INDEX: 43 KG/M2

## 2025-04-25 DIAGNOSIS — Z34.92 ENCOUNTER FOR SUPERVISION OF NORMAL PREGNANCY IN SECOND TRIMESTER, UNSPECIFIED GRAVIDITY (HCC): Primary | ICD-10-CM

## 2025-04-25 LAB
APPEARANCE: CLEAR
BILIRUBIN: NEGATIVE
GLUCOSE (URINE DIPSTICK): NEGATIVE MG/DL
KETONES (URINE DIPSTICK): NEGATIVE MG/DL
LEUKOCYTES: NEGATIVE
MULTISTIX LOT#: NORMAL NUMERIC
NITRITE, URINE: NEGATIVE
OCCULT BLOOD: NEGATIVE
PH, URINE: 7.5 (ref 4.5–8)
PROTEIN (URINE DIPSTICK): NEGATIVE MG/DL
SPECIFIC GRAVITY: 1.01 (ref 1–1.03)
URINE-COLOR: YELLOW
UROBILINOGEN,SEMI-QN: 0.2 MG/DL (ref 0–1.9)

## 2025-04-25 PROCEDURE — 81002 URINALYSIS NONAUTO W/O SCOPE: CPT | Performed by: OBSTETRICS & GYNECOLOGY

## 2025-04-25 PROCEDURE — 3078F DIAST BP <80 MM HG: CPT | Performed by: OBSTETRICS & GYNECOLOGY

## 2025-04-25 PROCEDURE — 3074F SYST BP LT 130 MM HG: CPT | Performed by: OBSTETRICS & GYNECOLOGY

## 2025-04-28 NOTE — PROGRESS NOTES
Low risk male. Sebastian at visit. Current insulin: 10-14-18-56N. Reviewed normal cervical length.

## 2025-04-30 ENCOUNTER — PATIENT MESSAGE (OUTPATIENT)
Dept: OBGYN CLINIC | Facility: CLINIC | Age: 35
End: 2025-04-30

## 2025-04-30 ENCOUNTER — PATIENT MESSAGE (OUTPATIENT)
Dept: ENDOCRINOLOGY CLINIC | Facility: CLINIC | Age: 35
End: 2025-04-30

## 2025-04-30 DIAGNOSIS — O24.112 PREGNANCY COMPLICATED BY PRE-EXISTING TYPE 2 DIABETES IN SECOND TRIMESTER (HCC): ICD-10-CM

## 2025-04-30 NOTE — TELEPHONE ENCOUNTER
Patient sent Blood glucose for review. Per [employer] message from 4/23/25:    Increase the NPH to 56 units daily.   Continue Breakfast- 10 units, Lunch- 14 units, increase dinner to 18 units.   Will look for the numbers again in 1 week.

## 2025-04-30 NOTE — PROGRESS NOTES
Outpatient Maternal-Fetal Medicine Follow-Up     Dear Dr. Chow     Thank you for requesting ultrasound evaluation and maternal fetal medicine consultation on your patient Colette Mantilla.  As you are aware she is a 34 year old female  with a castaneda pregnancy and an Estimated Date of Delivery: 25.  She returned to maternal-fetal medicine today for a follow-up visit.  Her history was reviewed from her prior visit and there were no interval changes.     Antepartum Risk Factors  AMA declines aneuploidy screening and invasive testing.  Pregestational diabetes type 2 --managed by endocrine  IVF pregnancy--PGTA low risk  Morbid obesity  Prior  x 1     PHYSICAL EXAMINATION:  /60   Pulse 88   Wt 239 lb (108.4 kg)   LMP 2024 (Approximate)   BMI 43.71 kg/m²   General: alert and oriented, no acute distress  Abdomen: gravid, soft, non-tender  Extremities: non-tender, no edema     OBSTETRIC ULTRASOUND     Ultrasound Findings:  Single IUP in breech presentation.    Placenta is posterior.   A 3 vessel cord is noted.  Cardiac activity is present at 156 bpm   g ( 0 lb 13 oz);   MVP is 5.7 cm .     A marginal cord insertion was noted.    The fetal measurements are consistent with the established EDC. No ultrasound evidence of structural abnormalities are seen today. The nasal bone is present. No ultrasound evidence of markers for aneuploidy are seen. She understands that ultrasound exam cannot exclude genetic abnormalities and that genetic testing is recommended. The limitations of ultrasound were discussed.     Uterus and adnexa appeared normal today on US    The cervix was not able to be clearly visualized and appeared short by transabdominal ultrasound, therefore transvaginal ultrasound was performed. Cervix is closed, long and no funneling seen measuring 44.2 mm     See PACS/Imaging Tab For Complete Ultrasound Report  I interpreted the results and reviewed them with the patient.      DISCUSSION  During her visit we discussed and reviewed the following issues:    PRIOR  BIRTH  History  History  The patient had prior  PROM in a twin gestation.  Given pre-viable PROM EAB was elected.     Discussion:  See prior [facility] notes for a detailed review.     Limitations of ultrasound reviewed.     IVF GESTATION  See prior [facility] notes for a detailed review.     PRE-GESTATIONAL DIABETES  Managed by endocrinology  See prior [facility] notes for a detailed review.     ADVANCED MATERNAL AGE  See prior [facility] notes for a detailed review.  She did not desire invasive genetic testing.   She also declined screening and diagnostic testing for fetal aneuploidy.  She feels that she will not alter the management of her pregnancy even in the presence of a known  fetal aneuploidy.     IMPRESSION:  IUP at 20w1d  Normal cervical length  AMA declines aneuploidy screening and invasive testing.  Pregestational diabetes type 2 --managed by endocrine  IVF pregnancy--PGTA low risk  Obesity  Prior  x 1  Prior 20 week PROM and pre-viable delivery of twins  MArginal Cord Insertion     RECOMMENDATIONS:  Continue care with Dr. Chow  Cervical length and limited ultrasound at 22 weeks  Four times daily capillary blood glucose assessments (fasting and 2 hour postprandial)  Weekly endocrine review of capillary blood glucose values  Fetal echocardiogram at 24 weeks  Follow-up growth ultrasound every 4 weeks in the third trimester with BPP at or beyond 32 weeks  Weekly NSTs at 32 weeks; twice weekly NST's at 34 weeks  Delivery at 38-39  weeks advised for medication controlled diabetes  Ophthalmology exam if not current  Insulin per endocrinology  Aspirin 162 mg daily     Thank you for allowing me to participate in the care of your patient.  Please do not hesitate to contact me if additional questions or concerns arise.       Vincent Chino M.D.  Maternal-Fetal Medicine       40 minutes spent in review of records, patient  consultation, documentation and coordination of care.  The relevant clinical matter(s) are summarized above.      Note to patient and family  The 21st Century Cures Act makes medical notes available to patients in the interest of transparency.  However, please be advised that this is a medical document.  It is intended as qnjx-ex-cqvb communication.  It is written and medical language may contain abbreviations or verbiage that are technical and unfamiliar.  It may appear blunt or direct.  Medical documents are intended to carry relevant information, facts as evident, and the clinical opinion of the practitioner.

## 2025-05-01 RX ORDER — HUMAN INSULIN 100 [IU]/ML
60 INJECTION, SUSPENSION SUBCUTANEOUS DAILY
Qty: 54 ML | Refills: 1 | Status: SHIPPED | OUTPATIENT
Start: 2025-05-01

## 2025-05-01 NOTE — TELEPHONE ENCOUNTER
Endocrine refill protocol for rapid acting, regular, intermediate, and mixed insulin:    Protocol Criteria:  PASSED Reason: N/A    If all below requirements are met, send a 90-day supply with 1 refill per provider protocol.    Verify appointment with Endocrinology completed in the last 6 months or scheduled in the next 3 months.  Verify A1C has been completed within the last 6 months and is below 8.5%    -May substitute prescriptions for Novolog and Humalog unless documented allergy (pens and vials) at the same dose and concentration per insurance preference and provider protocol.   -May substitute prescriptions for Novolin R and Humulin R unless documented allergy (pens and vials) at the same dose and concentration per insurance preference and provider protocol.   -May substitute prescriptions for Novolin N and Humulin N unless documented allergy (pens and vials) at the same dose and concentration per insurance preference and provider protocol.   -May substitute prescriptions for Humulin and Novolin 70/30 insulin unless documented allergy at the same dose and concentration per insurance preference and provider protocol.    Last completed office visit: 3/20/2025 Colette Jaeger APRN   Next scheduled Follow up:   Future Appointments   Date Time Provider Department Center   5/9/2025 10:00 AM [facility] MFM RM1 [facility] FETAL EM [place]   5/14/2025  1:00 PM Colette Jaeger APRN [facility]   5/23/2025  9:10 AM Sushila Garcia MD [facility]   5/23/2025 11:30 AM [facility] MFM RM1 [facility] FETAL EM [place]   6/11/2025 10:00 AM [facility] MFM RM1 [facility] FETAL EM [place]   6/20/2025 11:20 AM Dorothy Chow MD [facility]      Last A1C result: 5.5% done 3/20/2025.

## 2025-05-07 ENCOUNTER — PATIENT MESSAGE (OUTPATIENT)
Dept: OBGYN CLINIC | Facility: CLINIC | Age: 35
End: 2025-05-07

## 2025-05-09 ENCOUNTER — HOSPITAL ENCOUNTER (OUTPATIENT)
Dept: PERINATAL CARE | Facility: HOSPITAL | Age: 35
Discharge: HOME | End: 2025-05-09
Attending: OBSTETRICS & GYNECOLOGY
Payer: COMMERCIAL

## 2025-05-09 VITALS
WEIGHT: 239 LBS | BODY MASS INDEX: 44 KG/M2 | SYSTOLIC BLOOD PRESSURE: 130 MMHG | HEART RATE: 88 BPM | DIASTOLIC BLOOD PRESSURE: 60 MMHG

## 2025-05-09 DIAGNOSIS — O99.212 OTHER OBESITY AFFECTING PREGNANCY IN SECOND TRIMESTER (HCC): ICD-10-CM

## 2025-05-09 DIAGNOSIS — E66.89 OTHER OBESITY AFFECTING PREGNANCY IN SECOND TRIMESTER (HCC): ICD-10-CM

## 2025-05-09 DIAGNOSIS — E11.9 TYPE 2 DIABETES MELLITUS WITHOUT COMPLICATION, WITH LONG-TERM CURRENT USE OF INSULIN (HCC): ICD-10-CM

## 2025-05-09 DIAGNOSIS — Z87.59 HISTORY OF PRETERM PREMATURE RUPTURE OF MEMBRANES (PPROM): ICD-10-CM

## 2025-05-09 DIAGNOSIS — E11.9 TYPE 2 DIABETES MELLITUS WITHOUT COMPLICATION, WITH LONG-TERM CURRENT USE OF INSULIN (HCC): Primary | ICD-10-CM

## 2025-05-09 DIAGNOSIS — O24.319 PREGESTATIONAL DIABETES MELLITUS, MODIFIED WHITE CLASS B (HCC): ICD-10-CM

## 2025-05-09 DIAGNOSIS — O09.812 PREGNANCY RESULTING FROM ASSISTED REPRODUCTIVE TECHNOLOGY IN SECOND TRIMESTER (HCC): ICD-10-CM

## 2025-05-09 DIAGNOSIS — Z78.9 CONCEIVED BY IN VITRO FERTILIZATION: ICD-10-CM

## 2025-05-09 DIAGNOSIS — Z79.4 TYPE 2 DIABETES MELLITUS WITHOUT COMPLICATION, WITH LONG-TERM CURRENT USE OF INSULIN (HCC): ICD-10-CM

## 2025-05-09 DIAGNOSIS — O99.212 OBESITY AFFECTING PREGNANCY IN SECOND TRIMESTER, UNSPECIFIED OBESITY TYPE (HCC): ICD-10-CM

## 2025-05-09 DIAGNOSIS — Z79.4 TYPE 2 DIABETES MELLITUS WITHOUT COMPLICATION, WITH LONG-TERM CURRENT USE OF INSULIN (HCC): Primary | ICD-10-CM

## 2025-05-09 PROBLEM — O99.210 OBESITY COMPLICATING PREGNANCY (HCC): Status: ACTIVE | Noted: 2025-05-09

## 2025-05-09 PROCEDURE — 76817 TRANSVAGINAL US OBSTETRIC: CPT

## 2025-05-09 PROCEDURE — 76811 OB US DETAILED SNGL FETUS: CPT | Performed by: OBSTETRICS & GYNECOLOGY

## 2025-05-09 NOTE — PROGRESS NOTES
Outpatient Maternal-Fetal Medicine Follow-Up    Dear Dr. Chow    Thank you for requesting ultrasound evaluation and maternal fetal medicine consultation on your patient Colette Mantilla.  As you are aware she is a 34 year old female  with a castaneda pregnancy and an Estimated Date of Delivery: 25.  She returned to maternal-fetal medicine today for a follow-up visit.  Her history was reviewed from her prior visit and there were no interval changes.    Antepartum Risk Factors  AMA declines aneuploidy screening and invasive testing.  Pregestational diabetes type 2 --managed by endocrinology  IVF pregnancy--PGTA low risk  Morbid obesity  Prior  x 1  Prior 20 week PROM and pre-viable delivery of twins    PHYSICAL EXAMINATION:  /70   Pulse 89   Wt 240 lb (108.9 kg)   LMP 2024 (Approximate)   BMI 43.90 kg/m²   General: alert and oriented, no acute distress  Abdomen: gravid, soft, non-tender  Extremities: non-tender, no edema    OBSTETRIC ULTRASOUND    Ultrasound Findings:  Single IUP in breech presentation.    Placenta is posterior.   A 3 vessel cord is noted. Marginal Cord insertion  Cardiac activity is present at 148 bpm  MVP is 5.8 cm  The cervix was not able to be clearly visualized and appeared short by transabdominal ultrasound, therefore transvaginal ultrasound was performed. Transvaginal US findings: Cervix is closed, long and no funneling seen measuring 48.3 mm     See PACS/Imaging Tab For Complete Ultrasound Report  I interpreted the results and reviewed them with the patient.    DISCUSSION  During her visit we discussed and reviewed the following issues:   PRIOR  BIRTH  History  History  The patient had prior  PROM in a twin gestation.  Given pre-viable PROM EAB was elected.     Discussion:  See prior [facility] notes for a detailed review.     Limitations of ultrasound reviewed.     IVF GESTATION  See prior [facility] notes for a detailed review.     PRE-GESTATIONAL  DIABETES  Managed by endocrinology  See prior [facility] notes for a detailed review.     ADVANCED MATERNAL AGE  See prior [facility] notes for a detailed review.  She did not desire invasive genetic testing.   She also declined screening and diagnostic testing for fetal aneuploidy.  She feels that she will not alter the management of her pregnancy even in the presence of a known  fetal aneuploidy.    IMPRESSION:  IUP at 22w1d  Normal cervical length  AMA declines aneuploidy screening and invasive testing.  Pregestational diabetes type 2 --managed by endocrinology  IVF pregnancy--PGTA low risk  Morbid obesity  Prior  x 1  Prior 20 week PROM and pre-viable delivery of twins     RECOMMENDATIONS:  Continue care with Dr. Chow  Four times daily capillary blood glucose assessments (fasting and 2 hour postprandial)  Weekly endocrine review of capillary blood glucose values  Fetal echocardiogram at 24 weeks  Follow-up growth ultrasound every 4 weeks in the third trimester with BPP at or beyond 32 weeks  Weekly NSTs at 32 weeks; twice weekly NST's at 34 weeks  Delivery at 38-39  weeks advised for medication controlled diabetes  Ophthalmology exam if not current  Insulin per endocrinology  Aspirin 162 mg daily    Thank you for allowing me to participate in the care of your patient.  Please do not hesitate to contact me if additional questions or concerns arise.      Vincent Chino M.D.    20 minutes spent in review of records, patient consultation, documentation and coordination of care.  The relevant clinical matter(s) are summarized above.     Note to patient and family  The 21st Century Cures Act makes medical notes available to patients in the interest of transparency.  However, please be advised that this is a medical document.  It is intended as tueg-fu-nvph communication.  It is written and medical language may contain abbreviations or verbiage that are technical and unfamiliar.  It may appear blunt or direct.  Medical  documents are intended to carry relevant information, facts as evident, and the clinical opinion of the practitioner.

## 2025-05-14 ENCOUNTER — TELEPHONE (OUTPATIENT)
Dept: INTERNAL MEDICINE CLINIC | Facility: CLINIC | Age: 35
End: 2025-05-14

## 2025-05-14 ENCOUNTER — OFFICE VISIT (OUTPATIENT)
Dept: ENDOCRINOLOGY CLINIC | Facility: CLINIC | Age: 35
End: 2025-05-14
Payer: COMMERCIAL

## 2025-05-14 ENCOUNTER — PATIENT MESSAGE (OUTPATIENT)
Dept: OBGYN CLINIC | Facility: CLINIC | Age: 35
End: 2025-05-14

## 2025-05-14 ENCOUNTER — PATIENT MESSAGE (OUTPATIENT)
Dept: ENDOCRINOLOGY CLINIC | Facility: CLINIC | Age: 35
End: 2025-05-14

## 2025-05-14 VITALS
HEART RATE: 94 BPM | DIASTOLIC BLOOD PRESSURE: 68 MMHG | BODY MASS INDEX: 44.16 KG/M2 | HEIGHT: 62 IN | WEIGHT: 240 LBS | SYSTOLIC BLOOD PRESSURE: 117 MMHG

## 2025-05-14 DIAGNOSIS — O24.112 PREGNANCY COMPLICATED BY PRE-EXISTING TYPE 2 DIABETES IN SECOND TRIMESTER (HCC): Primary | ICD-10-CM

## 2025-05-14 DIAGNOSIS — Z79.4 TYPE 2 DIABETES MELLITUS WITHOUT COMPLICATION, WITH LONG-TERM CURRENT USE OF INSULIN (HCC): Primary | ICD-10-CM

## 2025-05-14 DIAGNOSIS — E11.9 TYPE 2 DIABETES MELLITUS WITHOUT COMPLICATION, WITH LONG-TERM CURRENT USE OF INSULIN (HCC): Primary | ICD-10-CM

## 2025-05-14 LAB
GLUCOSE BLOOD: 114
HEMOGLOBIN A1C: 5.3 % (ref 4.3–5.6)
TEST STRIP LOT #: NORMAL NUMERIC

## 2025-05-14 PROCEDURE — 3008F BODY MASS INDEX DOCD: CPT

## 2025-05-14 PROCEDURE — 83036 HEMOGLOBIN GLYCOSYLATED A1C: CPT

## 2025-05-14 PROCEDURE — 99213 OFFICE O/P EST LOW 20 MIN: CPT

## 2025-05-14 PROCEDURE — 3078F DIAST BP <80 MM HG: CPT

## 2025-05-14 PROCEDURE — 3044F HG A1C LEVEL LT 7.0%: CPT

## 2025-05-14 PROCEDURE — 3074F SYST BP LT 130 MM HG: CPT

## 2025-05-14 PROCEDURE — 82947 ASSAY GLUCOSE BLOOD QUANT: CPT

## 2025-05-14 NOTE — PROGRESS NOTES
Name: Colette Mantilla Date: 5/14/25  Referring Physician: No ref. provider found  Dr. Emeli Sagastume - fertility specialist    CHIEF COMPLAINT   Follow up for DM      HISTORY OF PRESENT ILLNESS   Colette Mantilla  is a 31 year old female who presents for follow up on diabetes management.  5/2022 was seen during twin pregnancy which unfortunately did end in loss due to PPROM at 20 weeks.   Fertility treatments had been on hold for 6 months over the last year. She was transitioned off insulin and we started Mounjaro to help with glycemic control and weight loss in hopes of resuming fertility treatments. Was able to lose almost 50lbs with Mounjaro prior to resuming fertility treatments. Now off Mounjaro and has had a successful transfer with frozen embryo.  She is now 20 weeks pregnant with second child following successful IVF frozen embryo transfer.   FAMILY HISTORY OF DIABETES  -father- DM type 2  -Maternal grandmother- DM type 2  -Paternal grandmother- DM type 2    DIABETES HISTORY  Diagnosed: GDM with prior pregnancy in 2017, progressed to type 2 diabetes controlled with diet.   Prior HbA, C or glycohemoglobin were 6/2018-6.0%; 10/2020- 7.8%; 1/2021- 6.7%; 3/2022- 7.6%; 6.2% 5/2022; 7.2% 1/2023; 5.6% 5/2023; 6.0% 8/2023; 5.6% 1/2024; 5.8% 6/2024; 6.2% 1/2025; 6.0% 2/2025; 5.5% 3/2024; 5.3% 5/2025  Previous DM medications:   Mounjaro 12.5mg subcutaneous weekly- now stopped since resuming fertility treatment and now subsequent pregnancy.   CURRENT MEDICATIONS FOR DM:  NPH-  60 units subcutaneous daily at bedtime.   Novolog- 10-14-18 TID with meals   HOME GLUCOSE READINGS:   Hypoglycemia: Infrequent since last visit- occ. Fasting   Meter or log book today: Checking 3-4 times daily- fasting and post prandial readings  Reviewed logs:  Fasting- 81, 89, 89, 80, 92, 95,   2hrs post breakfast- 88, 102, 81, 95, 103, 110  2hrs post lunch-   2hrs post dinner- 104, 125, 102, 112, 125    HISTORY OF DIABETES  COMPLICATIONS:  History of Retinopathy: No - last eye exam within the last 12 months: Dr. Blackburn 4/2025  History of Neuropathy: No, denies symptoms  History of Nephropathy: No  ASSOCIATED COMPLICATIONS:   HTN: No   Hyperlipidemia: No   Coronary Artery Disease: No  Cerebrovascular Disease: No  Peripheral Vascular Disease: No    DIETARY COMPLIANCE:  Very Good- still watching carbohydrates and incorporating protein in meals   Breakfast- 2 hard boiled eggs with sausage, low carb egg wrap and ezekial bread, or sometimes skipping   Lunch- turkey Hummus wrap with berries   Dinner- side salad with chicken or chickpea pasta   Beverages- water, tea   Snacks- wheat crackers with cheese, yogurt  EXERCISE:   Has started walking - typically at least once daily     ROS:  Polyuria, polyphagia, polydipsia: no  Paresthesias: no, denies symptoms   Blurred vision: no  Recent steroids, illness or infections: No   Osteoporosis- No  Thyroid disease- No  MEDICATIONS:   Current Outpatient Medications on File Prior to Visit   Medication Sig Dispense Refill    Insulin NPH, Human,, Isophane, (NOVOLIN N FLEXPEN) 100 UNIT/ML Subcutaneous Suspension Pen-injector Inject 60 Units into the skin daily. 54 mL 1    insulin aspart (NOVOLOG FLEXPEN) 100 Units/mL Subcutaneous Solution Pen-injector 10 units with breakfast, 14 units with lunch, 16 units with dinner. 36 mL 1    dexamethasone 0.5 MG Oral Tab Take 0.5 tablets (0.25 mg total) by mouth daily with breakfast.      BABY ASPIRIN OR Take 1 tablet by mouth daily.      insulin aspart 100 UNIT/ML Subcutaneous Solution Cartridge Inject 12 Units into the skin once. 12 units with breakfast, 10 units with lunch, 12 units with dinner      CONTOUR NEXT TEST In Vitro Strip USE TO TEST BLOOD SUGARS 4 TIMES DAILY 400 strip 0    Insulin Pen Needle (BD PEN NEEDLE PARAMJIT 2ND GEN) 32G X 4 MM Does not apply Misc Use to inject insulin three times a day 300 each 0    folic acid 1 MG Oral Tab Take by mouth daily.       acidophilus-pectin Oral Cap Take 1 capsule by mouth daily. FERTILITY PROBIOTIC (Patient not taking: Reported on 4/25/2025)      Microlet Lancets Does not apply Misc Use to test blood sugars 4 times daily 400 each 0    Glucose Blood (ONETOUCH VERIO) In Vitro Strip TEST 4 TIMES A  strip 3    Insulin Pen Needle (BD PEN NEEDLE ORIGINAL U/F) 29G X 12.7MM Does not apply Misc Use to inject insulin 4 times daily 400 each 0    OneTouch Delica Lancets 33G Does not apply Misc 4 x daily 200 each 3    Blood Glucose Monitoring Suppl (ONETOUCH VERIO FLEX SYSTEM) w/Device Does not apply Kit 1 kit in the morning, at noon, in the evening, and at bedtime. May substitute per insurance formulary 1 kit 3    prenatal vitamin w/DHA 27-0.8-228 MG Oral Cap Take 1 capsule by mouth daily.      Vitamin D3, Cholecalciferol, 50 MCG (2000 UT) Oral Tab Take 1 tablet (2,000 Units total) by mouth daily.       No current facility-administered medications on file prior to visit.     ALLERGIES:   No Known Allergies    SOCIAL HISTORY:   Social History     Socioeconomic History    Marital status:      Spouse name: Not on file    Number of children: Not on file    Years of education: Not on file    Highest education level: Not on file   Occupational History    Not on file   Tobacco Use    Smoking status: Never    Smokeless tobacco: Never   Substance and Sexual Activity    Alcohol use: Never    Drug use: No    Sexual activity: Yes     Partners: Male     Birth control/protection: OCP   Other Topics Concern    Not on file   Social History Narrative    Not on file     Social Drivers of Health     Food Insecurity: No Food Insecurity (2/20/2025)    NCSS - Food Insecurity     Worried About Running Out of Food in the Last Year: No     Ran Out of Food in the Last Year: No   Transportation Needs: No Transportation Needs (2/20/2025)    NCSS - Transportation     Lack of Transportation: No   Stress: No Stress Concern Present (2/20/2025)    Stress      Feeling of Stress : No   Housing Stability: Not At Risk (2025)    NCSS - Housing/Utilities     Has Housing: Yes     Worried About Losing Housing: No     Unable to Get Utilities: No     PAST MEDICAL HISTORY:   Past Medical History:    Diabetes (HCC)    History of varicella as a child    Infertility, female     PAST SURGICAL HISTORY:   Past Surgical History:   Procedure Laterality Date      2017    Cholecystectomy      Lasik Bilateral ~    Dr. Nathan Garcia        PHYSICAL EXAM:   Vitals:    25 1255   BP: 117/68   Pulse: 94       General Appearance:  alert, well developed, in no acute distress  Eyes:  pupils are equal, round and reactive. Normal conjunctivae and normal sclera   Neck: Trachea midline: Normal.   Back: no kyphosis or back tenderness  Lymph Nodes:  No abnormal nodes noted  Musculoskeletal:  normal muscle strength and tone  Skin:  normal moisture and skin texture  Hair & Nails:  normal scalp hair     Hematologic:  no excessive bruising  Neuro:  sensory grossly intact and motor grossly intact  Psychiatric:  oriented to time, self, and place  Nutritional:  no abnormal weight gain or loss    LABS: Pertinent labs reviewed    ASSESSMENT/PLAN:    Diabetes mellitus type 2 controlled  -HgA1c- 5.3% - at goal   -Congratulated patient on continued improved glycemic control and on positive pregnancy test  -Reviewed with patient the pathogenesis of diabetes, clinical significance of A1c, and common complications such as: microvascular, macrovascular and diabetic ketoacidosis. Patient verbalizes understanding of the importance of glycemic control and the goals of therapy.   -Discussed with patient glucose targets ranges for preconception and pregnancy (Fasting <90 and 2hr post prandial <120).   -reviewed limiting carbs to 165gm of carbs/day.   -discussed importance of incorporating low carb snacks during the day and at bedtime  -reviewed blood sugar target goals: fasting <90 and 2hrs post prandial  <120  -reviewed necessity to send us her BG readings every 4-7 days for review during pregnancy  -Discussed dangers of uncontrolled diabetes in pregnancy and possible fetal complications.   -Discussed importance of glycemic control for maternal and fetal health  -Discussed insulin injection site rotation.   - Continue NPH 60 units subcutaneous daily.   - Continue Novolog 10-14-18 TID with meals. Reviewed insulin timing and administration.  - We had used 24 hour basal insulin previously but she felt fasting sugars better controlled with NPH  -Continue to check sugars 4 times daily- fasting and post prandial    -She will send glucose readings via [employer] in 1 week.  -normotensive   -lipids 6/2024- LDL slightly elevated at 108. Will address after pregnancy  -no nephropathy 6/2024  -Recheck labs postpartum  -Reviewed importance of yearly optho follow up - UTD with optho  -normal foot exam   - Reviewed monthly endocrine follow up during pregnancy.  -She is not planning to breastfeed at this time- would like to consider restarting Mounjaro after delivery.   RTC in 4-6 weeks  EUSEBIO Borden  5/14/25  A total of  25 minutes was spent on obtaining history, reviewing pertinent imaging/labs and specialists notes, evaluating patient, providing multiple treatment options, reinforcing diet/exercise and compliance, and completing documentation.

## 2025-05-21 ENCOUNTER — PATIENT MESSAGE (OUTPATIENT)
Dept: OBGYN CLINIC | Facility: CLINIC | Age: 35
End: 2025-05-21

## 2025-05-23 ENCOUNTER — PATIENT MESSAGE (OUTPATIENT)
Dept: OBGYN CLINIC | Facility: CLINIC | Age: 35
End: 2025-05-23

## 2025-05-23 ENCOUNTER — PATIENT MESSAGE (OUTPATIENT)
Dept: ENDOCRINOLOGY CLINIC | Facility: CLINIC | Age: 35
End: 2025-05-23

## 2025-05-23 ENCOUNTER — HOSPITAL ENCOUNTER (OUTPATIENT)
Dept: PERINATAL CARE | Facility: HOSPITAL | Age: 35
Discharge: HOME | End: 2025-05-23
Attending: OBSTETRICS & GYNECOLOGY
Payer: COMMERCIAL

## 2025-05-23 ENCOUNTER — ROUTINE PRENATAL (OUTPATIENT)
Dept: OBGYN CLINIC | Facility: CLINIC | Age: 35
End: 2025-05-23
Payer: COMMERCIAL

## 2025-05-23 VITALS
BODY MASS INDEX: 44 KG/M2 | SYSTOLIC BLOOD PRESSURE: 116 MMHG | HEART RATE: 96 BPM | WEIGHT: 240 LBS | DIASTOLIC BLOOD PRESSURE: 72 MMHG

## 2025-05-23 VITALS
HEART RATE: 89 BPM | SYSTOLIC BLOOD PRESSURE: 114 MMHG | BODY MASS INDEX: 44 KG/M2 | WEIGHT: 240 LBS | DIASTOLIC BLOOD PRESSURE: 70 MMHG

## 2025-05-23 DIAGNOSIS — E11.9 TYPE 2 DIABETES MELLITUS WITHOUT COMPLICATION, WITH LONG-TERM CURRENT USE OF INSULIN (HCC): ICD-10-CM

## 2025-05-23 DIAGNOSIS — Z87.59 HISTORY OF PRETERM PREMATURE RUPTURE OF MEMBRANES (PPROM): ICD-10-CM

## 2025-05-23 DIAGNOSIS — Z87.59 HISTORY OF PRETERM PREMATURE RUPTURE OF MEMBRANES (PPROM): Primary | ICD-10-CM

## 2025-05-23 DIAGNOSIS — O99.212 OBESITY AFFECTING PREGNANCY IN SECOND TRIMESTER, UNSPECIFIED OBESITY TYPE (HCC): ICD-10-CM

## 2025-05-23 DIAGNOSIS — O24.319 PREGESTATIONAL DIABETES MELLITUS, MODIFIED WHITE CLASS B (HCC): ICD-10-CM

## 2025-05-23 DIAGNOSIS — Z79.4 TYPE 2 DIABETES MELLITUS WITHOUT COMPLICATION, WITH LONG-TERM CURRENT USE OF INSULIN (HCC): ICD-10-CM

## 2025-05-23 DIAGNOSIS — Z34.92 ENCOUNTER FOR SUPERVISION OF NORMAL PREGNANCY IN SECOND TRIMESTER, UNSPECIFIED GRAVIDITY (HCC): Primary | ICD-10-CM

## 2025-05-23 LAB
GLUCOSE (URINE DIPSTICK): NEGATIVE MG/DL
KETONES (URINE DIPSTICK): NEGATIVE MG/DL
LEUKOCYTES: NEGATIVE
MULTISTIX LOT#: ABNORMAL NUMERIC
NITRITE, URINE: NEGATIVE
OCCULT BLOOD: NEGATIVE
PH, URINE: 6 (ref 4.5–8)
PROTEIN (URINE DIPSTICK): NEGATIVE MG/DL
SPECIFIC GRAVITY: 1.01 (ref 1–1.03)
UROBILINOGEN,SEMI-QN: 0.2 MG/DL (ref 0–1.9)

## 2025-05-23 PROCEDURE — 3074F SYST BP LT 130 MM HG: CPT | Performed by: OBSTETRICS & GYNECOLOGY

## 2025-05-23 PROCEDURE — 76815 OB US LIMITED FETUS(S): CPT

## 2025-05-23 PROCEDURE — 3078F DIAST BP <80 MM HG: CPT | Performed by: OBSTETRICS & GYNECOLOGY

## 2025-05-23 PROCEDURE — 81002 URINALYSIS NONAUTO W/O SCOPE: CPT | Performed by: OBSTETRICS & GYNECOLOGY

## 2025-05-23 PROCEDURE — 76817 TRANSVAGINAL US OBSTETRIC: CPT | Performed by: OBSTETRICS & GYNECOLOGY

## 2025-05-23 NOTE — PROGRESS NOTES
The following individual(s) verbally consented to be recorded using ambient AI listening technology and understand that they can each withdraw their consent to this listening technology at any point by asking the clinician to turn off or pause the recording:    Patient name: Colette Mantilla

## 2025-05-27 NOTE — TELEPHONE ENCOUNTER
ASSESSMENT/PLAN:    Diabetes mellitus type 2 controlled  -HgA1c- 5.3% - at goal   -Congratulated patient on continued improved glycemic control and on positive pregnancy test  -Reviewed with patient the pathogenesis of diabetes, clinical significance of A1c, and common complications such as: microvascular, macrovascular and diabetic ketoacidosis. Patient verbalizes understanding of the importance of glycemic control and the goals of therapy.   -Discussed with patient glucose targets ranges for preconception and pregnancy (Fasting <90 and 2hr post prandial <120).   -reviewed limiting carbs to 165gm of carbs/day.   -discussed importance of incorporating low carb snacks during the day and at bedtime  -reviewed blood sugar target goals: fasting <90 and 2hrs post prandial <120  -reviewed necessity to send us her BG readings every 4-7 days for review during pregnancy  -Discussed dangers of uncontrolled diabetes in pregnancy and possible fetal complications.   -Discussed importance of glycemic control for maternal and fetal health  -Discussed insulin injection site rotation.   - Continue NPH 60 units subcutaneous daily.   - Continue Novolog 10-14-18 TID with meals. Reviewed insulin timing and administration.  - We had used 24 hour basal insulin previously but she felt fasting sugars better controlled with NPH  -Continue to check sugars 4 times daily- fasting and post prandial    -She will send glucose readings via [employer] in 1 week.  -normotensive   -lipids 6/2024- LDL slightly elevated at 108. Will address after pregnancy  -no nephropathy 6/2024  -Recheck labs postpartum  -Reviewed importance of yearly optho follow up - UTD with optho  -normal foot exam   - Reviewed monthly endocrine follow up during pregnancy.  -She is not planning to breastfeed at this time- would like to consider restarting Mounjaro after delivery.   RTC in 4-6 weeks  Colette Jaeger, EUSEBIO  5/14/25

## 2025-05-27 NOTE — PROGRESS NOTES
History of Present Illness  Colette Mantilla is a 34 year old female with pregestational diabetes who presents for a routine prenatal visit at 22 weeks and 1 day of gestation.    She is currently 22 weeks and 1 day pregnant with her second child. Her pregestational diabetes is managed by an endocrinologist, with her hemoglobin A1c improving from 6.0% in February to 5.3% on May 14th. Her insulin regimen includes doses of 10, 14, 18, and 60 units at nighttime. Despite this, 50% of her glucose values are abnormal, with three out of six values being abnormal for both dinner and fasting -- needs to increase dinner to 20 units & bedtime to 62NPH.    She experienced a pregnancy loss at 20 weeks in a previous pregnancy due to cervical insufficiency and is under close monitoring by maternal-fetal medicine specialists. Recent evaluations show her cervix is stable with a length of 44.2 mm, and a level two ultrasound on May 9th was normal. She has multiple upcoming [facility] appointments.    She has completed her EKG and eye exam and is taking two baby aspirins daily. She feels fetal movements and has no contractions, leaking of water, or bleeding. Her first child was delivered full term, and her second pregnancy was with twins.    Physical Exam    CARDIOVASCULAR: Fetal heart rate 165 bpm.    Results  LABS  HbA1c: 5.3% (05/14/2025)    RADIOLOGY  Cervical length ultrasound: 44.2 mm  Level 2 ultrasound: Normal (05/09/2025)    DIAGNOSTIC REPORTS  EKG: Normal    Assessment & Plan  Pregestational diabetes in pregnancy  At 22 weeks and 1 day of gestation with her second child, she has pregestational diabetes. Her hemoglobin A1c improved from 6.0 in February to 5.3 on May 14th. However, 50% of her glucose values are abnormal, with three out of six values being abnormal for both dinner and fasting. Stricter glucose control is necessary during pregnancy, with fasting glucose needing to be below 95 mg/dL and postprandial glucose below 120 mg/dL.  An increase in insulin is advised due to the abnormal values.  - Increase dinner insulin dose to 20 units.  - Increase bedtime NPH insulin dose to 62 units.  - Inform endocrinologist of the OB's recommendations for insulin adjustments.      Cervical insufficiency  She experienced a pregnancy loss at 20 weeks due to cervical insufficiency. Currently, her cervix measures 44.2 mm, and a level 2 ultrasound on May 9th was normal. She is under close monitoring by [facility] specialists, who are scanning her cervix frequently. The hospital can maintain care for the  from 32 weeks onwards, but if delivery is required before 32 weeks, the  would need to be transported to another facility.  - Continue frequent cervical length monitoring by [facility] specialists.  - Schedule growth ultrasounds every four weeks in the third trimester.  - Initiate non-stress tests at 32 weeks, increasing to twice a week at 34 weeks.  - Plan for delivery between 38 to 39 weeks, with potential adjustments based on clinical indications.     may be comfortable. If you live far from the hospital or birthing center, you may want to think about going somewhere nearby so you can get back to the hospital quickly. For some women, there may be benefits to staying home during early labor, such as avoiding medicines or procedures. As labor progresses, you'll shift from early labor to active labor. During this time, contractions get more intense. They occur more often, about every 2 to 3 minutes. They also last longer, about 50 to 70 seconds. You will feel them even when you change positions and walk or move around. It may be hard to tell if you are in active labor. If you aren't sure, call your doctor or midwife. As your labor progresses, check in with your doctor or midwife about when to come back to the hospital or birthing center. You may have special instructions if your water broke or you tested positive for group B strep. Follow-up care is a key part of your treatment and safety. Be sure to make and go to all appointments, and call your doctor if you are having problems. It's also a good idea to know your test results and keep a list of the medicines you take. How can you care for yourself at home? Get support. Having a support person with you from early labor until after childbirth can have a positive effect on childbirth. Find distractions. During early labor, you can walk, play cards, watch TV, or listen to music to help take your mind off your contractions. Ask your partner, labor , or  for a massage. Shoulder and low back massage during contractions may ease your pain. Strong massage of the back muscles (counterpressure) during contractions may help relieve the pain of back labor. Tell your labor  exactly where to push and how hard to push. Use imagery. This means using your imagination to decrease your pain. For instance, to help manage pain, picture your contractions as waves rolling over you.  Picture a peaceful place, such

## 2025-05-28 ENCOUNTER — PATIENT MESSAGE (OUTPATIENT)
Dept: OBGYN CLINIC | Facility: CLINIC | Age: 35
End: 2025-05-28

## 2025-05-30 ENCOUNTER — PATIENT MESSAGE (OUTPATIENT)
Dept: ENDOCRINOLOGY CLINIC | Facility: CLINIC | Age: 35
End: 2025-05-30

## 2025-06-04 ENCOUNTER — PATIENT MESSAGE (OUTPATIENT)
Dept: OBGYN CLINIC | Facility: CLINIC | Age: 35
End: 2025-06-04

## 2025-06-11 ENCOUNTER — HOSPITAL ENCOUNTER (OUTPATIENT)
Dept: PERINATAL CARE | Facility: HOSPITAL | Age: 35
Discharge: HOME | End: 2025-06-11
Attending: OBSTETRICS & GYNECOLOGY
Payer: COMMERCIAL

## 2025-06-11 ENCOUNTER — PATIENT MESSAGE (OUTPATIENT)
Dept: OBGYN CLINIC | Facility: CLINIC | Age: 35
End: 2025-06-11

## 2025-06-11 ENCOUNTER — PATIENT MESSAGE (OUTPATIENT)
Dept: ENDOCRINOLOGY CLINIC | Facility: CLINIC | Age: 35
End: 2025-06-11

## 2025-06-11 VITALS
DIASTOLIC BLOOD PRESSURE: 74 MMHG | BODY MASS INDEX: 45 KG/M2 | WEIGHT: 246 LBS | SYSTOLIC BLOOD PRESSURE: 121 MMHG | HEART RATE: 101 BPM

## 2025-06-11 DIAGNOSIS — O99.212 MATERNAL MORBID OBESITY IN SECOND TRIMESTER, ANTEPARTUM (HCC): ICD-10-CM

## 2025-06-11 DIAGNOSIS — E11.9 TYPE 2 DIABETES MELLITUS WITHOUT COMPLICATION, WITH LONG-TERM CURRENT USE OF INSULIN (HCC): ICD-10-CM

## 2025-06-11 DIAGNOSIS — Z79.4 TYPE 2 DIABETES MELLITUS WITHOUT COMPLICATION, WITH LONG-TERM CURRENT USE OF INSULIN (HCC): ICD-10-CM

## 2025-06-11 DIAGNOSIS — O09.812 PREGNANCY RESULTING FROM IN VITRO FERTILIZATION IN SECOND TRIMESTER (HCC): ICD-10-CM

## 2025-06-11 DIAGNOSIS — Z87.59 HISTORY OF PRETERM PREMATURE RUPTURE OF MEMBRANES (PPROM): ICD-10-CM

## 2025-06-11 DIAGNOSIS — O99.212 OBESITY AFFECTING PREGNANCY IN SECOND TRIMESTER, UNSPECIFIED OBESITY TYPE (HCC): ICD-10-CM

## 2025-06-11 DIAGNOSIS — O09.812 PREGNANCY RESULTING FROM IN VITRO FERTILIZATION IN SECOND TRIMESTER (HCC): Primary | ICD-10-CM

## 2025-06-11 DIAGNOSIS — E66.01 MATERNAL MORBID OBESITY IN SECOND TRIMESTER, ANTEPARTUM (HCC): ICD-10-CM

## 2025-06-11 DIAGNOSIS — O24.112 TYPE 2 DIABETES MELLITUS AFFECTING PREGNANCY IN SECOND TRIMESTER, ANTEPARTUM (HCC): ICD-10-CM

## 2025-06-11 PROCEDURE — 76827 ECHO EXAM OF FETAL HEART: CPT

## 2025-06-11 PROCEDURE — 76825 ECHO EXAM OF FETAL HEART: CPT | Performed by: OBSTETRICS & GYNECOLOGY

## 2025-06-11 PROCEDURE — 93325 DOPPLER ECHO COLOR FLOW MAPG: CPT

## 2025-06-11 NOTE — PROGRESS NOTES
Outpatient Maternal-Fetal Medicine Follow-Up    Dear Dr. Baird    Thank you for requesting ultrasound evaluation and maternal fetal medicine consultation on your patient Colette Mantilla.  As you are aware she is a 34 year old female  with a castaneda pregnancy and an Estimated Date of Delivery: 25.  She returned to maternal-fetal medicine today for a follow-up visit.  Her history was reviewed from her prior visit and there were no interval changes.    Antepartum Risk Factors  AMA declines aneuploidy screening and invasive testing.  Pregestational diabetes type 2 --managed by endocrinology  IVF pregnancy--PGTA low risk  Morbid obesity  Prior  x 1  Prior 20 week PROM and pre-viable delivery of twins    PHYSICAL EXAMINATION:  /74   Pulse 101   Wt 246 lb (111.6 kg)   LMP 2024 (Approximate)   BMI 44.99 kg/m²   General: alert and oriented, no acute distress  Abdomen: gravid, soft, non-tender  Extremities: non-tender, no edema      DISCUSSION  During her visit we discussed and reviewed the following issues:   PRIOR  BIRTH  History  History  The patient had prior  PROM in a twin gestation.  Given pre-viable PROM EAB was elected.     Discussion:  See prior MFM notes for a detailed review.     Limitations of ultrasound reviewed.     IVF GESTATION  See prior MF notes for a detailed review.     PRE-GESTATIONAL DIABETES  Managed by endocrinology  See prior [facility] notes for a detailed review.     ADVANCED MATERNAL AGE  See prior [facility] notes for a detailed review.  She did not desire invasive genetic testing.   She also declined screening and diagnostic testing for fetal aneuploidy.  She feels that she will not alter the management of her pregnancy even in the presence of a known  fetal aneuploidy.      OB ULTRASOUND REPORT   See imaging tab for complete ultrasound report or in PACS    Suboptimal view: limited by maternal body habitus and fetal position  Single IUP in cephalic presentation.     Placenta is posterior.   A 3 vessel cord is noted.  Cardiac activity is present at 154 bpm  MVP is 4.7 cm .       _____________________________________________________________  Echocardiography:  Fetus 1:    Image quality:  Good  Situs:  Normal  Position of stomach:  Left sided  Heart size:  Normal  Position of apex:  normal  Systemic veins:  Normal  Pulmonary veins:  Normal  Atrial septum:  Normal  Flow at foramen ovale:  Normal  Atrioventricular junction:  Concordant, patent AV valves, equal size  Atrioventricular valve regurgit.:  None  Ventricular septum:  Intact  Ventricular function:  Normal  Great artery connections:  Normal  Arterial valve regurgitation:  None  Branch pulmonary arteries,:  Confluent, normal size  Ductal Arch:  Normal  Aortic arch:  Normal  Rhythm:  Sinus        FETAL ECHOCARDIOGRAM:         A transabdominal 2-D, Doppler fetal echocardiogram is performed.  There is a four-chamber heart of appropriate cardiac dimensions.  There is situs solitus with levocardia.  Intracardiac connections appear to be normal.  The atrioventricular valves appear normal.  There is no evidence of pericardial or pleural effusion.  The A-V conduction is one to one and the heart rate is appropriate for gestational age.  No evidence of fetal arrhythmias is seen during today's study.  There is a right to left shunting across the patent foramen ovale.  There is a normal appearance of the aorta and branching pattern of the head vessels.        There appears to be a structurally normal fetal heart and rhythm.  The patient was made aware of the limitations of fetal heart study: malformations such as but not limiting to minor valve, VSD or coarctation of the aorta may be missed. I discussed the results with the patient.         IMPRESSION:  IUP at 24w6d   Normal fetal echocardiogram  AMA declines aneuploidy screening and invasive testing.  Pregestational diabetes type 2 --managed by endocrinology  IVF pregnancy--PGTA low  risk  Morbid obesity  Prior  x 1  Prior 20 week PROM and pre-viable delivery of twins     RECOMMENDATIONS:  Continue care with Dr. Chow  Weekly endocrine review of capillary blood glucose values  Follow-up growth ultrasound every 4 weeks in the third trimester with BPP at or beyond 32 weeks  Weekly NSTs at 32 weeks; twice weekly NST's at 34 weeks  Delivery at 38-39  weeks advised for medication controlled diabetes  Insulin per endocrinology  Aspirin 162 mg daily    Thank you for allowing me to participate in the care of your patient.  Please do not hesitate to contact me if additional questions or concerns arise.      Zak Nayak D.O.  Maternal Fetal Medicine     30 minutes spent in review of records, patient consultation, documentation and coordination of care.  The relevant clinical matter(s) are summarized above.     Note to patient and family  The 21st Century Cures Act makes medical notes available to patients in the interest of transparency.  However, please be advised that this is a medical document.  It is intended as [id number] communication.  It is written and medical language may contain abbreviations or verbiage that are technical and unfamiliar.  It may appear blunt or direct.  Medical documents are intended to carry relevant information, facts as evident, and the clinical opinion of the practitioner.

## 2025-06-11 NOTE — TELEPHONE ENCOUNTER
Carrie -- please advise on behalf of Mai    pt is pregnant and reporting weekly BG for review

## 2025-06-18 ENCOUNTER — PATIENT MESSAGE (OUTPATIENT)
Dept: OBGYN CLINIC | Facility: CLINIC | Age: 35
End: 2025-06-18

## 2025-06-19 ENCOUNTER — HOSPITAL ENCOUNTER (OUTPATIENT)
Dept: PERINATAL CARE | Facility: HOSPITAL | Age: 35
Discharge: HOME | End: 2025-06-19
Attending: OBSTETRICS & GYNECOLOGY
Payer: COMMERCIAL

## 2025-06-19 ENCOUNTER — LAB ENCOUNTER (OUTPATIENT)
Dept: LAB | Facility: HOSPITAL | Age: 35
End: 2025-06-19
Attending: OBSTETRICS & GYNECOLOGY
Payer: COMMERCIAL

## 2025-06-19 VITALS
DIASTOLIC BLOOD PRESSURE: 73 MMHG | HEART RATE: 91 BPM | SYSTOLIC BLOOD PRESSURE: 121 MMHG | BODY MASS INDEX: 45 KG/M2 | WEIGHT: 246 LBS

## 2025-06-19 DIAGNOSIS — O99.213 OBESITY AFFECTING PREGNANCY IN THIRD TRIMESTER, UNSPECIFIED OBESITY TYPE (HCC): ICD-10-CM

## 2025-06-19 DIAGNOSIS — O24.319 PREGESTATIONAL DIABETES MELLITUS, MODIFIED WHITE CLASS B (HCC): ICD-10-CM

## 2025-06-19 DIAGNOSIS — Z79.4 TYPE 2 DIABETES MELLITUS WITHOUT COMPLICATION, WITH LONG-TERM CURRENT USE OF INSULIN (HCC): ICD-10-CM

## 2025-06-19 DIAGNOSIS — Z34.92 ENCOUNTER FOR SUPERVISION OF NORMAL PREGNANCY IN SECOND TRIMESTER, UNSPECIFIED GRAVIDITY (HCC): ICD-10-CM

## 2025-06-19 DIAGNOSIS — Z78.9 CONCEIVED BY IN VITRO FERTILIZATION: ICD-10-CM

## 2025-06-19 DIAGNOSIS — E11.9 TYPE 2 DIABETES MELLITUS WITHOUT COMPLICATION, WITH LONG-TERM CURRENT USE OF INSULIN (HCC): ICD-10-CM

## 2025-06-19 DIAGNOSIS — Z87.59 HISTORY OF PRETERM PREMATURE RUPTURE OF MEMBRANES (PPROM): ICD-10-CM

## 2025-06-19 DIAGNOSIS — Z87.59 HISTORY OF PRETERM PREMATURE RUPTURE OF MEMBRANES (PPROM): Primary | ICD-10-CM

## 2025-06-19 DIAGNOSIS — Z98.891 PREVIOUS CESAREAN SECTION: ICD-10-CM

## 2025-06-19 LAB
DEPRECATED RDW RBC AUTO: 45.2 FL (ref 35.1–46.3)
ERYTHROCYTE [DISTWIDTH] IN BLOOD BY AUTOMATED COUNT: 14 % (ref 11–15)
HCT VFR BLD AUTO: 33.1 % (ref 35–48)
HGB BLD-MCNC: 11 G/DL (ref 12–16)
MCH RBC QN AUTO: 29.3 PG (ref 26–34)
MCHC RBC AUTO-ENTMCNC: 33.2 G/DL (ref 31–37)
MCV RBC AUTO: 88.3 FL (ref 80–100)
PLATELET # BLD AUTO: 179 10(3)UL (ref 150–450)
RBC # BLD AUTO: 3.75 X10(6)UL (ref 3.8–5.3)
WBC # BLD AUTO: 8.7 X10(3) UL (ref 4–11)

## 2025-06-19 PROCEDURE — 36415 COLL VENOUS BLD VENIPUNCTURE: CPT

## 2025-06-19 PROCEDURE — 76816 OB US FOLLOW-UP PER FETUS: CPT | Performed by: OBSTETRICS & GYNECOLOGY

## 2025-06-19 PROCEDURE — 85027 COMPLETE CBC AUTOMATED: CPT

## 2025-06-19 NOTE — PROGRESS NOTES
Outpatient Maternal-Fetal Medicine Follow-Up    Dear Dr. Baird    Thank you for requesting ultrasound evaluation and maternal fetal medicine consultation on your patient Colette Mantilla.  As you are aware she is a 34 year old female  with a castaneda pregnancy and an Estimated Date of Delivery: 25.  She returned to maternal-fetal medicine today for a follow-up visit.  Her history was reviewed from her prior visit and there were no interval changes.    Antepartum Risk Factors  AMA declines aneuploidy screening and invasive testing.  Diabetes type 2 --managed by endocrinology  IVF pregnancy--PGTA low risk  Morbid obesity  Prior  x 1  Prior 20 week PROM and pre-viable delivery of twins    PHYSICAL EXAMINATION:  /73   Pulse 91   Wt 246 lb (111.6 kg)   LMP 2024 (Approximate)   BMI 44.99 kg/m²   General: alert and oriented, no acute distress  Abdomen: gravid, soft, non-tender  Extremities: non-tender, no edema    OBSTETRIC ULTRASOUND    Ultrasound Findings:    Single IUP in cephalic presentation.    Placenta is posterior.   A 3 vessel cord is noted. Normal cord insertion  Cardiac activity is present at 159 bpm   g ( 2 lb 2 oz); 62%.    MVP is 6.8 cm .    The fetal measurements are consistent with established RITO. No gross ultrasound evidence of structural abnormalities are seen today. The patient understands that ultrasound cannot rule out all structural and chromosomal abnormalities.     See PACS/Imaging Tab For Complete Ultrasound Report  I interpreted the results and reviewed them with the patient.    DISCUSSION  During her visit we discussed and reviewed the following issues:   PRIOR  BIRTH  History  The patient had prior  PROM in a twin gestation.  Given pre-viable PROM EAB was elected.     Discussion:  See prior MFM notes for a detailed review.     Limitations of ultrasound reviewed.     IVF GESTATION  See prior [facility] notes for a detailed review.     PRE-GESTATIONAL  DIABETES  Managed by endocrinology  See prior [facility] notes for a detailed review.  HGBA1C:    Lab Results   Component Value Date    A1C 5.3 2025    A1C 5.5 2025    A1C 6.0 (H) 2025     2025          ADVANCED MATERNAL AGE  See prior [facility] notes for a detailed review.  She did not desire invasive genetic testing.   She also declined screening and diagnostic testing for fetal aneuploidy.  She feels that she will not alter the management of her pregnancy even in the presence of a known  fetal aneuploidy.    We reviewed the signs and symptoms of preeclampsia,  labor and monitoring fetal movement.  We discussed reasons for her to call her physician.      IMPRESSION:  IUP at 26w0d  Normal fetal growth  AMA declines aneuploidy screening and invasive testing.  Pregestational diabetes type 2 --managed by endocrinology  IVF pregnancy--PGTA low risk  Morbid obesity  Prior  x 1  Prior 20 week PROM and pre-viable delivery of twins     RECOMMENDATIONS:  Continue care with Dr. Chow  Four times daily capillary blood glucose assessments (fasting and 2 hour postprandial)  Weekly endocrine review of capillary blood glucose values  Follow-up growth ultrasound every 4 weeks in the third trimester with BPP at or beyond 32 weeks  Weekly NSTs at 32 weeks; twice weekly NST's at 34 weeks  Delivery at 38-39  weeks advised for medication controlled diabetes  Ophthalmology exam if not current  Insulin per endocrinology  Aspirin 162 mg daily    Thank you for allowing me to participate in the care of your patient.  Please do not hesitate to contact me if additional questions or concerns arise.      Oriana Yanez M.D.    20 minutes spent in review of records, patient consultation, documentation and coordination of care.  The relevant clinical matter(s) are summarized above.     Note to patient and family  The 21st Century Cures Act makes medical notes available to patients in the interest of transparency.   However, please be advised that this is a medical document.  It is intended as zchd-hs-rkum communication.  It is written and medical language may contain abbreviations or verbiage that are technical and unfamiliar.  It may appear blunt or direct.  Medical documents are intended to carry relevant information, facts as evident, and the clinical opinion of the practitioner.

## 2025-06-20 ENCOUNTER — ROUTINE PRENATAL (OUTPATIENT)
Dept: OBGYN CLINIC | Facility: CLINIC | Age: 35
End: 2025-06-20
Payer: COMMERCIAL

## 2025-06-20 VITALS
WEIGHT: 247.19 LBS | HEART RATE: 98 BPM | SYSTOLIC BLOOD PRESSURE: 112 MMHG | DIASTOLIC BLOOD PRESSURE: 72 MMHG | BODY MASS INDEX: 45 KG/M2

## 2025-06-20 DIAGNOSIS — Z34.92 ENCOUNTER FOR SUPERVISION OF NORMAL PREGNANCY IN SECOND TRIMESTER, UNSPECIFIED GRAVIDITY (HCC): Primary | ICD-10-CM

## 2025-06-20 PROCEDURE — 81002 URINALYSIS NONAUTO W/O SCOPE: CPT | Performed by: OBSTETRICS & GYNECOLOGY

## 2025-06-20 PROCEDURE — 3078F DIAST BP <80 MM HG: CPT | Performed by: OBSTETRICS & GYNECOLOGY

## 2025-06-20 PROCEDURE — 3074F SYST BP LT 130 MM HG: CPT | Performed by: OBSTETRICS & GYNECOLOGY

## 2025-06-25 ENCOUNTER — PATIENT MESSAGE (OUTPATIENT)
Dept: OBGYN CLINIC | Facility: CLINIC | Age: 35
End: 2025-06-25

## 2025-07-01 ENCOUNTER — PATIENT MESSAGE (OUTPATIENT)
Dept: ENDOCRINOLOGY CLINIC | Facility: CLINIC | Age: 35
End: 2025-07-01

## 2025-07-02 ENCOUNTER — OFFICE VISIT (OUTPATIENT)
Dept: ENDOCRINOLOGY CLINIC | Facility: CLINIC | Age: 35
End: 2025-07-02
Payer: COMMERCIAL

## 2025-07-02 VITALS
WEIGHT: 252 LBS | HEIGHT: 62.5 IN | BODY MASS INDEX: 45.22 KG/M2 | SYSTOLIC BLOOD PRESSURE: 109 MMHG | HEART RATE: 94 BPM | DIASTOLIC BLOOD PRESSURE: 66 MMHG

## 2025-07-02 DIAGNOSIS — O24.112 PREGNANCY COMPLICATED BY PRE-EXISTING TYPE 2 DIABETES IN SECOND TRIMESTER (HCC): Primary | ICD-10-CM

## 2025-07-02 LAB
GLUCOSE BLOOD: 133
HEMOGLOBIN A1C: 5.3 % (ref 4.3–5.6)
TEST STRIP LOT #: NORMAL NUMERIC

## 2025-07-02 PROCEDURE — 82947 ASSAY GLUCOSE BLOOD QUANT: CPT

## 2025-07-02 PROCEDURE — 3008F BODY MASS INDEX DOCD: CPT

## 2025-07-02 PROCEDURE — 3044F HG A1C LEVEL LT 7.0%: CPT

## 2025-07-02 PROCEDURE — 99213 OFFICE O/P EST LOW 20 MIN: CPT

## 2025-07-02 PROCEDURE — 3074F SYST BP LT 130 MM HG: CPT

## 2025-07-02 PROCEDURE — 83036 HEMOGLOBIN GLYCOSYLATED A1C: CPT

## 2025-07-02 PROCEDURE — 3078F DIAST BP <80 MM HG: CPT

## 2025-07-02 NOTE — PROGRESS NOTES
Name: Colette Mantilla Date: 7/2/25  Referring Physician: No ref. provider found  Dr. Emeli Sagastume - fertility specialist    CHIEF COMPLAINT   Follow up for DM      HISTORY OF PRESENT ILLNESS   Colette Mantilla  is a 31 year old female who presents for follow up on diabetes management.  5/2022 was seen during twin pregnancy which unfortunately did end in loss due to PPROM at 20 weeks.   Fertility treatments had been on hold for 6 months over the last year. She was transitioned off insulin and we started Mounjaro to help with glycemic control and weight loss in hopes of resuming fertility treatments. Was able to lose almost 50lbs with Mounjaro prior to resuming fertility treatments. Now off Mounjaro and has had a successful transfer with frozen embryo.  She is now 28 weeks pregnant with second child following successful IVF frozen embryo transfer.   FAMILY HISTORY OF DIABETES  -father- DM type 2  -Maternal grandmother- DM type 2  -Paternal grandmother- DM type 2    DIABETES HISTORY  Diagnosed: GDM with prior pregnancy in 2017, progressed to type 2 diabetes controlled with diet.   Prior HbA, C or glycohemoglobin were 6/2018-6.0%; 10/2020- 7.8%; 1/2021- 6.7%; 3/2022- 7.6%; 6.2% 5/2022; 7.2% 1/2023; 5.6% 5/2023; 6.0% 8/2023; 5.6% 1/2024; 5.8% 6/2024; 6.2% 1/2025; 6.0% 2/2025; 5.5% 3/2024; 5.3% 5/2025; 5.3% POC today   Previous DM medications:   Mounjaro 12.5mg subcutaneous weekly- now stopped since resuming fertility treatment and now subsequent pregnancy.   CURRENT MEDICATIONS FOR DM:  NPH- 72 units subcutaneous daily at bedtime   Novolog- 10-14-30 TID with meals   HOME GLUCOSE READINGS:   Hypoglycemia: Infrequent since last visit- occ. Fasting   Meter or log book today: Checking 3-4 times daily- fasting and post prandial readings  Reviewed logs:  Fasting- 83, 74, 94, 92, 70, 67  2hrs post breakfast- 95, 100, 117, 83, 96, 110  2hrs post lunch- 119, 110, 117, 105, 72, 124  2hrs post dinner- 114, 102, 118, 110, 119,  115    HISTORY OF DIABETES COMPLICATIONS:  History of Retinopathy: No - last eye exam within the last 12 months: Dr. Blackburn 4/2025  History of Neuropathy: No, denies symptoms  History of Nephropathy: No  ASSOCIATED COMPLICATIONS:   HTN: No   Hyperlipidemia: No   Coronary Artery Disease: No  Cerebrovascular Disease: No  Peripheral Vascular Disease: No    DIETARY COMPLIANCE:  Very Good- still watching carbohydrates and incorporating protein in meals   Breakfast- 2 hard boiled eggs with sausage, low carb egg wrap and ezekial bread, or sometimes skipping   Lunch- turkey Hummus wrap with berries   Dinner- side salad with chicken or chickpea pasta   Beverages- water, tea   Snacks- wheat crackers with cheese, yogurt  EXERCISE:   Has started walking - typically at least once daily     ROS:  Polyuria, polyphagia, polydipsia: no  Paresthesias: no, denies symptoms   Blurred vision: no  Recent steroids, illness or infections: No   Osteoporosis- No  Thyroid disease- No  MEDICATIONS:   Current Outpatient Medications on File Prior to Visit   Medication Sig Dispense Refill    Insulin NPH, Human,, Isophane, (NOVOLIN N FLEXPEN) 100 UNIT/ML Subcutaneous Suspension Pen-injector Inject 60 Units into the skin daily. (Patient taking differently: Inject 72 Units into the skin daily.) 54 mL 1    insulin aspart (NOVOLOG FLEXPEN) 100 Units/mL Subcutaneous Solution Pen-injector 10 units with breakfast, 14 units with lunch, 16 units with dinner. 36 mL 1    BABY ASPIRIN OR Take 1 tablet by mouth daily.      insulin aspart 100 UNIT/ML Subcutaneous Solution Cartridge Inject 12 Units into the skin once. 12 units with breakfast, 10 units with lunch, 12 units with dinner (Patient taking differently: Inject 12 Units into the skin once. 10 units with breakfast, 14 units with lunch, 30 units with dinner)      CONTOUR NEXT TEST In Vitro Strip USE TO TEST BLOOD SUGARS 4 TIMES DAILY 400 strip 0    Insulin Pen Needle (BD PEN NEEDLE PARAMJIT 2ND GEN) 32G X 4 MM  Does not apply Misc Use to inject insulin three times a day 300 each 0    folic acid 1 MG Oral Tab Take by mouth daily.      Microlet Lancets Does not apply Misc Use to test blood sugars 4 times daily 400 each 0    Glucose Blood (ONETOUCH VERIO) In Vitro Strip TEST 4 TIMES A  strip 3    Insulin Pen Needle (BD PEN NEEDLE ORIGINAL U/F) 29G X 12.7MM Does not apply Misc Use to inject insulin 4 times daily 400 each 0    OneTouch Delica Lancets 33G Does not apply Misc 4 x daily 200 each 3    Blood Glucose Monitoring Suppl (ONETOUCH VERIO FLEX SYSTEM) w/Device Does not apply Kit 1 kit in the morning, at noon, in the evening, and at bedtime. May substitute per insurance formulary 1 kit 3    prenatal vitamin w/DHA 27-0.8-228 MG Oral Cap Take 1 capsule by mouth daily.      Vitamin D3, Cholecalciferol, 50 MCG (2000 UT) Oral Tab Take 1 tablet (2,000 Units total) by mouth daily.      acidophilus-pectin Oral Cap Take 1 capsule by mouth daily. FERTILITY PROBIOTIC (Patient not taking: Reported on 7/2/2025)       No current facility-administered medications on file prior to visit.     ALLERGIES:   No Known Allergies    SOCIAL HISTORY:   Social History     Socioeconomic History    Marital status:      Spouse name: Not on file    Number of children: Not on file    Years of education: Not on file    Highest education level: Not on file   Occupational History    Not on file   Tobacco Use    Smoking status: Never    Smokeless tobacco: Never   Substance and Sexual Activity    Alcohol use: Never    Drug use: No    Sexual activity: Yes     Partners: Male     Birth control/protection: OCP   Other Topics Concern    Not on file   Social History Narrative    Not on file     Social Drivers of Health     Food Insecurity: No Food Insecurity (2/20/2025)    NCSS - Food Insecurity     Worried About Running Out of Food in the Last Year: No     Ran Out of Food in the Last Year: No   Transportation Needs: No Transportation Needs  (2025)    NCSS - Transportation     Lack of Transportation: No   Stress: No Stress Concern Present (2025)    Stress     Feeling of Stress : No   Housing Stability: Not At Risk (2025)    NCSS - Housing/Utilities     Has Housing: Yes     Worried About Losing Housing: No     Unable to Get Utilities: No     PAST MEDICAL HISTORY:   Past Medical History:    Diabetes (HCC)    History of varicella as a child    Infertility, female     PAST SURGICAL HISTORY:   Past Surgical History:   Procedure Laterality Date      2017    Cholecystectomy      Lasik Bilateral ~    Dr. Nathan Garcia        PHYSICAL EXAM:   Vitals:    25 1021   BP: 109/66   Pulse: 94       General Appearance:  alert, well developed, in no acute distress  Eyes:  pupils are equal, round and reactive. Normal conjunctivae and normal sclera   Neck: Trachea midline: Normal.   Back: no kyphosis or back tenderness  Lymph Nodes:  No abnormal nodes noted  Musculoskeletal:  normal muscle strength and tone  Skin:  normal moisture and skin texture  Hair & Nails:  normal scalp hair     Hematologic:  no excessive bruising  Neuro:  sensory grossly intact and motor grossly intact  Psychiatric:  oriented to time, self, and place  Nutritional:  no abnormal weight gain or loss    LABS: Pertinent labs reviewed  ASSESSMENT/PLAN:    Diabetes mellitus type 2 controlled  -HgA1c- 5.3 % - at goal   -Congratulated patient on continued improved glycemic control and on positive pregnancy test  -Reviewed with patient the pathogenesis of diabetes, clinical significance of A1c, and common complications such as: microvascular, macrovascular and diabetic ketoacidosis. Patient verbalizes understanding of the importance of glycemic control and the goals of therapy.   -Discussed with patient glucose targets ranges for preconception and pregnancy (Fasting <90 and 2hr post prandial <120).   -reviewed limiting carbs to 165gm of carbs/day.   -discussed importance of  incorporating low carb snacks during the day and at bedtime  -reviewed blood sugar target goals: fasting <90 and 2hrs post prandial <120  -reviewed necessity to send us her BG readings every 4-7 days for review during pregnancy  -Discussed dangers of uncontrolled diabetes in pregnancy and possible fetal complications.   -Discussed importance of glycemic control for maternal and fetal health  -Discussed insulin injection site rotation.   - Continue NPH 72 units subcutaneous daily.   - Continue Novolog 10-14-30 TID with meals. Reviewed insulin timing and administration.  - We had used 24 hour basal insulin previously but she felt fasting sugars better controlled with NPH  -Continue to check sugars 4 times daily- fasting and post prandial    -She will send glucose readings via [employer] in 1 week.  -normotensive   -lipids 6/2024- LDL slightly elevated at 108. Will address after pregnancy  -no nephropathy 6/2024  -Recheck labs postpartum  -Reviewed importance of yearly optho follow up - UTD with optho  -normal foot exam   - Reviewed monthly endocrine follow up during pregnancy.  -She is not planning to breastfeed at this time- would like to consider restarting Mounjaro after delivery.   RTC in 5 weeks  [employer] [place]  7/2/25  A total of  25 minutes was spent on obtaining history, reviewing pertinent imaging/labs and specialists notes, evaluating patient, providing multiple treatment options, reinforcing diet/exercise and compliance, and completing documentation.

## 2025-07-03 ENCOUNTER — PATIENT MESSAGE (OUTPATIENT)
Dept: OBGYN CLINIC | Facility: CLINIC | Age: 35
End: 2025-07-03

## 2025-07-09 ENCOUNTER — PATIENT MESSAGE (OUTPATIENT)
Dept: OBGYN CLINIC | Facility: CLINIC | Age: 35
End: 2025-07-09

## 2025-07-15 NOTE — PROGRESS NOTES
Outpatient Maternal-Fetal Medicine Follow-Up     Dear Dr. Baird     Thank you for requesting ultrasound evaluation and maternal fetal medicine consultation on your patient Colette Mantilla.  As you are aware she is a 34 year old female  with a castaneda pregnancy and an Estimated Date of Delivery: 25.  She returned to maternal-fetal medicine today for a follow-up visit.  Her history was reviewed from her prior visit and there were no interval changes.     Antepartum Risk Factors  AMA declines aneuploidy screening and invasive testing.  Diabetes type 2 --managed by endocrinology  IVF pregnancy--PGTA low risk  Morbid obesity  Prior  x 1  Prior 20 week PROM and pre-viable delivery of twins     PHYSICAL EXAMINATION:  /70   Pulse 103   Wt 251 lb (113.9 kg)   LMP 2024 (Approximate)   BMI 45.18 kg/m²   General: alert and oriented, no acute distress  Abdomen: gravid, soft, non-tender  Extremities: non-tender, no edema     OBSTETRIC ULTRASOUND     Ultrasound Findings:    Single IUP in breech presentation.    Placenta is posterior.   A 3 vessel cord is noted.  Cardiac activity is present at 145 bpm  EFW 1527 g ( 3 lb 6 oz); 51%.    MVP is 4.4 cm . RADHA 10.3 cm    The fetal measurements are consistent with established RITO. No gross ultrasound evidence of structural abnormalities are seen today. The patient understands that ultrasound cannot rule out all structural and chromosomal abnormalities.     See PACS/Imaging Tab For Complete Ultrasound Report  I interpreted the results and reviewed them with the patient.     DISCUSSION  During her visit we discussed and reviewed the following issues:   PRIOR  BIRTH  History  The patient had prior  PROM in a twin gestation.  Given pre-viable PROM EAB was elected.     Discussion:  See prior MFM notes for a detailed review.     Limitations of ultrasound reviewed.     IVF GESTATION  See prior [facility] notes for a detailed review.     PRE-GESTATIONAL  DIABETES  Managed by endocrinology  See prior [facility] notes for a detailed review.  HGBA1C:          Lab Results   Component Value Date     A1C 5.3 2025     A1C 5.5 2025     A1C 6.0 (H) 2025      2025         ADVANCED MATERNAL AGE  See prior [facility] notes for a detailed review.  She did not desire invasive genetic testing.   She also declined screening and diagnostic testing for fetal aneuploidy.  She feels that she will not alter the management of her pregnancy even in the presence of a known  fetal aneuploidy.     IMPRESSION:  IUP at 30w1d  AMA declines aneuploidy screening and invasive testing.  Pregestational diabetes type 2 --managed by endocrinology  IVF pregnancy--PGTA low risk  Morbid obesity  Prior  x 1  Prior 20 week PROM and pre-viable delivery of twins     RECOMMENDATIONS:  Continue care with Dr. Chow  Four times daily capillary blood glucose assessments (fasting and 2 hour postprandial)  Weekly endocrine review of capillary blood glucose values  Follow-up growth ultrasound every 4 weeks in the third trimester with BPP at or beyond 32 weeks  Weekly NSTs at 32 weeks; twice weekly NST's at 34 weeks  Delivery at 38-39  weeks advised for medication controlled diabetes  Ophthalmology exam if not current  Insulin per endocrinology  Aspirin 162 mg daily     Thank you for allowing me to participate in the care of your patient.  Please do not hesitate to contact me if additional questions or concerns arise.       Vincent Chino M.D.  Maternal-Fetal Medicine       20 minutes spent in review of records, patient consultation, documentation and coordination of care.  The relevant clinical matter(s) are summarized above.      Note to patient and family  The 21st Century Cures Act makes medical notes available to patients in the interest of transparency.  However, please be advised that this is a medical document.  It is intended as fked-mk-ckfk communication.  It is written and medical  language may contain abbreviations or verbiage that are technical and unfamiliar.  It may appear blunt or direct.  Medical documents are intended to carry relevant information, facts as evident, and the clinical opinion of the practitioner.

## 2025-07-16 ENCOUNTER — ROUTINE PRENATAL (OUTPATIENT)
Dept: OBGYN CLINIC | Facility: CLINIC | Age: 35
End: 2025-07-16
Payer: COMMERCIAL

## 2025-07-16 ENCOUNTER — PATIENT MESSAGE (OUTPATIENT)
Dept: OBGYN CLINIC | Facility: CLINIC | Age: 35
End: 2025-07-16

## 2025-07-16 ENCOUNTER — TELEPHONE (OUTPATIENT)
Dept: OBGYN CLINIC | Facility: CLINIC | Age: 35
End: 2025-07-16

## 2025-07-16 VITALS
WEIGHT: 251.63 LBS | BODY MASS INDEX: 45 KG/M2 | DIASTOLIC BLOOD PRESSURE: 71 MMHG | SYSTOLIC BLOOD PRESSURE: 115 MMHG | HEART RATE: 101 BPM

## 2025-07-16 DIAGNOSIS — Z34.92 ENCOUNTER FOR SUPERVISION OF NORMAL PREGNANCY IN SECOND TRIMESTER, UNSPECIFIED GRAVIDITY (HCC): Primary | ICD-10-CM

## 2025-07-16 LAB
APPEARANCE: CLEAR
BILIRUBIN: NEGATIVE
GLUCOSE (URINE DIPSTICK): NEGATIVE MG/DL
KETONES (URINE DIPSTICK): NEGATIVE MG/DL
LEUKOCYTES: NEGATIVE
MULTISTIX LOT#: ABNORMAL NUMERIC
NITRITE, URINE: NEGATIVE
PH, URINE: 7 (ref 4.5–8)
SPECIFIC GRAVITY: 1.01 (ref 1–1.03)
URINE-COLOR: YELLOW
UROBILINOGEN,SEMI-QN: 0.2 MG/DL (ref 0–1.9)

## 2025-07-16 PROCEDURE — 3074F SYST BP LT 130 MM HG: CPT | Performed by: OBSTETRICS & GYNECOLOGY

## 2025-07-16 PROCEDURE — 3078F DIAST BP <80 MM HG: CPT | Performed by: OBSTETRICS & GYNECOLOGY

## 2025-07-16 PROCEDURE — 90715 TDAP VACCINE 7 YRS/> IM: CPT | Performed by: OBSTETRICS & GYNECOLOGY

## 2025-07-16 PROCEDURE — 81002 URINALYSIS NONAUTO W/O SCOPE: CPT | Performed by: OBSTETRICS & GYNECOLOGY

## 2025-07-16 PROCEDURE — 90471 IMMUNIZATION ADMIN: CPT | Performed by: OBSTETRICS & GYNECOLOGY

## 2025-07-16 NOTE — TELEPHONE ENCOUNTER
Please schedule the following surgery:    Procedure: Repeat CS    Date:  with ALFONZO    Diagnosis: History of  section, GDMB    Admission:Yes    Anesth: spinal

## 2025-07-16 NOTE — TELEPHONE ENCOUNTER
Spoke to patient aware section is scheduled on Fri,9/12 at 130p with Dr. Renee.    Reached out to [facility] for assist    Labor and delivery instructions sent, antimicrobial wash instructions sent , and enhanced recovery instructions sent. Via [email or web address]    Delayed staff message sent to RN pool to please place pre-op orders    Delayed staff message sent to MD to please place pre-op orders    Entered in book and calendar

## 2025-07-18 ENCOUNTER — HOSPITAL ENCOUNTER (OUTPATIENT)
Dept: PERINATAL CARE | Facility: HOSPITAL | Age: 35
Discharge: HOME | End: 2025-07-18
Attending: OBSTETRICS & GYNECOLOGY
Payer: COMMERCIAL

## 2025-07-18 ENCOUNTER — LAB ENCOUNTER (OUTPATIENT)
Dept: LAB | Facility: HOSPITAL | Age: 35
End: 2025-07-18
Attending: OBSTETRICS & GYNECOLOGY
Payer: COMMERCIAL

## 2025-07-18 VITALS
DIASTOLIC BLOOD PRESSURE: 70 MMHG | HEART RATE: 103 BPM | SYSTOLIC BLOOD PRESSURE: 122 MMHG | WEIGHT: 251 LBS | BODY MASS INDEX: 45 KG/M2

## 2025-07-18 DIAGNOSIS — Z79.4 TYPE 2 DIABETES MELLITUS WITHOUT COMPLICATION, WITH LONG-TERM CURRENT USE OF INSULIN (HCC): ICD-10-CM

## 2025-07-18 DIAGNOSIS — E11.9 TYPE 2 DIABETES MELLITUS WITHOUT COMPLICATION, WITH LONG-TERM CURRENT USE OF INSULIN (HCC): ICD-10-CM

## 2025-07-18 DIAGNOSIS — Z98.891 PREVIOUS CESAREAN SECTION: ICD-10-CM

## 2025-07-18 DIAGNOSIS — O99.213 OBESITY AFFECTING PREGNANCY IN THIRD TRIMESTER, UNSPECIFIED OBESITY TYPE (HCC): ICD-10-CM

## 2025-07-18 DIAGNOSIS — Z34.92 ENCOUNTER FOR SUPERVISION OF NORMAL PREGNANCY IN SECOND TRIMESTER, UNSPECIFIED GRAVIDITY (HCC): ICD-10-CM

## 2025-07-18 DIAGNOSIS — Z87.59 HISTORY OF PRETERM PREMATURE RUPTURE OF MEMBRANES (PPROM): ICD-10-CM

## 2025-07-18 DIAGNOSIS — Z78.9 CONCEIVED BY IN VITRO FERTILIZATION: ICD-10-CM

## 2025-07-18 DIAGNOSIS — O09.813 PREGNANCY RESULTING FROM ASSISTED REPRODUCTIVE TECHNOLOGY IN THIRD TRIMESTER (HCC): ICD-10-CM

## 2025-07-18 DIAGNOSIS — O24.319 PREGESTATIONAL DIABETES MELLITUS, MODIFIED WHITE CLASS B (HCC): ICD-10-CM

## 2025-07-18 DIAGNOSIS — O99.213 OBESITY AFFECTING PREGNANCY IN THIRD TRIMESTER, UNSPECIFIED OBESITY TYPE (HCC): Primary | ICD-10-CM

## 2025-07-18 LAB — T PALLIDUM AB SER QL IA: NONREACTIVE

## 2025-07-18 PROCEDURE — 76816 OB US FOLLOW-UP PER FETUS: CPT | Performed by: OBSTETRICS & GYNECOLOGY

## 2025-07-18 PROCEDURE — 36415 COLL VENOUS BLD VENIPUNCTURE: CPT

## 2025-07-18 PROCEDURE — 86780 TREPONEMA PALLIDUM: CPT

## 2025-07-18 PROCEDURE — 87389 HIV-1 AG W/HIV-1&-2 AB AG IA: CPT

## 2025-07-18 PROCEDURE — 76819 FETAL BIOPHYS PROFIL W/O NST: CPT

## 2025-07-23 ENCOUNTER — PATIENT MESSAGE (OUTPATIENT)
Dept: OBGYN CLINIC | Facility: CLINIC | Age: 35
End: 2025-07-23

## 2025-07-30 ENCOUNTER — PATIENT MESSAGE (OUTPATIENT)
Dept: OBGYN CLINIC | Facility: CLINIC | Age: 35
End: 2025-07-30

## 2025-08-01 ENCOUNTER — APPOINTMENT (OUTPATIENT)
Dept: OBGYN CLINIC | Facility: HOSPITAL | Age: 35
End: 2025-08-01

## 2025-08-01 ENCOUNTER — ROUTINE PRENATAL (OUTPATIENT)
Dept: OBGYN CLINIC | Facility: CLINIC | Age: 35
End: 2025-08-01

## 2025-08-01 ENCOUNTER — NST DOCUMENTATION (OUTPATIENT)
Dept: OBGYN CLINIC | Facility: CLINIC | Age: 35
End: 2025-08-01

## 2025-08-01 ENCOUNTER — HOSPITAL ENCOUNTER (OUTPATIENT)
Facility: HOSPITAL | Age: 35
Discharge: HOME | End: 2025-08-01
Attending: OBSTETRICS & GYNECOLOGY | Admitting: OBSTETRICS & GYNECOLOGY

## 2025-08-01 VITALS
SYSTOLIC BLOOD PRESSURE: 126 MMHG | BODY MASS INDEX: 46 KG/M2 | WEIGHT: 252.81 LBS | DIASTOLIC BLOOD PRESSURE: 75 MMHG | HEART RATE: 103 BPM

## 2025-08-01 VITALS
SYSTOLIC BLOOD PRESSURE: 130 MMHG | RESPIRATION RATE: 16 BRPM | TEMPERATURE: 98 F | DIASTOLIC BLOOD PRESSURE: 65 MMHG | HEART RATE: 95 BPM

## 2025-08-01 DIAGNOSIS — Z34.93 ENCOUNTER FOR SUPERVISION OF NORMAL PREGNANCY IN THIRD TRIMESTER, UNSPECIFIED GRAVIDITY (HCC): Primary | ICD-10-CM

## 2025-08-01 DIAGNOSIS — Z34.90 PREGNANCY (HCC): ICD-10-CM

## 2025-08-01 LAB
APPEARANCE: CLEAR
BILIRUBIN: NEGATIVE
GLUCOSE (URINE DIPSTICK): NEGATIVE MG/DL
KETONES (URINE DIPSTICK): 40 MG/DL
LEUKOCYTES: NEGATIVE
MULTISTIX LOT#: ABNORMAL NUMERIC
NITRITE, URINE: NEGATIVE
OCCULT BLOOD: NEGATIVE
PH, URINE: 6.5 (ref 4.5–8)
PROTEIN (URINE DIPSTICK): NEGATIVE MG/DL
SPECIFIC GRAVITY: 1.01 (ref 1–1.03)
URINE-COLOR: YELLOW
UROBILINOGEN,SEMI-QN: 0.2 MG/DL (ref 0–1.9)

## 2025-08-01 PROCEDURE — 59025 FETAL NON-STRESS TEST: CPT | Performed by: OBSTETRICS & GYNECOLOGY

## 2025-08-01 PROCEDURE — 3078F DIAST BP <80 MM HG: CPT | Performed by: OBSTETRICS & GYNECOLOGY

## 2025-08-01 PROCEDURE — 3074F SYST BP LT 130 MM HG: CPT | Performed by: OBSTETRICS & GYNECOLOGY

## 2025-08-01 PROCEDURE — 59025 FETAL NON-STRESS TEST: CPT

## 2025-08-01 PROCEDURE — 81002 URINALYSIS NONAUTO W/O SCOPE: CPT | Performed by: OBSTETRICS & GYNECOLOGY

## 2025-08-06 ENCOUNTER — PATIENT MESSAGE (OUTPATIENT)
Dept: OBGYN CLINIC | Facility: CLINIC | Age: 35
End: 2025-08-06

## 2025-08-08 ENCOUNTER — HOSPITAL ENCOUNTER (OUTPATIENT)
Facility: HOSPITAL | Age: 35
Discharge: HOME | End: 2025-08-08
Attending: OBSTETRICS & GYNECOLOGY | Admitting: OBSTETRICS & GYNECOLOGY

## 2025-08-08 ENCOUNTER — APPOINTMENT (OUTPATIENT)
Dept: OBGYN CLINIC | Facility: HOSPITAL | Age: 35
End: 2025-08-08

## 2025-08-08 VITALS — HEART RATE: 96 BPM | DIASTOLIC BLOOD PRESSURE: 57 MMHG | SYSTOLIC BLOOD PRESSURE: 128 MMHG

## 2025-08-08 DIAGNOSIS — Z34.90 PREGNANCY (HCC): ICD-10-CM

## 2025-08-08 PROCEDURE — 59025 FETAL NON-STRESS TEST: CPT | Performed by: OBSTETRICS & GYNECOLOGY

## 2025-08-08 PROCEDURE — 59025 FETAL NON-STRESS TEST: CPT

## 2025-08-13 ENCOUNTER — PATIENT MESSAGE (OUTPATIENT)
Dept: OBGYN CLINIC | Facility: CLINIC | Age: 35
End: 2025-08-13

## 2025-08-13 ENCOUNTER — OFFICE VISIT (OUTPATIENT)
Dept: ENDOCRINOLOGY CLINIC | Facility: CLINIC | Age: 35
End: 2025-08-13

## 2025-08-13 VITALS
BODY MASS INDEX: 47.48 KG/M2 | HEART RATE: 94 BPM | WEIGHT: 258 LBS | SYSTOLIC BLOOD PRESSURE: 135 MMHG | DIASTOLIC BLOOD PRESSURE: 72 MMHG | HEIGHT: 62 IN

## 2025-08-13 DIAGNOSIS — O24.113: Primary | ICD-10-CM

## 2025-08-13 LAB
GLUCOSE BLOOD: 122
HEMOGLOBIN A1C: 5.4 % (ref 4.3–5.6)
TEST STRIP LOT #: NORMAL NUMERIC

## 2025-08-13 PROCEDURE — 3008F BODY MASS INDEX DOCD: CPT

## 2025-08-13 PROCEDURE — 82947 ASSAY GLUCOSE BLOOD QUANT: CPT

## 2025-08-13 PROCEDURE — 83036 HEMOGLOBIN GLYCOSYLATED A1C: CPT

## 2025-08-13 PROCEDURE — 99213 OFFICE O/P EST LOW 20 MIN: CPT

## 2025-08-13 PROCEDURE — 3075F SYST BP GE 130 - 139MM HG: CPT

## 2025-08-13 PROCEDURE — 3044F HG A1C LEVEL LT 7.0%: CPT

## 2025-08-13 PROCEDURE — 3078F DIAST BP <80 MM HG: CPT

## 2025-08-13 RX ORDER — TIRZEPATIDE 5 MG/.5ML
5 INJECTION, SOLUTION SUBCUTANEOUS WEEKLY
Qty: 2 ML | Refills: 0 | Status: SHIPPED | OUTPATIENT
Start: 2025-09-15

## 2025-08-15 ENCOUNTER — NST DOCUMENTATION (OUTPATIENT)
Dept: OBGYN CLINIC | Facility: CLINIC | Age: 35
End: 2025-08-15

## 2025-08-15 ENCOUNTER — HOSPITAL ENCOUNTER (OUTPATIENT)
Dept: PERINATAL CARE | Facility: HOSPITAL | Age: 35
Discharge: HOME | End: 2025-08-15
Attending: OBSTETRICS & GYNECOLOGY

## 2025-08-15 ENCOUNTER — ROUTINE PRENATAL (OUTPATIENT)
Dept: OBGYN CLINIC | Facility: CLINIC | Age: 35
End: 2025-08-15

## 2025-08-15 VITALS
WEIGHT: 258 LBS | BODY MASS INDEX: 47 KG/M2 | HEART RATE: 99 BPM | SYSTOLIC BLOOD PRESSURE: 115 MMHG | DIASTOLIC BLOOD PRESSURE: 69 MMHG

## 2025-08-15 VITALS
WEIGHT: 258 LBS | SYSTOLIC BLOOD PRESSURE: 113 MMHG | DIASTOLIC BLOOD PRESSURE: 68 MMHG | HEART RATE: 99 BPM | BODY MASS INDEX: 47 KG/M2

## 2025-08-15 DIAGNOSIS — O24.319 PREGESTATIONAL DIABETES MELLITUS, MODIFIED WHITE CLASS B (HCC): ICD-10-CM

## 2025-08-15 DIAGNOSIS — O99.213 OBESITY AFFECTING PREGNANCY IN THIRD TRIMESTER, UNSPECIFIED OBESITY TYPE (HCC): ICD-10-CM

## 2025-08-15 DIAGNOSIS — E11.9 TYPE 2 DIABETES MELLITUS WITHOUT COMPLICATION, WITH LONG-TERM CURRENT USE OF INSULIN (HCC): ICD-10-CM

## 2025-08-15 DIAGNOSIS — O09.813 PREGNANCY RESULTING FROM ASSISTED REPRODUCTIVE TECHNOLOGY IN THIRD TRIMESTER (HCC): ICD-10-CM

## 2025-08-15 DIAGNOSIS — O09.523 AMA (ADVANCED MATERNAL AGE) MULTIGRAVIDA 35+, THIRD TRIMESTER (HCC): ICD-10-CM

## 2025-08-15 DIAGNOSIS — Z87.59 HISTORY OF PRETERM PREMATURE RUPTURE OF MEMBRANES (PPROM): ICD-10-CM

## 2025-08-15 DIAGNOSIS — O99.213 OBESITY AFFECTING PREGNANCY IN THIRD TRIMESTER, UNSPECIFIED OBESITY TYPE (HCC): Primary | ICD-10-CM

## 2025-08-15 DIAGNOSIS — Z34.93 ENCOUNTER FOR SUPERVISION OF NORMAL PREGNANCY IN THIRD TRIMESTER, UNSPECIFIED GRAVIDITY (HCC): Primary | ICD-10-CM

## 2025-08-15 DIAGNOSIS — Z79.4 TYPE 2 DIABETES MELLITUS WITHOUT COMPLICATION, WITH LONG-TERM CURRENT USE OF INSULIN (HCC): ICD-10-CM

## 2025-08-15 LAB
APPEARANCE: CLEAR
BILIRUBIN: NEGATIVE
GLUCOSE (URINE DIPSTICK): NEGATIVE MG/DL
KETONES (URINE DIPSTICK): 15 MG/DL
LEUKOCYTES: NEGATIVE
MULTISTIX LOT#: ABNORMAL NUMERIC
NITRITE, URINE: NEGATIVE
OCCULT BLOOD: NEGATIVE
PH, URINE: 7 (ref 4.5–8)
PROTEIN (URINE DIPSTICK): NEGATIVE MG/DL
SPECIFIC GRAVITY: 1.01 (ref 1–1.03)
URINE-COLOR: YELLOW
UROBILINOGEN,SEMI-QN: 0.2 MG/DL (ref 0–1.9)

## 2025-08-15 PROCEDURE — 3078F DIAST BP <80 MM HG: CPT | Performed by: OBSTETRICS & GYNECOLOGY

## 2025-08-15 PROCEDURE — 81002 URINALYSIS NONAUTO W/O SCOPE: CPT | Performed by: OBSTETRICS & GYNECOLOGY

## 2025-08-15 PROCEDURE — 3074F SYST BP LT 130 MM HG: CPT | Performed by: OBSTETRICS & GYNECOLOGY

## 2025-08-15 PROCEDURE — 76816 OB US FOLLOW-UP PER FETUS: CPT | Performed by: OBSTETRICS & GYNECOLOGY

## 2025-08-15 PROCEDURE — 76819 FETAL BIOPHYS PROFIL W/O NST: CPT

## 2025-08-19 ENCOUNTER — HOSPITAL ENCOUNTER (OUTPATIENT)
Dept: PERINATAL CARE | Facility: HOSPITAL | Age: 35
Discharge: HOME | End: 2025-08-19
Attending: OBSTETRICS & GYNECOLOGY

## 2025-08-19 ENCOUNTER — NST DOCUMENTATION (OUTPATIENT)
Dept: OBGYN CLINIC | Facility: CLINIC | Age: 35
End: 2025-08-19

## 2025-08-19 DIAGNOSIS — Z78.9 CONCEIVED BY IN VITRO FERTILIZATION: ICD-10-CM

## 2025-08-19 DIAGNOSIS — Z79.4 TYPE 2 DIABETES MELLITUS WITHOUT COMPLICATION, WITH LONG-TERM CURRENT USE OF INSULIN (HCC): ICD-10-CM

## 2025-08-19 DIAGNOSIS — E11.9 TYPE 2 DIABETES MELLITUS WITHOUT COMPLICATION, WITH LONG-TERM CURRENT USE OF INSULIN (HCC): ICD-10-CM

## 2025-08-19 DIAGNOSIS — O99.210 OBESITY COMPLICATING PREGNANCY (HCC): Primary | ICD-10-CM

## 2025-08-19 PROCEDURE — 59025 FETAL NON-STRESS TEST: CPT

## 2025-08-19 PROCEDURE — 59025 FETAL NON-STRESS TEST: CPT | Performed by: OBSTETRICS & GYNECOLOGY

## 2025-08-20 ENCOUNTER — PATIENT MESSAGE (OUTPATIENT)
Dept: OBGYN CLINIC | Facility: CLINIC | Age: 35
End: 2025-08-20

## 2025-08-22 ENCOUNTER — HOSPITAL ENCOUNTER (OUTPATIENT)
Dept: PERINATAL CARE | Facility: HOSPITAL | Age: 35
Discharge: HOME | End: 2025-08-22
Attending: OBSTETRICS & GYNECOLOGY

## 2025-08-22 DIAGNOSIS — Z79.4 TYPE 2 DIABETES MELLITUS WITHOUT COMPLICATION, WITH LONG-TERM CURRENT USE OF INSULIN (HCC): ICD-10-CM

## 2025-08-22 DIAGNOSIS — Z78.9 CONCEIVED BY IN VITRO FERTILIZATION: ICD-10-CM

## 2025-08-22 DIAGNOSIS — E11.9 TYPE 2 DIABETES MELLITUS WITHOUT COMPLICATION, WITH LONG-TERM CURRENT USE OF INSULIN (HCC): ICD-10-CM

## 2025-08-22 PROCEDURE — 59025 FETAL NON-STRESS TEST: CPT

## 2025-08-26 ENCOUNTER — HOSPITAL ENCOUNTER (OUTPATIENT)
Dept: PERINATAL CARE | Facility: HOSPITAL | Age: 35
Discharge: HOME | End: 2025-08-26
Attending: OBSTETRICS & GYNECOLOGY

## 2025-08-26 ENCOUNTER — NST DOCUMENTATION (OUTPATIENT)
Dept: OBGYN CLINIC | Facility: CLINIC | Age: 35
End: 2025-08-26

## 2025-08-26 ENCOUNTER — LAB ENCOUNTER (OUTPATIENT)
Dept: LAB | Facility: HOSPITAL | Age: 35
End: 2025-08-26
Attending: OBSTETRICS & GYNECOLOGY

## 2025-08-26 VITALS — DIASTOLIC BLOOD PRESSURE: 66 MMHG | SYSTOLIC BLOOD PRESSURE: 139 MMHG | HEART RATE: 80 BPM

## 2025-08-26 DIAGNOSIS — Z79.4 TYPE 2 DIABETES MELLITUS WITHOUT COMPLICATION, WITH LONG-TERM CURRENT USE OF INSULIN (HCC): ICD-10-CM

## 2025-08-26 DIAGNOSIS — O99.213 OBESITY AFFECTING PREGNANCY IN THIRD TRIMESTER, UNSPECIFIED OBESITY TYPE (HCC): Primary | ICD-10-CM

## 2025-08-26 DIAGNOSIS — Z34.93 ENCOUNTER FOR SUPERVISION OF NORMAL PREGNANCY IN THIRD TRIMESTER, UNSPECIFIED GRAVIDITY (HCC): ICD-10-CM

## 2025-08-26 DIAGNOSIS — Z78.9 CONCEIVED BY IN VITRO FERTILIZATION: ICD-10-CM

## 2025-08-26 DIAGNOSIS — E11.9 TYPE 2 DIABETES MELLITUS WITHOUT COMPLICATION, WITH LONG-TERM CURRENT USE OF INSULIN (HCC): ICD-10-CM

## 2025-08-26 LAB
DEPRECATED RDW RBC AUTO: 47.8 FL (ref 35.1–46.3)
ERYTHROCYTE [DISTWIDTH] IN BLOOD BY AUTOMATED COUNT: 14.6 % (ref 11–15)
HCT VFR BLD AUTO: 33.2 % (ref 35–48)
HGB BLD-MCNC: 10.9 G/DL (ref 12–16)
MCH RBC QN AUTO: 29.2 PG (ref 26–34)
MCHC RBC AUTO-ENTMCNC: 32.8 G/DL (ref 31–37)
MCV RBC AUTO: 89 FL (ref 80–100)
PLATELET # BLD AUTO: 161 10(3)UL (ref 150–450)
RBC # BLD AUTO: 3.73 X10(6)UL (ref 3.8–5.3)
WBC # BLD AUTO: 8.6 X10(3) UL (ref 4–11)

## 2025-08-26 PROCEDURE — 36415 COLL VENOUS BLD VENIPUNCTURE: CPT

## 2025-08-26 PROCEDURE — 59025 FETAL NON-STRESS TEST: CPT | Performed by: OBSTETRICS & GYNECOLOGY

## 2025-08-26 PROCEDURE — 59025 FETAL NON-STRESS TEST: CPT

## 2025-08-26 PROCEDURE — 85027 COMPLETE CBC AUTOMATED: CPT

## 2025-08-27 ENCOUNTER — PATIENT MESSAGE (OUTPATIENT)
Dept: OBGYN CLINIC | Facility: CLINIC | Age: 35
End: 2025-08-27

## 2025-08-29 ENCOUNTER — TELEPHONE (OUTPATIENT)
Dept: OBGYN CLINIC | Facility: CLINIC | Age: 35
End: 2025-08-29

## 2025-08-29 ENCOUNTER — ROUTINE PRENATAL (OUTPATIENT)
Dept: OBGYN CLINIC | Facility: CLINIC | Age: 35
End: 2025-08-29

## 2025-08-29 ENCOUNTER — HOSPITAL ENCOUNTER (OUTPATIENT)
Dept: PERINATAL CARE | Facility: HOSPITAL | Age: 35
Discharge: HOME | End: 2025-08-29
Attending: OBSTETRICS & GYNECOLOGY

## 2025-08-29 ENCOUNTER — PATIENT MESSAGE (OUTPATIENT)
Dept: OBGYN CLINIC | Facility: CLINIC | Age: 35
End: 2025-08-29

## 2025-08-29 VITALS
BODY MASS INDEX: 48 KG/M2 | SYSTOLIC BLOOD PRESSURE: 109 MMHG | WEIGHT: 261.19 LBS | DIASTOLIC BLOOD PRESSURE: 70 MMHG | HEART RATE: 102 BPM

## 2025-08-29 VITALS — HEART RATE: 80 BPM | SYSTOLIC BLOOD PRESSURE: 109 MMHG | DIASTOLIC BLOOD PRESSURE: 70 MMHG

## 2025-08-29 DIAGNOSIS — O99.213 OBESITY AFFECTING PREGNANCY IN THIRD TRIMESTER, UNSPECIFIED OBESITY TYPE (HCC): Primary | ICD-10-CM

## 2025-08-29 DIAGNOSIS — Z34.90 PREGNANCY, UNSPECIFIED GESTATIONAL AGE (HCC): ICD-10-CM

## 2025-08-29 DIAGNOSIS — O24.319 PREGESTATIONAL DIABETES MELLITUS, MODIFIED WHITE CLASS B (HCC): ICD-10-CM

## 2025-08-29 DIAGNOSIS — Z34.83 ENCOUNTER FOR SUPERVISION OF OTHER NORMAL PREGNANCY IN THIRD TRIMESTER (HCC): Primary | ICD-10-CM

## 2025-08-29 DIAGNOSIS — Z01.812 PRE-OPERATIVE LABORATORY EXAMINATION: Primary | ICD-10-CM

## 2025-08-29 DIAGNOSIS — Z78.9 CONCEIVED BY IN VITRO FERTILIZATION: ICD-10-CM

## 2025-08-29 LAB
APPEARANCE: CLEAR
BILIRUBIN: NEGATIVE
GLUCOSE (URINE DIPSTICK): NEGATIVE MG/DL
KETONES (URINE DIPSTICK): 40 MG/DL
LEUKOCYTES: NEGATIVE
MULTISTIX LOT#: ABNORMAL NUMERIC
NITRITE, URINE: NEGATIVE
PH, URINE: 6 (ref 4.5–8)
PROTEIN (URINE DIPSTICK): NEGATIVE MG/DL
SPECIFIC GRAVITY: 1.01 (ref 1–1.03)
URINE-COLOR: YELLOW
UROBILINOGEN,SEMI-QN: 0.2 MG/DL (ref 0–1.9)

## 2025-08-29 PROCEDURE — 59025 FETAL NON-STRESS TEST: CPT

## 2025-08-29 PROCEDURE — 81002 URINALYSIS NONAUTO W/O SCOPE: CPT | Performed by: OBSTETRICS & GYNECOLOGY

## 2025-08-29 PROCEDURE — 3074F SYST BP LT 130 MM HG: CPT | Performed by: OBSTETRICS & GYNECOLOGY

## 2025-08-29 PROCEDURE — 3078F DIAST BP <80 MM HG: CPT | Performed by: OBSTETRICS & GYNECOLOGY

## 2025-08-30 ENCOUNTER — NST DOCUMENTATION (OUTPATIENT)
Dept: OBGYN CLINIC | Facility: CLINIC | Age: 35
End: 2025-08-30

## 2025-08-31 LAB — GROUP B STREP BY PCR FOR PCR OVT: NEGATIVE

## (undated) DIAGNOSIS — Z86.32 HISTORY OF GESTATIONAL DIABETES: Primary | ICD-10-CM

## (undated) DIAGNOSIS — Z86.32 HISTORY OF GESTATIONAL DIABETES IN PRIOR PREGNANCY, CURRENTLY PREGNANT: ICD-10-CM

## (undated) DIAGNOSIS — Z78.9 CONCEIVED BY IN VITRO FERTILIZATION: ICD-10-CM

## (undated) DIAGNOSIS — O09.299 HISTORY OF GESTATIONAL DIABETES IN PRIOR PREGNANCY, CURRENTLY PREGNANT: ICD-10-CM

## (undated) DIAGNOSIS — O24.319 PREGESTATIONAL DIABETES MELLITUS, MODIFIED WHITE CLASS B: Primary | ICD-10-CM

## (undated) DIAGNOSIS — O30.041 DICHORIONIC DIAMNIOTIC TWIN PREGNANCY IN FIRST TRIMESTER: ICD-10-CM

## (undated) NOTE — LETTER
VACCINE ADMINISTRATION RECORD  PARENT / GUARDIAN APPROVAL  Date: 2025  Vaccine administered to: Colette Mantilla     : 1990    MRN: [id number]    A copy of the appropriate Centers for Disease Control and Prevention Vaccine Information statement has been provided. I have read or have had explained the information about the diseases and the vaccines listed below. There was an opportunity to ask questions and any questions were answered satisfactorily. I believe that I understand the benefits and risks of the vaccine cited and ask that the vaccine(s) listed below be given to me or to the person named above (for whom I am authorized to make this request).    VACCINES ADMINISTERED:  Tdap    I have read and hereby agree to be bound by the terms of this agreement as stated above. My signature is valid until revoked by me in writing.  This document is signed by patient relationship: Self on 2025.:                                                                                                                                         Parent / Guardian Signature                                                Date    Carrie Juarez served as a witness to authentication that the identity of the person signing electronically is in fact the person represented as signing.    This document was generated by Carrie Juarez on 2025.

## (undated) NOTE — LETTER
3/15/2022                 Talyor        Leonardo Ruth (: ) is currently under our care for pregnancy. This is a request for a copy of her  section operative report from 2017. Please fax this to our office at [phone].          Thank you,    [address]  [phone]

## (undated) NOTE — LETTER
January 28, 2021    Martina Byrnes MD  [facility]     Patient: Heriberto Hernandez   YOB: 1990   Date of Visit: 1/28/2021       Dear Dr. Donald Graham MD:    Thank you for referring Heriberto Hernandez to me for evaluation.  Here is my • Glucose Blood (ONETOUCH VERIO) In Vitro Strip Check blood sugar once daily 90 strip 1   • Accu-Chek Softclix Lancets Does not apply Misc Check blood sugar daily 90 each 1       Allergies:  No Known Allergies    ROS:     ROS     Positive for: Endocrine, E Diet controlled diabetes: no background of retinopathy, no signs of neovascularization noted. Discussed ocular and systemic benefits of blood sugar control. Diagnosis and treatment discussed in detail with patient.       Hx of LASIK  Patient had LASIK in

## (undated) NOTE — LETTER
The [facility]  Scheduling the Birth of Your Baby    You are scheduled for a  delivery on Fri,25 at 1:30pm with .  You should arrive at the [facility] at 11:30am.      Please follow the enclosed antimicrobial wash instructions.  This wash should be started 2 days prior to surgery and be continued until the day of surgery, for a total of 3 washes.    There is pre-op testing that has to be done within 2 days before your surgery. These labs must be done within the [facility], not an outside lab. All labs must be scheduled in advance throught  [email or web address] or by going to [email or web address].    A Nurse from Labor and Delivery  will be calling a few days before your scheduled section to go over instructions regarding an enhanced recovery. If you don't receive a call please call them at [phone].    Unless otherwise instructed by your physician, DO NOT eat or drink anything within 6 hours of your arrival to the [facility].    If you have not preregistered, please mail in your preregistration forms.    The [facility] is located on the 3rd floor of [facility].  Please use the east elevators.    When you arrive, you will be brought to your room and asked to change into a hospital gown.  Your nurse will take your temperature, blood pressure, and pulse and place you on the fetal monitor to monitor the baby's well being and any uterine activity you may be experiencing prior to your delivery.  Your nurse will also ask you some questions, start an intravenous (IV) line, and possibly draw some blood if your lab tests were not completed prior to your arrival.  You will also sign a consent for surgery.    Your partner will be asked to change into scrubs so that he/she can be with you in the operating room for the birth of your child.  There are no cameras or video cameras allowed in the operating room.    You will be interviewed by the  anesthesiologist, support stockings will be applied to your lower legs, and you will be shaved at the incision site.    When surgery is completed, you and your partner will be taken to the recovery room for at least an hour prior to your return to your room.    Please feel free to contact the [facility] with any questions or concerns @ [phone].

## (undated) NOTE — Clinical Note
Hello,  This is a patient who is seeing me while undergoing IVF trying for pregnancy. Has type 2 diabetes. She is on MDI- sends me her glucose readings about every 3-4 weeks for insulin adjustment. Just wanted to make you guys aware for when she sends readings while I am gone. She is aware that she needs to call with positive pregnancy test to get in sooner, otherwise she will see me when I Return. She does not need to be followed per say- she is good about sending readings on her own and not currently pregnant. Thanks!   Teresita Barahona

## (undated) NOTE — LETTER
2022            To Whom It May Concern:    Please know that Eliza Curiel with : 86 will be undergoing a medical procedure and therefore unable to fly this month of . Her  Tim Summers will need to care for the patient during this time.         Sincerely,    Katherine Meeks, DO  [address] [place] [address], [address] [place] [address]  [phone]

## (undated) NOTE — LETTER
INSTRUCTIONS FOR PRE-SURGICAL   ANTIMICROBIAL BATH/SHOWER    Your doctor has recommended a pre-surgical CHG (chlorhexidine gluconate) shower/bath with Betasept (also sold as Hibiclens).  It reduces bacteria that can potentially cause infection.  Betasept is available at [facility] and usually at your local retail pharmacy.    The average adult will use a total of 6 to 10 ounces for three baths.  That is approximately two 4 oz. Bottles.  Depending on individual size, weight and number of treatments, the amount may vary.    IMPORTANT! Read ALL instructions BEFORE starting.     AVOID these areas:  Head: hair, scalp, eyes, ears, nose and mouth  Genital area (inner vaginal and inner rectal)  All open wounds (including surgical incisions)    CONCENTRATE on these areas:  Under arms  Under breast tissue  Between skin folds  Hair bearing areas of groin and between buttocks    DIRECTIONS:  Take your usual shower or bath, rinse  Pour two ounces of Betasept (or Hibiclens) onto moist washcloth  Avoiding head, wash gently (does not foam)  Let sit on skin for three minutes  Rinse completely with water and towel dry    Repeat bath/shower for three consecutive days:  First bath... Two nights before the day of surgery.  Second bath... The night before surgery.  Third bath... The morning of surgery.  Do not apply lotions, deodorants or powder after the last bath.    DISCARD REMAINING PRODUCT AFTER LAST BATH!    DRUG FACTS    Active Ingredient: Chlorhexidine gluconae solution 4%        Warnings:  For external use only.  Do not use:  If you are allergic to chlorhexidine gluconate or any other ingredients listed on the label  As a pre-surgical cleanser of head or face    STOP USE and notify doctor if :  Irritation or allergic reaction occurs  If contact occurs in eyes, ears, mouth, rinse immediately with cold water.    Keep out of reach of children.  If swallowed get medica help or contact Poison Control Center.   Store between 60-80 degrees F.    Fabric Warning!  CHG WILL STAIN YOUR FABRICS!  Use with care around shower curtains, towels washcloths rugs and clothes.  Wipe surfaces immediately if accidentally splashed.      Laundering Instructions:  CHG skin cleanser will cause stains if used with chlorinated products.  Rinse immediately and completely and use only non-chlorine detergents.